# Patient Record
Sex: FEMALE | Race: BLACK OR AFRICAN AMERICAN | Employment: FULL TIME | URBAN - METROPOLITAN AREA
[De-identification: names, ages, dates, MRNs, and addresses within clinical notes are randomized per-mention and may not be internally consistent; named-entity substitution may affect disease eponyms.]

---

## 2017-01-05 ENCOUNTER — HOSPITAL ENCOUNTER (EMERGENCY)
Age: 32
Discharge: HOME | End: 2017-01-05
Attending: EMERGENCY MEDICINE
Payer: COMMERCIAL

## 2017-01-05 VITALS
HEART RATE: 81 BPM | TEMPERATURE: 98 F | RESPIRATION RATE: 18 BRPM | HEIGHT: 64 IN | SYSTOLIC BLOOD PRESSURE: 143 MMHG | BODY MASS INDEX: 48.14 KG/M2 | WEIGHT: 282 LBS | DIASTOLIC BLOOD PRESSURE: 96 MMHG | OXYGEN SATURATION: 100 %

## 2017-01-05 DIAGNOSIS — K52.9 GASTROENTERITIS: Primary | ICD-10-CM

## 2017-01-05 LAB
ALBUMIN SERPL-MCNC: 3.8 G/DL (ref 3.5–4.8)
ALP LIVER SERPL-CCNC: 51 U/L (ref 37–98)
ALT SERPL-CCNC: 21 U/L (ref 14–54)
AST SERPL-CCNC: 13 U/L (ref 15–41)
BASOPHILS # BLD AUTO: 0.03 X10(3) UL (ref 0–0.1)
BASOPHILS NFR BLD AUTO: 0.3 %
BILIRUB SERPL-MCNC: 0.3 MG/DL (ref 0.1–2)
BUN BLD-MCNC: 13 MG/DL (ref 8–20)
CALCIUM BLD-MCNC: 8.5 MG/DL (ref 8.3–10.3)
CHLORIDE: 111 MMOL/L (ref 101–111)
CO2: 25 MMOL/L (ref 22–32)
CREAT BLD-MCNC: 0.97 MG/DL (ref 0.55–1.02)
EOSINOPHIL # BLD AUTO: 0.08 X10(3) UL (ref 0–0.3)
EOSINOPHIL NFR BLD AUTO: 0.8 %
ERYTHROCYTE [DISTWIDTH] IN BLOOD BY AUTOMATED COUNT: 17.4 % (ref 11.5–16)
GLUCOSE BLD-MCNC: 100 MG/DL (ref 70–99)
HCT VFR BLD AUTO: 35.9 % (ref 34–50)
HGB BLD-MCNC: 10.9 G/DL (ref 12–16)
IMMATURE GRANULOCYTE COUNT: 0.01 X10(3) UL (ref 0–1)
IMMATURE GRANULOCYTE RATIO %: 0.1 %
LYMPHOCYTES # BLD AUTO: 2.87 X10(3) UL (ref 0.9–4)
LYMPHOCYTES NFR BLD AUTO: 27.6 %
M PROTEIN MFR SERPL ELPH: 8 G/DL (ref 6.1–8.3)
MCH RBC QN AUTO: 23.2 PG (ref 27–33.2)
MCHC RBC AUTO-ENTMCNC: 30.4 G/DL (ref 31–37)
MCV RBC AUTO: 76.4 FL (ref 81–100)
MONOCYTES # BLD AUTO: 0.63 X10(3) UL (ref 0.1–0.6)
MONOCYTES NFR BLD AUTO: 6.1 %
NEUTROPHIL ABS PRELIM: 6.76 X10 (3) UL (ref 1.3–6.7)
NEUTROPHILS # BLD AUTO: 6.76 X10(3) UL (ref 1.3–6.7)
NEUTROPHILS NFR BLD AUTO: 65.1 %
PLATELET # BLD AUTO: 317 10(3)UL (ref 150–450)
POTASSIUM SERPL-SCNC: 3.2 MMOL/L (ref 3.6–5.1)
RBC # BLD AUTO: 4.7 X10(6)UL (ref 3.8–5.1)
RED CELL DISTRIBUTION WIDTH-SD: 47.9 FL (ref 35.1–46.3)
SODIUM SERPL-SCNC: 143 MMOL/L (ref 136–144)
WBC # BLD AUTO: 10.4 X10(3) UL (ref 4–13)

## 2017-01-05 PROCEDURE — 80053 COMPREHEN METABOLIC PANEL: CPT | Performed by: EMERGENCY MEDICINE

## 2017-01-05 PROCEDURE — 85025 COMPLETE CBC W/AUTO DIFF WBC: CPT | Performed by: EMERGENCY MEDICINE

## 2017-01-05 PROCEDURE — 96361 HYDRATE IV INFUSION ADD-ON: CPT

## 2017-01-05 PROCEDURE — 99284 EMERGENCY DEPT VISIT MOD MDM: CPT

## 2017-01-05 PROCEDURE — 96374 THER/PROPH/DIAG INJ IV PUSH: CPT

## 2017-01-05 RX ORDER — ONDANSETRON 2 MG/ML
4 INJECTION INTRAMUSCULAR; INTRAVENOUS ONCE
Status: COMPLETED | OUTPATIENT
Start: 2017-01-05 | End: 2017-01-05

## 2017-01-05 RX ORDER — ONDANSETRON 2 MG/ML
4 INJECTION INTRAMUSCULAR; INTRAVENOUS ONCE
Status: DISCONTINUED | OUTPATIENT
Start: 2017-01-05 | End: 2017-01-05

## 2017-01-05 RX ORDER — ONDANSETRON 4 MG/1
4 TABLET, ORALLY DISINTEGRATING ORAL EVERY 4 HOURS PRN
Qty: 10 TABLET | Refills: 0 | Status: SHIPPED | OUTPATIENT
Start: 2017-01-05 | End: 2017-01-12

## 2017-01-05 NOTE — ED PROVIDER NOTES
Patient Seen in: THE [facility] Emergency Department In [place]    History   Patient presents with:  Nausea/Vomiting/Diarrhea (gastrointestinal)    Stated Complaint: vomiting/diarrhea    HPI    29-year-old female that comes in the hospital chief complaint of agreed except as otherwise stated in HPI.     Physical Exam       ED Triage Vitals   BP 01/05/17 1335 170/104 mmHg   Pulse 01/05/17 1335 89   Resp 01/05/17 1335 16   Temp 01/05/17 1335 98.1 °F (36.7 °C)   Temp src --    SpO2 01/05/17 1335 100 %   O2 Device REFLEX   RAINBOW DRAW LAVENDER   RAINBOW DRAW LIGHT GREEN     Medications   ondansetron HCl (ZOFRAN) injection 4 mg (not administered)   sodium chloride 0.9% IV bolus 1,000 mL (1,000 mL Intravenous New Bag 1/5/17 1340)   sodium chloride 0.9% IV bolus 1,000

## 2017-01-23 ENCOUNTER — HOSPITAL ENCOUNTER (EMERGENCY)
Facility: HOSPITAL | Age: 32
Discharge: HOME | End: 2017-01-24
Attending: EMERGENCY MEDICINE
Payer: COMMERCIAL

## 2017-01-23 DIAGNOSIS — R10.9 ABDOMINAL PAIN OF UNKNOWN ETIOLOGY: Primary | ICD-10-CM

## 2017-01-23 DIAGNOSIS — D72.829 LEUKOCYTOSIS, UNSPECIFIED TYPE: ICD-10-CM

## 2017-01-23 LAB
ALBUMIN SERPL-MCNC: 4 G/DL (ref 3.5–4.8)
ALP LIVER SERPL-CCNC: 56 U/L (ref 37–98)
ALT SERPL-CCNC: 23 U/L (ref 14–54)
AST SERPL-CCNC: 14 U/L (ref 15–41)
BASOPHILS # BLD AUTO: 0.05 X10(3) UL (ref 0–0.1)
BASOPHILS NFR BLD AUTO: 0.3 %
BILIRUB SERPL-MCNC: 0.6 MG/DL (ref 0.1–2)
BUN BLD-MCNC: 9 MG/DL (ref 8–20)
CALCIUM BLD-MCNC: 9 MG/DL (ref 8.3–10.3)
CHLORIDE: 104 MMOL/L (ref 101–111)
CO2: 26 MMOL/L (ref 22–32)
CREAT BLD-MCNC: 0.97 MG/DL (ref 0.55–1.02)
EOSINOPHIL # BLD AUTO: 0.04 X10(3) UL (ref 0–0.3)
EOSINOPHIL NFR BLD AUTO: 0.2 %
ERYTHROCYTE [DISTWIDTH] IN BLOOD BY AUTOMATED COUNT: 17 % (ref 11.5–16)
GLUCOSE BLD-MCNC: 84 MG/DL (ref 70–99)
HCT VFR BLD AUTO: 36.1 % (ref 34–50)
HGB BLD-MCNC: 11.1 G/DL (ref 12–16)
IMMATURE GRANULOCYTE COUNT: 0.06 X10(3) UL (ref 0–1)
IMMATURE GRANULOCYTE RATIO %: 0.4 %
LIPASE: 120 U/L (ref 73–393)
LYMPHOCYTES # BLD AUTO: 1.96 X10(3) UL (ref 0.9–4)
LYMPHOCYTES NFR BLD AUTO: 12.2 %
M PROTEIN MFR SERPL ELPH: 8.3 G/DL (ref 6.1–8.3)
MCH RBC QN AUTO: 23.2 PG (ref 27–33.2)
MCHC RBC AUTO-ENTMCNC: 30.7 G/DL (ref 31–37)
MCV RBC AUTO: 75.5 FL (ref 81–100)
MONOCYTES # BLD AUTO: 1.47 X10(3) UL (ref 0.1–0.6)
MONOCYTES NFR BLD AUTO: 9.1 %
NEUTROPHIL ABS PRELIM: 12.54 X10 (3) UL (ref 1.3–6.7)
NEUTROPHILS # BLD AUTO: 12.54 X10(3) UL (ref 1.3–6.7)
NEUTROPHILS NFR BLD AUTO: 77.8 %
PLATELET # BLD AUTO: 287 10(3)UL (ref 150–450)
POCT URINE PREGNANCY: NEGATIVE
POTASSIUM SERPL-SCNC: 3.2 MMOL/L (ref 3.6–5.1)
RBC # BLD AUTO: 4.78 X10(6)UL (ref 3.8–5.1)
RED CELL DISTRIBUTION WIDTH-SD: 45.2 FL (ref 35.1–46.3)
SODIUM SERPL-SCNC: 137 MMOL/L (ref 136–144)
WBC # BLD AUTO: 16.1 X10(3) UL (ref 4–13)

## 2017-01-23 PROCEDURE — 96361 HYDRATE IV INFUSION ADD-ON: CPT

## 2017-01-23 PROCEDURE — 99285 EMERGENCY DEPT VISIT HI MDM: CPT

## 2017-01-23 PROCEDURE — 96374 THER/PROPH/DIAG INJ IV PUSH: CPT

## 2017-01-23 PROCEDURE — 81001 URINALYSIS AUTO W/SCOPE: CPT | Performed by: EMERGENCY MEDICINE

## 2017-01-23 PROCEDURE — 96376 TX/PRO/DX INJ SAME DRUG ADON: CPT

## 2017-01-23 PROCEDURE — 80053 COMPREHEN METABOLIC PANEL: CPT | Performed by: EMERGENCY MEDICINE

## 2017-01-23 PROCEDURE — 85025 COMPLETE CBC W/AUTO DIFF WBC: CPT | Performed by: EMERGENCY MEDICINE

## 2017-01-23 PROCEDURE — 83690 ASSAY OF LIPASE: CPT | Performed by: EMERGENCY MEDICINE

## 2017-01-23 PROCEDURE — 81025 URINE PREGNANCY TEST: CPT

## 2017-01-23 PROCEDURE — 96375 TX/PRO/DX INJ NEW DRUG ADDON: CPT

## 2017-01-23 RX ORDER — LISINOPRIL AND HYDROCHLOROTHIAZIDE 25; 20 MG/1; MG/1
1 TABLET ORAL DAILY
COMMUNITY
End: 2017-12-12

## 2017-01-23 RX ORDER — HYDROMORPHONE HYDROCHLORIDE 1 MG/ML
1 INJECTION, SOLUTION INTRAMUSCULAR; INTRAVENOUS; SUBCUTANEOUS EVERY 30 MIN PRN
Status: DISCONTINUED | OUTPATIENT
Start: 2017-01-23 | End: 2017-01-24

## 2017-01-23 RX ORDER — ONDANSETRON 2 MG/ML
4 INJECTION INTRAMUSCULAR; INTRAVENOUS ONCE
Status: COMPLETED | OUTPATIENT
Start: 2017-01-23 | End: 2017-01-23

## 2017-01-23 RX ORDER — ONDANSETRON 2 MG/ML
4 INJECTION INTRAMUSCULAR; INTRAVENOUS ONCE
Status: COMPLETED | OUTPATIENT
Start: 2017-01-23 | End: 2017-01-24

## 2017-01-24 ENCOUNTER — APPOINTMENT (OUTPATIENT)
Dept: ULTRASOUND IMAGING | Facility: HOSPITAL | Age: 32
End: 2017-01-24
Attending: EMERGENCY MEDICINE
Payer: COMMERCIAL

## 2017-01-24 ENCOUNTER — APPOINTMENT (OUTPATIENT)
Dept: CT IMAGING | Facility: HOSPITAL | Age: 32
End: 2017-01-24
Attending: EMERGENCY MEDICINE
Payer: COMMERCIAL

## 2017-01-24 VITALS
SYSTOLIC BLOOD PRESSURE: 152 MMHG | DIASTOLIC BLOOD PRESSURE: 102 MMHG | WEIGHT: 275 LBS | TEMPERATURE: 99 F | HEIGHT: 64 IN | RESPIRATION RATE: 16 BRPM | OXYGEN SATURATION: 98 % | BODY MASS INDEX: 46.95 KG/M2 | HEART RATE: 92 BPM

## 2017-01-24 PROBLEM — R10.9 ABDOMINAL PAIN OF UNKNOWN ETIOLOGY: Status: ACTIVE | Noted: 2017-01-24

## 2017-01-24 LAB
BILIRUB UR QL STRIP.AUTO: NEGATIVE
CLARITY UR REFRACT.AUTO: CLEAR
COLOR UR AUTO: YELLOW
GLUCOSE UR STRIP.AUTO-MCNC: NEGATIVE MG/DL
KETONES UR STRIP.AUTO-MCNC: 20 MG/DL
LEUKOCYTE ESTERASE UR QL STRIP.AUTO: NEGATIVE
NITRITE UR QL STRIP.AUTO: NEGATIVE
PH UR STRIP.AUTO: 6 [PH] (ref 4.5–8)
PROT UR STRIP.AUTO-MCNC: 100 MG/DL
RBC UR QL AUTO: NEGATIVE
SP GR UR STRIP.AUTO: 1.02 (ref 1–1.03)
UROBILINOGEN UR STRIP.AUTO-MCNC: <2 MG/DL

## 2017-01-24 PROCEDURE — 76700 US EXAM ABDOM COMPLETE: CPT

## 2017-01-24 PROCEDURE — 71275 CT ANGIOGRAPHY CHEST: CPT

## 2017-01-24 PROCEDURE — 74177 CT ABD & PELVIS W/CONTRAST: CPT

## 2017-01-24 RX ORDER — ONDANSETRON 2 MG/ML
4 INJECTION INTRAMUSCULAR; INTRAVENOUS EVERY 4 HOURS PRN
Status: CANCELLED | OUTPATIENT
Start: 2017-01-24

## 2017-01-24 RX ORDER — HYDROMORPHONE HYDROCHLORIDE 1 MG/ML
0.5 INJECTION, SOLUTION INTRAMUSCULAR; INTRAVENOUS; SUBCUTANEOUS EVERY 30 MIN PRN
Status: CANCELLED | OUTPATIENT
Start: 2017-01-24 | End: 2017-01-24

## 2017-01-24 RX ORDER — POTASSIUM CHLORIDE 20 MEQ/1
40 TABLET, EXTENDED RELEASE ORAL ONCE
Status: COMPLETED | OUTPATIENT
Start: 2017-01-24 | End: 2017-01-24

## 2017-01-24 RX ORDER — SODIUM CHLORIDE 9 MG/ML
INJECTION, SOLUTION INTRAVENOUS CONTINUOUS
Status: CANCELLED | OUTPATIENT
Start: 2017-01-24 | End: 2017-01-24

## 2017-01-24 RX ORDER — ONDANSETRON 4 MG/1
4 TABLET, ORALLY DISINTEGRATING ORAL EVERY 4 HOURS PRN
Qty: 10 TABLET | Refills: 0 | Status: SHIPPED | OUTPATIENT
Start: 2017-01-24 | End: 2017-01-31

## 2017-01-24 NOTE — ED PROVIDER NOTES
Patient Seen in: [facility] Emergency Department    History   Patient presents with:  Nausea/Vomiting/Diarrhea (gastrointestinal)  Abdomen/Flank Pain (GI/)    Stated Complaint: vomiting, abd pain    HPI    The patient presents with complaints of r mmHg   Pulse 01/23/17 2301 102   Resp 01/23/17 2301 20   Temp 01/23/17 2301 99.9 °F (37.7 °C)   Temp src 01/23/17 2301 Temporal   SpO2 01/23/17 2301 98 %   O2 Device 01/23/17 2301 None (Room air)       Current:/111 mmHg  Pulse 95  Temp(Src) 99.9 °F ( 12.54 (*)     Monocyte Absolute 1.47 (*)     All other components within normal limits   LIPASE - Normal   CBC WITH DIFFERENTIAL WITH PLATELET    Narrative: The following orders were created for panel order CBC WITH DIFFERENTIAL WITH PLATELET.   Procedu Hospitalists. She may need a consult to surgery in the morning. At this present time there is no some rebound, guarding.     Disposition and Plan     Clinical Impression:  Abdominal pain of unknown etiology  (primary encounter diagnosis)  Leukocytosis, un

## 2017-02-24 ENCOUNTER — HOSPITAL ENCOUNTER (EMERGENCY)
Age: 32
Discharge: HOME | End: 2017-02-24
Attending: EMERGENCY MEDICINE
Payer: COMMERCIAL

## 2017-02-24 VITALS
RESPIRATION RATE: 16 BRPM | OXYGEN SATURATION: 98 % | TEMPERATURE: 99 F | WEIGHT: 275 LBS | DIASTOLIC BLOOD PRESSURE: 102 MMHG | HEIGHT: 64 IN | BODY MASS INDEX: 46.95 KG/M2 | HEART RATE: 86 BPM | SYSTOLIC BLOOD PRESSURE: 162 MMHG

## 2017-02-24 DIAGNOSIS — B37.3 CANDIDA VAGINITIS: Primary | ICD-10-CM

## 2017-02-24 LAB
BILIRUB UR QL STRIP.AUTO: NEGATIVE
COLOR UR AUTO: YELLOW
GLUCOSE UR STRIP.AUTO-MCNC: NEGATIVE MG/DL
KETONES UR STRIP.AUTO-MCNC: NEGATIVE MG/DL
NITRITE UR QL STRIP.AUTO: NEGATIVE
PH UR STRIP.AUTO: 7.5 [PH] (ref 4.5–8)
POCT LOT NUMBER: NORMAL
POCT URINE PREGNANCY: NEGATIVE
RBC UR QL AUTO: NEGATIVE
SP GR UR STRIP.AUTO: 1.02 (ref 1–1.03)
UROBILINOGEN UR STRIP.AUTO-MCNC: 1 MG/DL

## 2017-02-24 PROCEDURE — 87660 TRICHOMONAS VAGIN DIR PROBE: CPT | Performed by: EMERGENCY MEDICINE

## 2017-02-24 PROCEDURE — 81025 URINE PREGNANCY TEST: CPT

## 2017-02-24 PROCEDURE — 87491 CHLMYD TRACH DNA AMP PROBE: CPT | Performed by: EMERGENCY MEDICINE

## 2017-02-24 PROCEDURE — 99284 EMERGENCY DEPT VISIT MOD MDM: CPT

## 2017-02-24 PROCEDURE — 87510 GARDNER VAG DNA DIR PROBE: CPT | Performed by: EMERGENCY MEDICINE

## 2017-02-24 PROCEDURE — 87591 N.GONORRHOEAE DNA AMP PROB: CPT | Performed by: EMERGENCY MEDICINE

## 2017-02-24 PROCEDURE — 87480 CANDIDA DNA DIR PROBE: CPT | Performed by: EMERGENCY MEDICINE

## 2017-02-24 PROCEDURE — 87086 URINE CULTURE/COLONY COUNT: CPT | Performed by: EMERGENCY MEDICINE

## 2017-02-24 PROCEDURE — 81001 URINALYSIS AUTO W/SCOPE: CPT | Performed by: EMERGENCY MEDICINE

## 2017-02-24 NOTE — ED PROVIDER NOTES
Patient Seen in: THE [facility] Emergency Department In [place]    History   Patient presents with:  Eval-G (gynecologic)    Stated Complaint: vaginal itching/discharge    HPI    27-year-old female who presented to the emergency room complaining of vaginal di °F (37.1 °C)   Temp src 02/24/17 1232 Temporal   SpO2 02/24/17 1232 98 %   O2 Device 02/24/17 1232 None (Room air)       Current:/102 mmHg  Pulse 86  Temp(Src) 98.8 °F (37.1 °C) (Temporal)  Resp 16  Ht 162.6 cm (5' 4\")  Wt 124.739 kg  BMI 47.18 kg/m diagnosis)    Disposition:  Discharge    Follow-up:  Elissa Pastrana  [address] [facility]  [phone]    Schedule an appointment as soon as possible for a visit in 2 days        Medications Prescribed:  Current Discharge Medica

## 2017-02-25 NOTE — ED NOTES
PT NOTIFIED OF POSITIVE YEAST INFECTION AND TO COMPLETE COURSE OF MONISTAT AND FOLLOW UP WITH HER PRIMARY MD OR OBGYN. NO QUESTIONS ASKED.  VOICED UNDERSTANDING OF INSTRUCTIONS.   NO FURTHER TREATMENT REQUIRED

## 2017-02-27 LAB
C TRACH DNA SPEC QL NAA+PROBE: NEGATIVE
N GONORRHOEA DNA SPEC QL NAA+PROBE: NEGATIVE

## 2017-02-28 NOTE — ED NOTES
Pt called stating that she still has a yeast infection. Wanting another medication. I talked with Dr. Camilla Higginbotham and called in a prescription for Diflucan 150mg tablet for patient. Pt verbalized understanding.   Called Natalie in [place].   [phone]

## 2017-04-24 ENCOUNTER — APPOINTMENT (OUTPATIENT)
Dept: GENERAL RADIOLOGY | Age: 32
End: 2017-04-24
Payer: COMMERCIAL

## 2017-04-24 ENCOUNTER — HOSPITAL ENCOUNTER (EMERGENCY)
Age: 32
Discharge: HOME | End: 2017-04-24
Payer: COMMERCIAL

## 2017-04-24 VITALS
SYSTOLIC BLOOD PRESSURE: 165 MMHG | DIASTOLIC BLOOD PRESSURE: 102 MMHG | HEIGHT: 64 IN | OXYGEN SATURATION: 98 % | HEART RATE: 77 BPM | BODY MASS INDEX: 46.1 KG/M2 | RESPIRATION RATE: 16 BRPM | WEIGHT: 270 LBS | TEMPERATURE: 99 F

## 2017-04-24 DIAGNOSIS — S63.502A WRIST SPRAIN, LEFT, INITIAL ENCOUNTER: Primary | ICD-10-CM

## 2017-04-24 PROCEDURE — 99283 EMERGENCY DEPT VISIT LOW MDM: CPT

## 2017-04-24 PROCEDURE — 73110 X-RAY EXAM OF WRIST: CPT

## 2017-04-24 NOTE — ED PROVIDER NOTES
Patient Seen in: THE [facility] Emergency Department In [place]    History   Patient presents with:  Upper Extremity Injury (musculoskeletal)    Stated Complaint: LEFT WRIST PAIN \"POP\" AFTER PICKING UP LAUNDRY BASKET    HPI    Patient was picking up a laund Triage Vitals   BP 04/24/17 1323 139/92 mmHg   Pulse 04/24/17 1323 83   Resp 04/24/17 1323 18   Temp 04/24/17 1323 98.7 °F (37.1 °C)   Temp src 04/24/17 1323 Temporal   SpO2 04/24/17 1323 98 %   O2 Device 04/24/17 1323 None (Room air)       Current:/

## 2017-06-03 ENCOUNTER — APPOINTMENT (OUTPATIENT)
Dept: GENERAL RADIOLOGY | Age: 32
End: 2017-06-03
Attending: PHYSICIAN ASSISTANT
Payer: COMMERCIAL

## 2017-06-03 ENCOUNTER — HOSPITAL ENCOUNTER (EMERGENCY)
Age: 32
Discharge: HOME | End: 2017-06-03
Attending: EMERGENCY MEDICINE
Payer: COMMERCIAL

## 2017-06-03 VITALS
BODY MASS INDEX: 46.44 KG/M2 | HEIGHT: 64 IN | DIASTOLIC BLOOD PRESSURE: 89 MMHG | RESPIRATION RATE: 20 BRPM | HEART RATE: 71 BPM | SYSTOLIC BLOOD PRESSURE: 144 MMHG | TEMPERATURE: 99 F | WEIGHT: 272 LBS | OXYGEN SATURATION: 100 %

## 2017-06-03 DIAGNOSIS — R10.30 LOWER ABDOMINAL PAIN: Primary | ICD-10-CM

## 2017-06-03 PROCEDURE — 96361 HYDRATE IV INFUSION ADD-ON: CPT

## 2017-06-03 PROCEDURE — 74000 XR ABDOMEN (KUB) (1 AP VIEW)  (CPT=74000): CPT | Performed by: PHYSICIAN ASSISTANT

## 2017-06-03 PROCEDURE — 99284 EMERGENCY DEPT VISIT MOD MDM: CPT

## 2017-06-03 PROCEDURE — 81025 URINE PREGNANCY TEST: CPT

## 2017-06-03 PROCEDURE — 85025 COMPLETE CBC W/AUTO DIFF WBC: CPT | Performed by: PHYSICIAN ASSISTANT

## 2017-06-03 PROCEDURE — 81001 URINALYSIS AUTO W/SCOPE: CPT | Performed by: EMERGENCY MEDICINE

## 2017-06-03 PROCEDURE — 83690 ASSAY OF LIPASE: CPT | Performed by: PHYSICIAN ASSISTANT

## 2017-06-03 PROCEDURE — 96374 THER/PROPH/DIAG INJ IV PUSH: CPT

## 2017-06-03 PROCEDURE — 80053 COMPREHEN METABOLIC PANEL: CPT | Performed by: PHYSICIAN ASSISTANT

## 2017-06-03 RX ORDER — DICYCLOMINE HCL 20 MG
20 TABLET ORAL 4 TIMES DAILY PRN
Qty: 30 TABLET | Refills: 0 | Status: SHIPPED | OUTPATIENT
Start: 2017-06-03 | End: 2017-07-03

## 2017-06-03 RX ORDER — ONDANSETRON 2 MG/ML
4 INJECTION INTRAMUSCULAR; INTRAVENOUS ONCE
Status: COMPLETED | OUTPATIENT
Start: 2017-06-03 | End: 2017-06-03

## 2017-06-03 NOTE — ED PROVIDER NOTES
Patient Seen in: THE [facility] Emergency Department In [place]    History   Patient presents with:  Nausea/Vomiting/Diarrhea (gastrointestinal)    Stated Complaint:     HPI    Patient is a 65-year-old female.   For the past 3 days, patient has been experienci All other systems reviewed and negative except as noted above. PSFH elements reviewed from today and agreed except as otherwise stated in HPI.     Physical Exam       ED Triage Vitals   BP 06/03/17 1325 196/112 mmHg   Pulse 06/03/17 1324 83   Resp 06 Abnormal; Notable for the following:     RBC URINE >10 (*)     Bacteria Urine Rare (*)     Mucous Urine 1+ (*)     All other components within normal limits   COMP METABOLIC PANEL (14) - Abnormal; Notable for the following:     BUN 7 (*)     AST 10 (*) CT scans of the abdomen and pelvis. Patient's menstrual cycle started 3 days prior to arrival.  She also complains of urinary frequency, dysuria and urgency and loose stools. She is nontoxic-appearing at this time. No fever chills.   No back pain or flan

## 2017-06-03 NOTE — ED INITIAL ASSESSMENT (HPI)
Pt c/o right flank pain x \"a couple days ago, and wraps around to the front of me\". + nausea, vomiting. + diarrhea \"today\". Denies fever, dysuria, or hematuria. Motrin last at 0900.

## 2017-06-03 NOTE — ED PROVIDER NOTES
29-year-old female presents to the emergency department with complaints of intermittent lower abdominal cramping. Has been ongoing for the last few days.   Initially she was sitting was more in the right lower quadrant but on further discussion was going a

## 2017-08-04 ENCOUNTER — HOSPITAL ENCOUNTER (EMERGENCY)
Age: 32
Discharge: HOME | End: 2017-08-04
Attending: EMERGENCY MEDICINE
Payer: COMMERCIAL

## 2017-08-04 VITALS
HEART RATE: 77 BPM | DIASTOLIC BLOOD PRESSURE: 94 MMHG | BODY MASS INDEX: 46.95 KG/M2 | WEIGHT: 275 LBS | HEIGHT: 64 IN | OXYGEN SATURATION: 98 % | RESPIRATION RATE: 18 BRPM | TEMPERATURE: 99 F | SYSTOLIC BLOOD PRESSURE: 159 MMHG

## 2017-08-04 DIAGNOSIS — M54.9 BACK PAIN WITHOUT RADIATION: ICD-10-CM

## 2017-08-04 DIAGNOSIS — R11.2 NAUSEA AND VOMITING IN ADULT: Primary | ICD-10-CM

## 2017-08-04 LAB
BILIRUB UR QL STRIP.AUTO: NEGATIVE
COLOR UR AUTO: YELLOW
GLUCOSE UR STRIP.AUTO-MCNC: NEGATIVE MG/DL
KETONES UR STRIP.AUTO-MCNC: NEGATIVE MG/DL
LEUKOCYTE ESTERASE UR QL STRIP.AUTO: NEGATIVE
NITRITE UR QL STRIP.AUTO: NEGATIVE
PH UR STRIP.AUTO: 7 [PH] (ref 4.5–8)
POCT LOT NUMBER: NORMAL
POCT URINE PREGNANCY: NEGATIVE
RBC #/AREA URNS AUTO: >10 /HPF
SP GR UR STRIP.AUTO: 1.02 (ref 1–1.03)
UROBILINOGEN UR STRIP.AUTO-MCNC: 0.2 MG/DL

## 2017-08-04 PROCEDURE — 99283 EMERGENCY DEPT VISIT LOW MDM: CPT

## 2017-08-04 PROCEDURE — 81025 URINE PREGNANCY TEST: CPT

## 2017-08-04 PROCEDURE — 81001 URINALYSIS AUTO W/SCOPE: CPT | Performed by: PHYSICIAN ASSISTANT

## 2017-08-04 RX ORDER — ONDANSETRON 4 MG/1
4 TABLET, ORALLY DISINTEGRATING ORAL EVERY 4 HOURS PRN
Qty: 10 TABLET | Refills: 0 | Status: SHIPPED | OUTPATIENT
Start: 2017-08-04 | End: 2017-08-11

## 2017-08-04 RX ORDER — ONDANSETRON 4 MG/1
4 TABLET, ORALLY DISINTEGRATING ORAL ONCE
Status: COMPLETED | OUTPATIENT
Start: 2017-08-04 | End: 2017-08-04

## 2017-08-04 RX ORDER — MAGNESIUM HYDROXIDE/ALUMINUM HYDROXICE/SIMETHICONE 120; 1200; 1200 MG/30ML; MG/30ML; MG/30ML
30 SUSPENSION ORAL ONCE
Status: COMPLETED | OUTPATIENT
Start: 2017-08-04 | End: 2017-08-04

## 2017-08-04 NOTE — ED PROVIDER NOTES
Patient Seen in: THE [facility] Emergency Department In [facility] [place]    History   Patient presents with:  Back Pain (musculoskeletal)  Nausea/Vomiting/Diarrhea (gastrointestinal)    Stated Complaint: back pain/vomiting    HPI    CHIEF COMPLAINT: Back pain and vomi Take 1 tablet (4 mg total) by mouth every 4 (four) hours as needed for Nausea. Sertraline HCl (ZOLOFT OR),  Take by mouth. Lisinopril-Hydrochlorothiazide 20-25 MG Oral Tab,  Take 1 tablet by mouth daily.    Liraglutide -Weight Management (SAXENDA SC), auscultation  Cardiovascular: regular rate and rhythm  Gastrointestinal:  abdomen is soft,  and non tender, no masses, bowel sounds normal.  No rebound, guarding or rigidity noted. No abdominal distention. No pulsatile masses.   No CVA tenderness bilateral [address] [address]  [phone]    In 3 days  for a recheck    Cruz Francis MD  [address] [place] [address]  [phone]      gynecology referral    Nusrat Carias MD  #1 [id number] [facility] [address]

## 2017-09-12 ENCOUNTER — HOSPITAL ENCOUNTER (EMERGENCY)
Age: 32
Discharge: HOME | End: 2017-09-12
Attending: EMERGENCY MEDICINE
Payer: COMMERCIAL

## 2017-09-12 VITALS
WEIGHT: 275 LBS | TEMPERATURE: 100 F | BODY MASS INDEX: 47 KG/M2 | RESPIRATION RATE: 18 BRPM | HEART RATE: 91 BPM | SYSTOLIC BLOOD PRESSURE: 157 MMHG | DIASTOLIC BLOOD PRESSURE: 96 MMHG | OXYGEN SATURATION: 98 %

## 2017-09-12 DIAGNOSIS — S05.02XA ABRASION OF LEFT CORNEA, INITIAL ENCOUNTER: Primary | ICD-10-CM

## 2017-09-12 PROCEDURE — 99283 EMERGENCY DEPT VISIT LOW MDM: CPT

## 2017-09-12 RX ORDER — TOBRAMYCIN 3 MG/ML
2 SOLUTION/ DROPS OPHTHALMIC
Qty: 1 BOTTLE | Refills: 0 | Status: SHIPPED | OUTPATIENT
Start: 2017-09-12 | End: 2017-09-17

## 2017-09-12 RX ORDER — TETRACAINE HYDROCHLORIDE 5 MG/ML
1 SOLUTION OPHTHALMIC ONCE
Status: COMPLETED | OUTPATIENT
Start: 2017-09-12 | End: 2017-09-12

## 2017-09-12 RX ORDER — HYDROCODONE BITARTRATE AND ACETAMINOPHEN 5; 325 MG/1; MG/1
1-2 TABLET ORAL EVERY 4 HOURS PRN
Qty: 20 TABLET | Refills: 0 | Status: SHIPPED | OUTPATIENT
Start: 2017-09-12 | End: 2017-09-19

## 2017-09-12 NOTE — ED PROVIDER NOTES
Patient Seen in: [address] Emergency Department In [place]    History   Patient presents with: Eye Visual Problem (opthalmic)    Stated Complaint:     HPI    This is a pleasant 73-year-old female coming with complaints of eye pain.   He started noticing oriented patient appears no distress HEENT exam is normal focused eye exam shows no proptosis no periorbital erythema there is no foreign bodies underneath the lids with which were inverted for the exam.  Patient is noted to have normal funduscopic exam co

## 2017-10-07 ENCOUNTER — HOSPITAL ENCOUNTER (EMERGENCY)
Age: 32
Discharge: HOME | End: 2017-10-07
Attending: EMERGENCY MEDICINE
Payer: COMMERCIAL

## 2017-10-07 ENCOUNTER — APPOINTMENT (OUTPATIENT)
Dept: ULTRASOUND IMAGING | Age: 32
End: 2017-10-07
Attending: EMERGENCY MEDICINE
Payer: COMMERCIAL

## 2017-10-07 VITALS
WEIGHT: 280 LBS | HEART RATE: 82 BPM | SYSTOLIC BLOOD PRESSURE: 165 MMHG | TEMPERATURE: 99 F | OXYGEN SATURATION: 100 % | RESPIRATION RATE: 20 BRPM | HEIGHT: 64 IN | DIASTOLIC BLOOD PRESSURE: 109 MMHG | BODY MASS INDEX: 47.8 KG/M2

## 2017-10-07 DIAGNOSIS — R10.9 ABDOMINAL PAIN AFFECTING PREGNANCY: Primary | ICD-10-CM

## 2017-10-07 DIAGNOSIS — O26.899 ABDOMINAL PAIN AFFECTING PREGNANCY: Primary | ICD-10-CM

## 2017-10-07 PROCEDURE — 99284 EMERGENCY DEPT VISIT MOD MDM: CPT

## 2017-10-07 PROCEDURE — 86901 BLOOD TYPING SEROLOGIC RH(D): CPT | Performed by: EMERGENCY MEDICINE

## 2017-10-07 PROCEDURE — 85025 COMPLETE CBC W/AUTO DIFF WBC: CPT | Performed by: EMERGENCY MEDICINE

## 2017-10-07 PROCEDURE — 36415 COLL VENOUS BLD VENIPUNCTURE: CPT

## 2017-10-07 PROCEDURE — 76801 OB US < 14 WKS SINGLE FETUS: CPT | Performed by: EMERGENCY MEDICINE

## 2017-10-07 PROCEDURE — 84702 CHORIONIC GONADOTROPIN TEST: CPT | Performed by: EMERGENCY MEDICINE

## 2017-10-07 PROCEDURE — 76817 TRANSVAGINAL US OBSTETRIC: CPT | Performed by: EMERGENCY MEDICINE

## 2017-10-07 PROCEDURE — 81025 URINE PREGNANCY TEST: CPT

## 2017-10-07 PROCEDURE — 99285 EMERGENCY DEPT VISIT HI MDM: CPT

## 2017-10-07 PROCEDURE — 81001 URINALYSIS AUTO W/SCOPE: CPT | Performed by: EMERGENCY MEDICINE

## 2017-10-07 PROCEDURE — 86900 BLOOD TYPING SEROLOGIC ABO: CPT | Performed by: EMERGENCY MEDICINE

## 2017-10-08 NOTE — ED PROVIDER NOTES
Patient Seen in: THE [facility] Emergency Department In [place]    History   Patient presents with:  Urinary Symptoms (urologic)    Stated Complaint: urinary frequency x 3 days     HPI    57-year-old -American female who presents emergency room today f otherwise stated in HPI.     Physical Exam   ED Triage Vitals [10/07/17 1707]  BP: (!) 161/101  Pulse: 89  Resp: 20  Temp: 98.8 °F (37.1 °C)  Temp src: Oral  SpO2: 100 %  O2 Device: None (Room air)    Current:BP (!) 165/109   Pulse 82   Temp 98.8 °F (37.1 ° 49.2 (*)     Lymphocyte Absolute 4.83 (*)     Monocyte Absolute 1.01 (*)     All other components within normal limits   CBC WITH DIFFERENTIAL WITH PLATELET    Narrative:      The following orders were created for panel order CBC WITH DIFFERENTIAL WITH PLAT diagnosis)    Disposition:  Discharge    Follow-up:  Juan F Olmedo  [address] [facility] [address]  [address], [address] [facility], [address] [place] [address] [place] [address]  [phone]    In 3 days        Medication

## 2017-10-25 ENCOUNTER — HOSPITAL ENCOUNTER (EMERGENCY)
Age: 32
Discharge: HOME | End: 2017-10-25
Attending: EMERGENCY MEDICINE
Payer: COMMERCIAL

## 2017-10-25 VITALS
OXYGEN SATURATION: 100 % | RESPIRATION RATE: 20 BRPM | BODY MASS INDEX: 46.1 KG/M2 | HEIGHT: 64 IN | SYSTOLIC BLOOD PRESSURE: 161 MMHG | HEART RATE: 80 BPM | WEIGHT: 270 LBS | DIASTOLIC BLOOD PRESSURE: 101 MMHG | TEMPERATURE: 98 F

## 2017-10-25 DIAGNOSIS — I10 ELEVATED BLOOD PRESSURE READING IN OFFICE WITH DIAGNOSIS OF HYPERTENSION: ICD-10-CM

## 2017-10-25 DIAGNOSIS — H57.12 EYE DISCOMFORT, LEFT: Primary | ICD-10-CM

## 2017-10-25 PROCEDURE — 99283 EMERGENCY DEPT VISIT LOW MDM: CPT

## 2017-10-25 RX ORDER — SULFACETAMIDE SODIUM 100 MG/ML
1 SOLUTION/ DROPS OPHTHALMIC 4 TIMES DAILY
Qty: 1 BOTTLE | Refills: 0 | Status: SHIPPED | OUTPATIENT
Start: 2017-10-25 | End: 2017-11-11 | Stop reason: ALTCHOICE

## 2017-10-25 RX ORDER — TETRACAINE HYDROCHLORIDE 5 MG/ML
1 SOLUTION OPHTHALMIC ONCE
Status: COMPLETED | OUTPATIENT
Start: 2017-10-25 | End: 2017-10-25

## 2017-10-25 NOTE — ED PROVIDER NOTES
Patient Seen in: [facility] In [place]    History   Patient presents with:   Eye Visual Problem (opthalmic)    Stated Complaint: left eye pain     HPI  37-year-old female who comes in today stating that her left eye was itchy last nigh Pulse 80   Temp 98.3 °F (36.8 °C) (Temporal)   Resp 20   Ht 162.6 cm (5' 4\")   Wt 122.5 kg   LMP 08/24/2017   SpO2 100%   BMI 46.35 kg/m²     Right Eye Chart Acuity: 20/15, Corrected  Left Eye Chart Acuity: 20/15, Corrected    Physical Exam    BP (!) 161/ her visit today and remains so upon discharge.  I discuss the plan of care with the patient, who expresses understanding.  All questions and concerns are addressed to the patient's satisfaction prior to discharge today.         Disposition and Plan     Clin

## 2017-10-25 NOTE — ED INITIAL ASSESSMENT (HPI)
REPORTS WAKING UP EARLY AM WITH LEFT EYE IRRITATION.   STATES THRU THE DAY IT HAS GOTTEN MORE PAINFUL

## 2017-11-11 ENCOUNTER — HOSPITAL ENCOUNTER (EMERGENCY)
Age: 32
Discharge: HOME | End: 2017-11-11
Attending: EMERGENCY MEDICINE
Payer: COMMERCIAL

## 2017-11-11 ENCOUNTER — APPOINTMENT (OUTPATIENT)
Dept: ULTRASOUND IMAGING | Age: 32
End: 2017-11-11
Attending: EMERGENCY MEDICINE
Payer: COMMERCIAL

## 2017-11-11 VITALS
HEART RATE: 93 BPM | WEIGHT: 274 LBS | TEMPERATURE: 98 F | BODY MASS INDEX: 46.78 KG/M2 | OXYGEN SATURATION: 99 % | DIASTOLIC BLOOD PRESSURE: 77 MMHG | HEIGHT: 64 IN | RESPIRATION RATE: 16 BRPM | SYSTOLIC BLOOD PRESSURE: 150 MMHG

## 2017-11-11 DIAGNOSIS — N30.90 BLADDER INFECTION: ICD-10-CM

## 2017-11-11 DIAGNOSIS — I10 UNCONTROLLED HYPERTENSION: Primary | ICD-10-CM

## 2017-11-11 DIAGNOSIS — O20.0 THREATENED MISCARRIAGE: ICD-10-CM

## 2017-11-11 PROCEDURE — 87480 CANDIDA DNA DIR PROBE: CPT | Performed by: EMERGENCY MEDICINE

## 2017-11-11 PROCEDURE — 85025 COMPLETE CBC W/AUTO DIFF WBC: CPT | Performed by: EMERGENCY MEDICINE

## 2017-11-11 PROCEDURE — 87491 CHLMYD TRACH DNA AMP PROBE: CPT | Performed by: EMERGENCY MEDICINE

## 2017-11-11 PROCEDURE — 87077 CULTURE AEROBIC IDENTIFY: CPT | Performed by: EMERGENCY MEDICINE

## 2017-11-11 PROCEDURE — 87086 URINE CULTURE/COLONY COUNT: CPT | Performed by: EMERGENCY MEDICINE

## 2017-11-11 PROCEDURE — 87591 N.GONORRHOEAE DNA AMP PROB: CPT | Performed by: EMERGENCY MEDICINE

## 2017-11-11 PROCEDURE — 80053 COMPREHEN METABOLIC PANEL: CPT | Performed by: EMERGENCY MEDICINE

## 2017-11-11 PROCEDURE — 87510 GARDNER VAG DNA DIR PROBE: CPT | Performed by: EMERGENCY MEDICINE

## 2017-11-11 PROCEDURE — 84702 CHORIONIC GONADOTROPIN TEST: CPT | Performed by: EMERGENCY MEDICINE

## 2017-11-11 PROCEDURE — 76801 OB US < 14 WKS SINGLE FETUS: CPT | Performed by: EMERGENCY MEDICINE

## 2017-11-11 PROCEDURE — 36415 COLL VENOUS BLD VENIPUNCTURE: CPT

## 2017-11-11 PROCEDURE — 87660 TRICHOMONAS VAGIN DIR PROBE: CPT | Performed by: EMERGENCY MEDICINE

## 2017-11-11 PROCEDURE — 86901 BLOOD TYPING SEROLOGIC RH(D): CPT | Performed by: EMERGENCY MEDICINE

## 2017-11-11 PROCEDURE — 99284 EMERGENCY DEPT VISIT MOD MDM: CPT

## 2017-11-11 PROCEDURE — 86900 BLOOD TYPING SEROLOGIC ABO: CPT | Performed by: EMERGENCY MEDICINE

## 2017-11-11 PROCEDURE — 99285 EMERGENCY DEPT VISIT HI MDM: CPT

## 2017-11-11 PROCEDURE — 81001 URINALYSIS AUTO W/SCOPE: CPT | Performed by: EMERGENCY MEDICINE

## 2017-11-11 RX ORDER — CEPHALEXIN 250 MG/1
250 CAPSULE ORAL 4 TIMES DAILY
Qty: 28 CAPSULE | Refills: 0 | Status: SHIPPED | OUTPATIENT
Start: 2017-11-11 | End: 2017-11-18

## 2017-11-11 RX ORDER — POTASSIUM CHLORIDE 20 MEQ/1
40 TABLET, EXTENDED RELEASE ORAL ONCE
Status: COMPLETED | OUTPATIENT
Start: 2017-11-11 | End: 2017-11-11

## 2017-11-11 NOTE — ED PROVIDER NOTES
Patient Seen in: [address] Emergency Department In [place]    History   Patient presents with:  Eval-G (gynecologic)    Stated Complaint: eval g    HPI    Patient presents with spotting and cramping in pregnancy.   The patient is a  at 10 weeks pregn 97.6 °F (36.4 °C) (Temporal)   Resp 16   Ht 162.6 cm (5' 4\")   Wt 124.3 kg   LMP 08/24/2017   SpO2 99%   BMI 47.03 kg/m²         Physical Exam    General: Alert and oriented x3 in no acute distress.   HEENT: Normocephalic, atraumatic, pupils equal round an PLATELET    Narrative: The following orders were created for panel order CBC WITH DIFFERENTIAL WITH PLATELET.   Procedure                               Abnormality         Status                     ---------                               ----------- as of Nov 11 1911  ------------------------------------------------------------  The patient was given oral potassium supplementation for her mildly low potassium. She had no further bleeding while in the emergency department.   Her blood pressure has been

## 2017-12-12 PROBLEM — O10.019 BENIGN ESSENTIAL HYPERTENSION, ANTEPARTUM (HCC): Status: ACTIVE | Noted: 2017-12-12

## 2017-12-12 PROBLEM — Z98.891 S/P CESAREAN SECTION: Status: ACTIVE | Noted: 2017-12-12

## 2017-12-12 PROBLEM — O09.629 SUPERVISION OF HIGH-RISK PREGNANCY OF YOUNG MULTIGRAVIDA (HCC): Status: ACTIVE | Noted: 2017-12-12

## 2017-12-12 PROBLEM — O09.629 SUPERVISION OF HIGH-RISK PREGNANCY OF YOUNG MULTIGRAVIDA: Status: ACTIVE | Noted: 2017-12-12

## 2017-12-12 PROBLEM — F41.9 ANXIETY AND DEPRESSION: Status: ACTIVE | Noted: 2017-12-12

## 2017-12-12 PROBLEM — E66.01 MORBID OBESITY (HCC): Status: ACTIVE | Noted: 2017-12-12

## 2017-12-12 PROBLEM — F32.A ANXIETY AND DEPRESSION: Status: ACTIVE | Noted: 2017-12-12

## 2017-12-12 PROBLEM — O10.019 BENIGN ESSENTIAL HYPERTENSION, ANTEPARTUM: Status: ACTIVE | Noted: 2017-12-12

## 2017-12-12 PROCEDURE — 84156 ASSAY OF PROTEIN URINE: CPT | Performed by: OBSTETRICS & GYNECOLOGY

## 2017-12-12 PROCEDURE — 82570 ASSAY OF URINE CREATININE: CPT | Performed by: OBSTETRICS & GYNECOLOGY

## 2017-12-12 PROCEDURE — 36415 COLL VENOUS BLD VENIPUNCTURE: CPT | Performed by: OBSTETRICS & GYNECOLOGY

## 2018-01-16 ENCOUNTER — HOSPITAL (OUTPATIENT)
Dept: OTHER | Age: 33
End: 2018-01-16
Attending: OBSTETRICS & GYNECOLOGY

## 2018-01-16 LAB
ALT SERPL-CCNC: 12 UNIT/L
ANALYZER ANC (IANC): ABNORMAL
APPEARANCE UR: CLEAR
AST SERPL-CCNC: 8 UNIT/L
BILIRUB UR QL STRIP: NEGATIVE
BUN SERPL-MCNC: 7 MG/DL (ref 6–20)
BUN/CREAT SERPL: 10 (ref 7–25)
COLOR UR: YELLOW
CREAT SERPL-MCNC: 0.69 MG/DL (ref 0.51–0.95)
ERYTHROCYTE [DISTWIDTH] IN BLOOD: 18.5 % (ref 11–15)
FIBRINOGEN RESULT: 507 MG/DL (ref 190–425)
GLUCOSE UR STRIP-MCNC: NEGATIVE MG/DL
HEMATOCRIT: 31.6 % (ref 36–46.5)
HEMOCCULT STL QL: NEGATIVE
HGB BLD-MCNC: 9.9 GM/DL (ref 12–15.5)
KETONES UR STRIP-MCNC: ABNORMAL MG/DL
LEUKOCYTE ESTERASE UR QL STRIP: NEGATIVE
MCH RBC QN AUTO: 22.9 PG (ref 26–34)
MCHC RBC AUTO-ENTMCNC: 31.3 GM/DL (ref 32–36.5)
MCV RBC AUTO: 73 FL (ref 78–100)
NITRITE UR QL STRIP: NEGATIVE
ORGANIC ACIDS/CREAT UR-SRTO: 258.75 MG/DL
PH UR STRIP: 7 UNIT (ref 5–7)
PLATELET # BLD: 284 THOUSAND/MCL (ref 140–450)
PROT UR STRIP-MCNC: 30 MG/DL
PROT UR-MCNC: 37 MG/DL
PROT/CREAT UR: 143 MG/GM CREAT
RBC # BLD: 4.33 MILLION/MCL (ref 4–5.2)
SP GR UR STRIP: 1.02 (ref 1–1.03)
URATE SERPL-MCNC: 2.9 MG/DL (ref 2.6–5.9)
UROBILINOGEN UR STRIP-MCNC: 0.2 MG/DL (ref 0–1)
WBC # BLD: 16.7 THOUSAND/MCL (ref 4.2–11)

## 2018-01-22 ENCOUNTER — HOSPITAL (OUTPATIENT)
Dept: OTHER | Age: 33
End: 2018-01-22
Attending: OBSTETRICS & GYNECOLOGY

## 2018-01-22 LAB
ALBUMIN SERPL-MCNC: 2.8 GM/DL (ref 3.6–5.1)
ALBUMIN/GLOB SERPL: 0.6 {RATIO} (ref 1–2.4)
ALP SERPL-CCNC: 39 UNIT/L (ref 45–117)
ALT SERPL-CCNC: 14 UNIT/L
ANALYZER ANC (IANC): ABNORMAL
ANION GAP SERPL CALC-SCNC: 14 MMOL/L (ref 10–20)
AST SERPL-CCNC: 13 UNIT/L
BASOPHILS # BLD: 0 THOUSAND/MCL (ref 0–0.3)
BASOPHILS NFR BLD: 0 %
BILIRUB SERPL-MCNC: 0.2 MG/DL (ref 0.2–1)
BUN SERPL-MCNC: 5 MG/DL (ref 6–20)
BUN/CREAT SERPL: 7 (ref 7–25)
CALCIUM SERPL-MCNC: 8.7 MG/DL (ref 8.4–10.2)
CHLORIDE: 105 MMOL/L (ref 98–107)
CO2 SERPL-SCNC: 22 MMOL/L (ref 21–32)
CREAT SERPL-MCNC: 0.67 MG/DL (ref 0.51–0.95)
DIFFERENTIAL METHOD BLD: ABNORMAL
EOSINOPHIL # BLD: 0.1 THOUSAND/MCL (ref 0.1–0.5)
EOSINOPHIL NFR BLD: 1 %
ERYTHROCYTE [DISTWIDTH] IN BLOOD: 18.8 % (ref 11–15)
FIBRINOGEN RESULT: 529 MG/DL (ref 190–425)
GLOBULIN SER-MCNC: 4.4 GM/DL (ref 2–4)
GLUCOSE SERPL-MCNC: 83 MG/DL (ref 65–99)
HEMATOCRIT: 31.5 % (ref 36–46.5)
HGB BLD-MCNC: 9.7 GM/DL (ref 12–15.5)
INR PPP: 1
LYMPHOCYTES # BLD: 3.5 THOUSAND/MCL (ref 1–4.8)
LYMPHOCYTES NFR BLD: 24 %
MCH RBC QN AUTO: 22.8 PG (ref 26–34)
MCHC RBC AUTO-ENTMCNC: 30.8 GM/DL (ref 32–36.5)
MCV RBC AUTO: 73.9 FL (ref 78–100)
MONOCYTES # BLD: 1.3 THOUSAND/MCL (ref 0.3–0.9)
MONOCYTES NFR BLD: 9 %
NEUTROPHILS # BLD: 9.8 THOUSAND/MCL (ref 1.8–7.7)
NEUTROPHILS NFR BLD: 66 %
NEUTS SEG NFR BLD: ABNORMAL %
ORGANIC ACIDS/CREAT UR-SRTO: 389.98 MG/DL
PERCENT NRBC: ABNORMAL
PLATELET # BLD: 303 THOUSAND/MCL (ref 140–450)
POTASSIUM SERPL-SCNC: 3.4 MMOL/L (ref 3.4–5.1)
PROT SERPL-MCNC: 7.2 GM/DL (ref 6.4–8.2)
PROT UR-MCNC: 38 MG/DL
PROT/CREAT UR: 97 MG/GM CREAT
PROTHROMBIN TIME: 10.4 SECONDS (ref 9.7–11.8)
PROTHROMBIN TIME: NORMAL
RBC # BLD: 4.26 MILLION/MCL (ref 4–5.2)
SODIUM SERPL-SCNC: 138 MMOL/L (ref 135–145)
URATE SERPL-MCNC: 3.2 MG/DL (ref 2.6–5.9)
WBC # BLD: 14.7 THOUSAND/MCL (ref 4.2–11)

## 2018-01-22 PROCEDURE — 86780 TREPONEMA PALLIDUM: CPT | Performed by: OBSTETRICS & GYNECOLOGY

## 2018-01-22 PROCEDURE — 85025 COMPLETE CBC W/AUTO DIFF WBC: CPT | Performed by: OBSTETRICS & GYNECOLOGY

## 2018-01-22 PROCEDURE — 87591 N.GONORRHOEAE DNA AMP PROB: CPT | Performed by: OBSTETRICS & GYNECOLOGY

## 2018-01-22 PROCEDURE — 87491 CHLMYD TRACH DNA AMP PROBE: CPT | Performed by: OBSTETRICS & GYNECOLOGY

## 2018-01-22 PROCEDURE — 86900 BLOOD TYPING SEROLOGIC ABO: CPT | Performed by: OBSTETRICS & GYNECOLOGY

## 2018-01-22 PROCEDURE — 86901 BLOOD TYPING SEROLOGIC RH(D): CPT | Performed by: OBSTETRICS & GYNECOLOGY

## 2018-01-22 PROCEDURE — 86850 RBC ANTIBODY SCREEN: CPT | Performed by: OBSTETRICS & GYNECOLOGY

## 2018-01-22 PROCEDURE — 36415 COLL VENOUS BLD VENIPUNCTURE: CPT | Performed by: OBSTETRICS & GYNECOLOGY

## 2018-01-22 PROCEDURE — 87340 HEPATITIS B SURFACE AG IA: CPT | Performed by: OBSTETRICS & GYNECOLOGY

## 2018-01-22 PROCEDURE — 87389 HIV-1 AG W/HIV-1&-2 AB AG IA: CPT | Performed by: OBSTETRICS & GYNECOLOGY

## 2018-01-22 PROCEDURE — 86762 RUBELLA ANTIBODY: CPT | Performed by: OBSTETRICS & GYNECOLOGY

## 2018-01-23 ENCOUNTER — DIAGNOSTIC TRANS (OUTPATIENT)
Dept: OTHER | Age: 33
End: 2018-01-23

## 2018-01-23 PROBLEM — O99.019 ANTEPARTUM ANEMIA: Status: ACTIVE | Noted: 2018-01-23

## 2018-01-23 PROBLEM — O99.019 ANTEPARTUM ANEMIA (HCC): Status: ACTIVE | Noted: 2018-01-23

## 2018-02-13 PROBLEM — O09.629 SUPERVISION OF HIGH-RISK PREGNANCY OF YOUNG MULTIGRAVIDA (HCC): Status: RESOLVED | Noted: 2017-12-12 | Resolved: 2018-02-13

## 2018-02-13 PROBLEM — O09.629 SUPERVISION OF HIGH-RISK PREGNANCY OF YOUNG MULTIGRAVIDA: Status: RESOLVED | Noted: 2017-12-12 | Resolved: 2018-02-13

## 2018-03-13 PROCEDURE — 87389 HIV-1 AG W/HIV-1&-2 AB AG IA: CPT | Performed by: OBSTETRICS & GYNECOLOGY

## 2018-03-13 PROCEDURE — 82950 GLUCOSE TEST: CPT | Performed by: OBSTETRICS & GYNECOLOGY

## 2018-04-03 PROCEDURE — 82952 GTT-ADDED SAMPLES: CPT | Performed by: OBSTETRICS & GYNECOLOGY

## 2018-04-03 PROCEDURE — 82951 GLUCOSE TOLERANCE TEST (GTT): CPT | Performed by: OBSTETRICS & GYNECOLOGY

## 2018-04-03 PROCEDURE — 36415 COLL VENOUS BLD VENIPUNCTURE: CPT | Performed by: OBSTETRICS & GYNECOLOGY

## 2018-04-17 PROBLEM — O26.859 SPOTTING AFFECTING PREGNANCY (HCC): Status: ACTIVE | Noted: 2018-04-17

## 2018-04-17 PROBLEM — O26.859 SPOTTING AFFECTING PREGNANCY: Status: ACTIVE | Noted: 2018-04-17

## 2018-05-01 ENCOUNTER — HOSPITAL (OUTPATIENT)
Dept: OTHER | Age: 33
End: 2018-05-01
Attending: OBSTETRICS & GYNECOLOGY

## 2018-05-01 LAB
ALBUMIN SERPL-MCNC: 2.6 GM/DL (ref 3.6–5.1)
ALBUMIN/GLOB SERPL: 0.6 {RATIO} (ref 1–2.4)
ALP SERPL-CCNC: 79 UNIT/L (ref 45–117)
ALT SERPL-CCNC: 12 UNIT/L
ANALYZER ANC (IANC): ABNORMAL
ANION GAP SERPL CALC-SCNC: 15 MMOL/L (ref 10–20)
APTT PPP: 25 SECONDS (ref 22–30)
APTT PPP: NORMAL S
AST SERPL-CCNC: 9 UNIT/L
BILIRUB SERPL-MCNC: 0.3 MG/DL (ref 0.2–1)
BUN SERPL-MCNC: 8 MG/DL (ref 6–20)
BUN/CREAT SERPL: 10 (ref 7–25)
CALCIUM SERPL-MCNC: 9 MG/DL (ref 8.4–10.2)
CHLORIDE: 105 MMOL/L (ref 98–107)
CO2 SERPL-SCNC: 22 MMOL/L (ref 21–32)
CREAT SERPL-MCNC: 0.79 MG/DL (ref 0.51–0.95)
ERYTHROCYTE [DISTWIDTH] IN BLOOD: 16.9 % (ref 11–15)
FIBRINOGEN PPP-MCNC: 573 MG/DL (ref 190–425)
GLOBULIN SER-MCNC: 4.4 GM/DL (ref 2–4)
GLUCOSE SERPL-MCNC: 68 MG/DL (ref 65–99)
HEMATOCRIT: 31 % (ref 36–46.5)
HGB BLD-MCNC: 9.6 GM/DL (ref 12–15.5)
INR PPP: 1
MCH RBC QN AUTO: 22.3 PG (ref 26–34)
MCHC RBC AUTO-ENTMCNC: 31 GM/DL (ref 32–36.5)
MCV RBC AUTO: 71.9 FL (ref 78–100)
ORGANIC ACIDS/CREAT UR-SRTO: 462.64 MG/DL
PERCENT NRBC: ABNORMAL
PLATELET # BLD: 236 THOUSAND/MCL (ref 140–450)
POTASSIUM SERPL-SCNC: 3.4 MMOL/L (ref 3.4–5.1)
PROT SERPL-MCNC: 7 GM/DL (ref 6.4–8.2)
PROT UR-MCNC: 81 MG/DL
PROT/CREAT UR: 175 MG/GM CREAT
PROTHROMBIN TIME: 10 SECONDS (ref 9.7–11.8)
PROTHROMBIN TIME: NORMAL
RBC # BLD: 4.31 MILLION/MCL (ref 4–5.2)
SERVICE CMNT-IMP: ABNORMAL
SODIUM SERPL-SCNC: 139 MMOL/L (ref 135–145)
URATE SERPL-MCNC: 3.6 MG/DL (ref 2.6–5.9)
WBC # BLD: 16.3 THOUSAND/MCL (ref 4.2–11)

## 2018-05-08 PROCEDURE — 82570 ASSAY OF URINE CREATININE: CPT | Performed by: OBSTETRICS & GYNECOLOGY

## 2018-05-08 PROCEDURE — 84156 ASSAY OF PROTEIN URINE: CPT | Performed by: OBSTETRICS & GYNECOLOGY

## 2018-05-08 PROCEDURE — 87081 CULTURE SCREEN ONLY: CPT | Performed by: OBSTETRICS & GYNECOLOGY

## 2018-05-08 PROCEDURE — 87653 STREP B DNA AMP PROBE: CPT | Performed by: OBSTETRICS & GYNECOLOGY

## 2018-05-10 ENCOUNTER — HOSPITAL (OUTPATIENT)
Dept: OTHER | Age: 33
End: 2018-05-10
Attending: OBSTETRICS & GYNECOLOGY

## 2018-05-10 LAB
APPEARANCE UR: CLEAR
BILIRUB UR QL STRIP: NEGATIVE
COLOR UR: YELLOW
GLUCOSE UR STRIP-MCNC: 250 MG/DL
HEMOCCULT STL QL: ABNORMAL
KETONES UR STRIP-MCNC: ABNORMAL MG/DL
LEUKOCYTE ESTERASE UR QL STRIP: NEGATIVE
NITRITE UR QL STRIP: NEGATIVE
PH UR STRIP: 6 UNIT (ref 5–7)
PROT UR STRIP-MCNC: 30 MG/DL
SP GR UR STRIP: >1.03 (ref 1–1.03)
UROBILINOGEN UR STRIP-MCNC: 1 MG/DL (ref 0–1)

## 2018-05-11 LAB
ALBUMIN SERPL-MCNC: 2.6 GM/DL (ref 3.6–5.1)
ALBUMIN/GLOB SERPL: 0.6 {RATIO} (ref 1–2.4)
ALP SERPL-CCNC: 90 UNIT/L (ref 45–117)
ALT SERPL-CCNC: 18 UNIT/L
ANALYZER ANC (IANC): ABNORMAL
ANION GAP SERPL CALC-SCNC: 16 MMOL/L (ref 10–20)
APTT PPP: 25 SECONDS (ref 22–30)
APTT PPP: NORMAL S
AST SERPL-CCNC: 14 UNIT/L
BASE DEFICIT BLDCOA-SCNC: 11 MMOL/L
BASE DEFICIT BLDCOV-SCNC: 11 MMOL/L
BASE EXCESS BLDCOA CALC-SCNC: ABNORMAL MMOL/L
BASE EXCESS-RC: ABNORMAL
BASOPHILS # BLD: 0.2 THOUSAND/MCL (ref 0–0.3)
BASOPHILS NFR BLD: 1 %
BILIRUB SERPL-MCNC: 0.2 MG/DL (ref 0.2–1)
BUN SERPL-MCNC: 12 MG/DL (ref 6–20)
BUN/CREAT SERPL: 13 (ref 7–25)
CALCIUM SERPL-MCNC: 9.2 MG/DL (ref 8.4–10.2)
CHLORIDE: 106 MMOL/L (ref 98–107)
CO2 SERPL-SCNC: 21 MMOL/L (ref 21–32)
CREAT SERPL-MCNC: 0.9 MG/DL (ref 0.51–0.95)
DIFFERENTIAL METHOD BLD: ABNORMAL
EOSINOPHIL # BLD: 0.2 THOUSAND/MCL (ref 0.1–0.5)
EOSINOPHIL NFR BLD: 1 %
ERYTHROCYTE [DISTWIDTH] IN BLOOD: 17.5 % (ref 11–15)
FIBRINOGEN PPP-MCNC: 592 MG/DL (ref 190–425)
GLOBULIN SER-MCNC: 4.6 GM/DL (ref 2–4)
GLUCOSE SERPL-MCNC: 126 MG/DL (ref 65–99)
HCO3 BLDCOA-SCNC: 21 MMOL/L (ref 21–28)
HCO3 BLDCOV-SCNC: 20 MMOL/L (ref 22–29)
HEMATOCRIT: 29.6 % (ref 36–46.5)
HGB BLD-MCNC: 9.2 GM/DL (ref 12–15.5)
HGB BLD-MCNC: 9.3 GM/DL (ref 12–15.5)
INR PPP: 1
LYMPHOCYTES # BLD: 3.3 THOUSAND/MCL (ref 1–4.8)
LYMPHOCYTES NFR BLD: 21 %
MCH RBC QN AUTO: 22.2 PG (ref 26–34)
MCHC RBC AUTO-ENTMCNC: 31.1 GM/DL (ref 32–36.5)
MCV RBC AUTO: 71.3 FL (ref 78–100)
METAMYELOCYTES NFR BLD: 1 % (ref 0–2)
MICROCYTOSIS (MICY): ABNORMAL
MONOCYTES # BLD: 1.3 THOUSAND/MCL (ref 0.3–0.9)
MONOCYTES NFR BLD: 8 %
NEUTROPHILS # BLD: 10.7 THOUSAND/MCL (ref 1.8–7.7)
NEUTS BAND NFR BLD: 2 % (ref 0–10)
NEUTS SEG NFR BLD: 66 %
ORGANIC ACIDS/CREAT UR-SRTO: 254.41 MG/DL
PATH REV BLD -IMP: ABNORMAL
PCO2 BLDCOA: 80 MM HG (ref 31–74)
PCO2 BLDCOV: 63 MM HG (ref 23–49)
PERCENT NRBC: ABNORMAL
PH BLDCOA: 7.03 UNIT (ref 7.18–7.38)
PH BLDCOV: 7.1 UNIT (ref 7.25–7.45)
PLAT MORPH BLD: NORMAL
PLATELET # BLD: 280 THOUSAND/MCL (ref 140–450)
PO2 BLDCOV: <20 MM HG (ref 17–41)
PO2 RC ARTERIAL CORD (RACPO): <20 MM HG (ref 6–31)
POTASSIUM SERPL-SCNC: 3.4 MMOL/L (ref 3.4–5.1)
PROT SERPL-MCNC: 7.2 GM/DL (ref 6.4–8.2)
PROT UR-MCNC: 41 MG/DL
PROT/CREAT UR: 161 MG/GM CREAT
PROTHROMBIN TIME: 9.6 SECONDS (ref 9.7–11.8)
PROTHROMBIN TIME: ABNORMAL
RBC # BLD: 4.15 MILLION/MCL (ref 4–5.2)
SERVICE CMNT-IMP: ABNORMAL
SODIUM SERPL-SCNC: 140 MMOL/L (ref 135–145)
URATE SERPL-MCNC: 3.9 MG/DL (ref 2.6–5.9)
WBC # BLD: 15.7 THOUSAND/MCL (ref 4.2–11)
WBC MORPH BLD: NORMAL

## 2018-05-12 LAB
ANALYZER ANC (IANC): ABNORMAL
ANION GAP SERPL CALC-SCNC: 14 MMOL/L (ref 10–20)
BUN SERPL-MCNC: 9 MG/DL (ref 6–20)
BUN/CREAT SERPL: 12 (ref 7–25)
CALCIUM SERPL-MCNC: 7.9 MG/DL (ref 8.4–10.2)
CHLORIDE: 104 MMOL/L (ref 98–107)
CO2 SERPL-SCNC: 24 MMOL/L (ref 21–32)
CREAT SERPL-MCNC: 0.76 MG/DL (ref 0.51–0.95)
ERYTHROCYTE [DISTWIDTH] IN BLOOD: 17.8 % (ref 11–15)
GLUCOSE SERPL-MCNC: 126 MG/DL (ref 65–99)
HEMATOCRIT: 28.6 % (ref 36–46.5)
HGB BLD-MCNC: 8.7 GM/DL (ref 12–15.5)
MCH RBC QN AUTO: 21.8 PG (ref 26–34)
MCHC RBC AUTO-ENTMCNC: 30.4 GM/DL (ref 32–36.5)
MCV RBC AUTO: 71.7 FL (ref 78–100)
PERCENT NRBC: ABNORMAL
PLATELET # BLD: 267 THOUSAND/MCL (ref 140–450)
POTASSIUM SERPL-SCNC: 3.7 MMOL/L (ref 3.4–5.1)
RBC # BLD: 3.99 MILLION/MCL (ref 4–5.2)
SODIUM SERPL-SCNC: 138 MMOL/L (ref 135–145)
WBC # BLD: 23.6 THOUSAND/MCL (ref 4.2–11)

## 2018-05-21 ENCOUNTER — APPOINTMENT (OUTPATIENT)
Dept: CT IMAGING | Facility: HOSPITAL | Age: 33
DRG: 776 | End: 2018-05-21
Attending: EMERGENCY MEDICINE
Payer: MEDICAID

## 2018-05-21 ENCOUNTER — APPOINTMENT (OUTPATIENT)
Dept: GENERAL RADIOLOGY | Facility: HOSPITAL | Age: 33
DRG: 776 | End: 2018-05-21
Attending: EMERGENCY MEDICINE
Payer: MEDICAID

## 2018-05-21 ENCOUNTER — HOSPITAL ENCOUNTER (INPATIENT)
Facility: HOSPITAL | Age: 33
LOS: 3 days | Discharge: HOME | DRG: 776 | End: 2018-05-25
Attending: EMERGENCY MEDICINE | Admitting: OBSTETRICS & GYNECOLOGY
Payer: MEDICAID

## 2018-05-21 DIAGNOSIS — T81.49XA ABDOMINAL WALL ABSCESS AT SITE OF SURGICAL WOUND: Primary | ICD-10-CM

## 2018-05-21 PROBLEM — R73.9 HYPERGLYCEMIA: Status: ACTIVE | Noted: 2018-05-21

## 2018-05-21 PROBLEM — D64.9 ANEMIA: Status: ACTIVE | Noted: 2018-05-21

## 2018-05-21 PROBLEM — D72.829 LEUKOCYTOSIS: Status: ACTIVE | Noted: 2018-05-21

## 2018-05-21 PROCEDURE — 87086 URINE CULTURE/COLONY COUNT: CPT | Performed by: EMERGENCY MEDICINE

## 2018-05-21 PROCEDURE — 74177 CT ABD & PELVIS W/CONTRAST: CPT | Performed by: EMERGENCY MEDICINE

## 2018-05-21 PROCEDURE — 96366 THER/PROPH/DIAG IV INF ADDON: CPT

## 2018-05-21 PROCEDURE — 96361 HYDRATE IV INFUSION ADD-ON: CPT

## 2018-05-21 PROCEDURE — 81001 URINALYSIS AUTO W/SCOPE: CPT | Performed by: EMERGENCY MEDICINE

## 2018-05-21 PROCEDURE — 99285 EMERGENCY DEPT VISIT HI MDM: CPT

## 2018-05-21 PROCEDURE — 85025 COMPLETE CBC W/AUTO DIFF WBC: CPT | Performed by: EMERGENCY MEDICINE

## 2018-05-21 PROCEDURE — 83605 ASSAY OF LACTIC ACID: CPT | Performed by: EMERGENCY MEDICINE

## 2018-05-21 PROCEDURE — 36415 COLL VENOUS BLD VENIPUNCTURE: CPT

## 2018-05-21 PROCEDURE — 96365 THER/PROPH/DIAG IV INF INIT: CPT

## 2018-05-21 PROCEDURE — 80053 COMPREHEN METABOLIC PANEL: CPT | Performed by: EMERGENCY MEDICINE

## 2018-05-21 PROCEDURE — 71045 X-RAY EXAM CHEST 1 VIEW: CPT | Performed by: EMERGENCY MEDICINE

## 2018-05-21 PROCEDURE — 87040 BLOOD CULTURE FOR BACTERIA: CPT | Performed by: EMERGENCY MEDICINE

## 2018-05-21 RX ORDER — IBUPROFEN 600 MG/1
600 TABLET ORAL EVERY 6 HOURS PRN
COMMUNITY
End: 2018-10-15

## 2018-05-21 RX ORDER — ACETAMINOPHEN 500 MG
1000 TABLET ORAL ONCE
Status: COMPLETED | OUTPATIENT
Start: 2018-05-21 | End: 2018-05-21

## 2018-05-22 ENCOUNTER — APPOINTMENT (OUTPATIENT)
Dept: CT IMAGING | Facility: HOSPITAL | Age: 33
DRG: 776 | End: 2018-05-22
Attending: OBSTETRICS & GYNECOLOGY
Payer: MEDICAID

## 2018-05-22 PROCEDURE — 87077 CULTURE AEROBIC IDENTIFY: CPT | Performed by: OBSTETRICS & GYNECOLOGY

## 2018-05-22 PROCEDURE — 99152 MOD SED SAME PHYS/QHP 5/>YRS: CPT | Performed by: OBSTETRICS & GYNECOLOGY

## 2018-05-22 PROCEDURE — 87070 CULTURE OTHR SPECIMN AEROBIC: CPT | Performed by: OBSTETRICS & GYNECOLOGY

## 2018-05-22 PROCEDURE — 87205 SMEAR GRAM STAIN: CPT | Performed by: OBSTETRICS & GYNECOLOGY

## 2018-05-22 PROCEDURE — 49406 IMAGE CATH FLUID PERI/RETRO: CPT | Performed by: OBSTETRICS & GYNECOLOGY

## 2018-05-22 PROCEDURE — 87076 CULTURE ANAEROBE IDENT EACH: CPT | Performed by: OBSTETRICS & GYNECOLOGY

## 2018-05-22 PROCEDURE — 85025 COMPLETE CBC W/AUTO DIFF WBC: CPT | Performed by: OBSTETRICS & GYNECOLOGY

## 2018-05-22 PROCEDURE — 10030 IMG GID FLU COLL DRG SFT TIS: CPT | Performed by: OBSTETRICS & GYNECOLOGY

## 2018-05-22 PROCEDURE — 0W9F30Z DRAINAGE OF ABDOMINAL WALL WITH DRAINAGE DEVICE, PERCUTANEOUS APPROACH: ICD-10-PCS | Performed by: RADIOLOGY

## 2018-05-22 PROCEDURE — 85610 PROTHROMBIN TIME: CPT | Performed by: OBSTETRICS & GYNECOLOGY

## 2018-05-22 PROCEDURE — 87186 SC STD MICRODIL/AGAR DIL: CPT | Performed by: OBSTETRICS & GYNECOLOGY

## 2018-05-22 RX ORDER — SODIUM CHLORIDE 9 MG/ML
INJECTION, SOLUTION INTRAVENOUS CONTINUOUS
Status: DISCONTINUED | OUTPATIENT
Start: 2018-05-22 | End: 2018-05-22 | Stop reason: HOSPADM

## 2018-05-22 RX ORDER — ONDANSETRON 2 MG/ML
4 INJECTION INTRAMUSCULAR; INTRAVENOUS EVERY 4 HOURS PRN
Status: DISCONTINUED | OUTPATIENT
Start: 2018-05-22 | End: 2018-05-25

## 2018-05-22 RX ORDER — NIFEDIPINE 10 MG/1
10 CAPSULE ORAL
Status: COMPLETED | OUTPATIENT
Start: 2018-05-22 | End: 2018-05-23

## 2018-05-22 RX ORDER — ACETAMINOPHEN 325 MG/1
650 TABLET ORAL EVERY 6 HOURS PRN
Status: DISCONTINUED | OUTPATIENT
Start: 2018-05-22 | End: 2018-05-25

## 2018-05-22 RX ORDER — IBUPROFEN 600 MG/1
600 TABLET ORAL EVERY 6 HOURS PRN
Status: DISCONTINUED | OUTPATIENT
Start: 2018-05-22 | End: 2018-05-25

## 2018-05-22 RX ORDER — HYDROMORPHONE HYDROCHLORIDE 1 MG/ML
0.5 INJECTION, SOLUTION INTRAMUSCULAR; INTRAVENOUS; SUBCUTANEOUS EVERY 30 MIN PRN
Status: ACTIVE | OUTPATIENT
Start: 2018-05-22 | End: 2018-05-22

## 2018-05-22 RX ORDER — DEXTROSE, SODIUM CHLORIDE, SODIUM LACTATE, POTASSIUM CHLORIDE, AND CALCIUM CHLORIDE 5; .6; .31; .03; .02 G/100ML; G/100ML; G/100ML; G/100ML; G/100ML
INJECTION, SOLUTION INTRAVENOUS CONTINUOUS
Status: DISCONTINUED | OUTPATIENT
Start: 2018-05-22 | End: 2018-05-25

## 2018-05-22 RX ORDER — HYDROMORPHONE HYDROCHLORIDE 1 MG/ML
1 INJECTION, SOLUTION INTRAMUSCULAR; INTRAVENOUS; SUBCUTANEOUS EVERY 4 HOURS PRN
Status: DISCONTINUED | OUTPATIENT
Start: 2018-05-22 | End: 2018-05-25

## 2018-05-22 RX ORDER — MIDAZOLAM HYDROCHLORIDE 1 MG/ML
1 INJECTION INTRAMUSCULAR; INTRAVENOUS EVERY 5 MIN PRN
Status: ACTIVE | OUTPATIENT
Start: 2018-05-22 | End: 2018-05-22

## 2018-05-22 RX ORDER — MIDAZOLAM HYDROCHLORIDE 1 MG/ML
INJECTION INTRAMUSCULAR; INTRAVENOUS
Status: COMPLETED
Start: 2018-05-22 | End: 2018-05-22

## 2018-05-22 RX ORDER — FLUMAZENIL 0.1 MG/ML
0.2 INJECTION, SOLUTION INTRAVENOUS AS NEEDED
Status: DISCONTINUED | OUTPATIENT
Start: 2018-05-22 | End: 2018-05-22 | Stop reason: HOSPADM

## 2018-05-22 RX ORDER — NIFEDIPINE 30 MG/1
30 TABLET, EXTENDED RELEASE ORAL 2 TIMES DAILY
Status: DISCONTINUED | OUTPATIENT
Start: 2018-05-22 | End: 2018-05-25

## 2018-05-22 RX ORDER — SODIUM CHLORIDE 9 MG/ML
INJECTION, SOLUTION INTRAVENOUS CONTINUOUS
Status: ACTIVE | OUTPATIENT
Start: 2018-05-22 | End: 2018-05-22

## 2018-05-22 RX ORDER — NALOXONE HYDROCHLORIDE 0.4 MG/ML
80 INJECTION, SOLUTION INTRAMUSCULAR; INTRAVENOUS; SUBCUTANEOUS AS NEEDED
Status: DISCONTINUED | OUTPATIENT
Start: 2018-05-22 | End: 2018-05-22 | Stop reason: HOSPADM

## 2018-05-22 RX ORDER — HYDROCODONE BITARTRATE AND ACETAMINOPHEN 5; 325 MG/1; MG/1
1 TABLET ORAL EVERY 6 HOURS PRN
Status: DISCONTINUED | OUTPATIENT
Start: 2018-05-22 | End: 2018-05-25

## 2018-05-22 NOTE — PAYOR COMM NOTE
--------------  ADMISSION REVIEW     Payor: Yovani Sanders #:  [id number]  Authorization Number: [id number]    Admit date: 5/22/18  Admit time: 0034       Admitting Physician: Filipe Santos MD  Attending Physician reviewed and negative except as noted above.     Physical Exam   ED Triage Vitals [05/21/18 2007]  BP: (!) 149/101  Pulse: 115  Resp: 22  Temp: (!) 103.5 °F (39.7 °C)  Temp src: Oral  SpO2: 99 %  O2 Device: None (Room air)    Current:BP (!) 153/94 (BP Locat Abnormal; Notable for the following:     WBC 19.5 (*)     RBC 3.34 (*)     HGB 7.4 (*)     HCT 24.9 (*)     .0 (*)     MCV 74.6 (*)     MCH 22.2 (*)     MCHC 29.7 (*)     RDW 19.0 (*)     RDW-SD 49.8 (*)     Neutrophil Absolute Prelim 14.34 (*) radiology. Patient verbalizes understanding and is comfortable with the plan as recommended. Remainder of the patient's emergency room stay was without incident.     Disposition and Plan   Clinical Impression:  Abdominal wall abscess at site of surgical MAURA-Min-Fe Fum-FA-DHA (PRENATAL 1 OR) Take 1 tablet by mouth daily. Disp:  Rfl:  Taking   NIFEdipine ER Osmotic Release 30 MG Oral Tablet 24 Hr Take 1 tablet (30 mg total) by mouth 2 (two) times daily.  Disp: 60 tablet Rfl: 1    Labetalol HCl 200 MG Oral Tab - - 102 - - - -   05/21/18 2229 (!) 141/91 (!) 102.5 °F (39.2 °C) Oral 100 24 - - -   05/21/18 2108 (!) 147/92 (!) 103.1 °F (39.5 °C) Temporal 109 24 - - -   05/21/18 2107 (!) 147/92 - - 109 24 - - -   05/21/18 2007 (!) 149/101 (!) 103.5 °F (39.7 °C) Oral risks/benefits/alternatives discussed  3) SCDs; no anticoagulation due to procedure  4) CHTN--procardia  5) anemia--asymptomatic; recheck CBC at noon; blood transfusion if indicated    All of the findings and plan were discussed with the patient.   She note User    5/22/2018 0325 Given 1 tablet Oral Rosiland Rosina, MALACHI      ibuprofen (MOTRIN) tab 600 mg     Date Action Dose Route User    5/22/2018 0529 Given 600 mg Oral Rosiland Rosina, MALACHI      Midazolam HCl (VERSED) 2 MG/2ML injection 1 mg     Date Action

## 2018-05-22 NOTE — ED PROVIDER NOTES
Patient Seen in: [facility] Emergency Department    History   Patient presents with:  Postop/Procedure Problem    Stated Complaint: 1 wk post c section pain/fever/dysuria    HPI    Patient is a pleasant 21-year-old female, 10 days status post C-sect Location: Left arm)   Pulse 100   Temp 98.3 °F (36.8 °C) (Oral)   Resp 20   Ht 162.6 cm (5' 4\")   Wt (!) 137.6 kg   SpO2 95%   Breastfeeding? Yes   BMI 52.06 kg/m²     Physical Exam    Vital signs noted.    GENERAL: Patient is awake and alert, supine on th Neutrophil Absolute 14.34 (*)     Monocyte Absolute 1.81 (*)     All other components within normal limits   LACTIC ACID, PLASMA - Normal   CBC WITH DIFFERENTIAL WITH PLATELET    Narrative:      The following orders were created for panel order CBC WITH non-ionic intravenous contrast material. Post contrast coronal MPR imaging was performed. Dose reduction techniques were used.  Dose information is transmitted to the [facility] [employer]) Chriss. Flip Bryan [address] ([address]) which include involve the anterior abdominal wall at the level of the lower abdomen and pelvis, noted anterior to the uterus and along the surgical incisional site most concerning for a large abscess involving the region of the rectus sheath.   This measures approximatel T81. 4XXA 5/21/2018 Unknown    Anemia D64.9 5/21/2018 Yes    Hyperglycemia R73.9 5/21/2018 Yes    Leukocytosis D72.829 5/21/2018 Yes

## 2018-05-22 NOTE — ED INITIAL ASSESSMENT (HPI)
s/p c/s on 5/11. c/o chills and increased incision pain over last 2 days. Denies breast pain or warm areas. Is unable to see her incision and is unsure if there has been drainage. c/o dysuria and foul lochia odor.

## 2018-05-22 NOTE — RESPIRATORY THERAPY NOTE
When I went to patients room, the patient was sleeping on room air sating 94%. Will pass on in report about CPAP eval order. Will continue to monitor.

## 2018-05-22 NOTE — IMAGING NOTE
Notified  Dallin Glaser RN notified to get a stat PT/INR tentative time for 1130. Pt is NPO.  No blood thinner given

## 2018-05-22 NOTE — PLAN OF CARE
HEMATOLOGIC - ADULT    • Maintains hematologic stability Progressing        PAIN - ADULT    • Verbalizes/displays adequate comfort level or patient's stated pain goal Progressing        SKIN/TISSUE INTEGRITY - ADULT    • Incision(s), wounds(s) or drain sit

## 2018-05-22 NOTE — IMAGING NOTE
Pt down from nursing unit for abdominal drain placement. Tolerated procedure well and was returned to nursing unit. All drugs provided per nurse after RBLYNSEY w/Dr Denny Torres. Post procedure vitals stable.  14F all purpose drain remains sutured into RLQ and attached t

## 2018-05-22 NOTE — ED NOTES
Pt states is pumping breast milk, Brenda LY notified. Pt reminded to call for assistance when getting out of bed. Pt and belongings transported to inpatient room.

## 2018-05-22 NOTE — ED NOTES
Bedside report received from [facility]. Pt ambulatory to bathroom with steady gait. Aware of plan for admission. Awaiting MD to discuss results.

## 2018-05-22 NOTE — PROGRESS NOTES
Patient admitted via cart from the ED in stable condition. Afebrile at this time, VSS. BP slightly elevated. Admission navigator completed. Patient assessed and POC discussed. Oriented to room. Safety precautions initiated. Bed in low position.    Sreedhar

## 2018-05-22 NOTE — H&P
[facility]  OB/GYN H&P    Lauri Tay Patient Status:  Inpatient    9/10/1985 MRN [id number]   [facility] 3NW-A Attending Randee Silva MD   Hosp Day # 0 PCP Aida Magallon MD     SUBJECTIVE:  Reason for Admission: fever    Hi Births           3      # Outcome Date GA Lbr Flako/2nd Weight Sex Delivery Anes PTL Lv   3  18 36w3d   F CS-LTranv Spinal  JACKIE   2 Term 16 38w5d  8 lb 4 oz (3.742 kg) M Caesarean   JACKIE      Complications: Preeclampsia   1 Term 12 37 General---NAD  CV--RRR  Lungs--CTA B  Abdomen--+tender on pannus above incision, incision clean/dry/intact  Ext--calves NT bilaterally  Pelvic: deferred      Lab Results  Component Value Date   WBC 19.5 05/21/2018   HGB 7.4 05/21/2018   HCT 24.9 05/21/ ductal dilatation, or atrophy. SPLEEN:  No enlargement or focal lesion. KIDNEYS:  No mass, obstruction, or calcification. ADRENALS:  No mass or enlargement. AORTA/VASCULAR:  No aneurysm or dissection. RETROPERITONEUM:  No mass or adenopathy. risks/benefits/alternatives discussed  3) SCDs; no anticoagulation due to procedure  4) CHTN--procardia  5) anemia--asymptomatic; recheck CBC at noon; blood transfusion if indicated    All of the findings and plan were discussed with the patient.   She note

## 2018-05-22 NOTE — PROCEDURES
Ant abd fluid collection. 14F pigtail to gravity bag. Mal oderous thick bloody material, sample to lab. Prob infected hematoma. Comp-none.

## 2018-05-23 PROCEDURE — 05H533Z INSERTION OF INFUSION DEVICE INTO RIGHT SUBCLAVIAN VEIN, PERCUTANEOUS APPROACH: ICD-10-PCS | Performed by: OBSTETRICS & GYNECOLOGY

## 2018-05-23 PROCEDURE — 76937 US GUIDE VASCULAR ACCESS: CPT

## 2018-05-23 PROCEDURE — 36569 INSJ PICC 5 YR+ W/O IMAGING: CPT

## 2018-05-23 RX ORDER — MELATONIN
325
Status: DISCONTINUED | OUTPATIENT
Start: 2018-05-23 | End: 2018-05-25

## 2018-05-23 RX ORDER — SODIUM CHLORIDE 0.9 % (FLUSH) 0.9 %
10 SYRINGE (ML) INJECTION EVERY 12 HOURS
Status: DISCONTINUED | OUTPATIENT
Start: 2018-05-23 | End: 2018-05-25

## 2018-05-23 RX ORDER — SENNOSIDES 8.6 MG
8.6 TABLET ORAL 2 TIMES DAILY
Status: DISCONTINUED | OUTPATIENT
Start: 2018-05-23 | End: 2018-05-25

## 2018-05-23 RX ORDER — DOCUSATE SODIUM 100 MG/1
100 CAPSULE, LIQUID FILLED ORAL 2 TIMES DAILY
Status: DISCONTINUED | OUTPATIENT
Start: 2018-05-23 | End: 2018-05-25

## 2018-05-23 NOTE — PROGRESS NOTES
S  Pt feels better than yesterday.   Drain site is painful  O afebrile  -180/73-97  p 90    Abd soft nt  Draining dark red material from drain  Lochia minimal    blood cultures NG  Abscess fluid Gram + cocci  WBC 19.4      A abdominal wall abscess 12

## 2018-05-23 NOTE — RESPIRATORY THERAPY NOTE
APRIL - Equipment Use Daily Summary:                  . Set Mode:                . Usage in hours:                . 90% Pressure (EPAP) level:                . 90% Insp. Pressure (IPAP): Yuvonne Manifold AHI:                .  Supplemental Oxygen:

## 2018-05-23 NOTE — PLAN OF CARE
IR DRAIN FLUSHED WITH 10 CC OF NS AND 9 CC OF SS DRAINAGE ASPIRATED BACK. LO LEAKING NOTED, PROCEDURE WELL TOLERATED BY PATIENT.

## 2018-05-23 NOTE — CONSULTS
[facility] Hospitalist H&P       CC: Patient presents with:  Postop/Procedure Problem       PCP: Suzie Castro MD    History of Present Illness: Patient is a 29year old female with PMH sig for recent  on , gestational HTN, APRIL and obesity is admit Hypertension Mother        Review of Systems  Comprehensive ROS reviewed and negative except for what's stated above. Including negative for chest pain, shortness of breath, syncope.        OBJECTIVE:  /81 (BP Location: Left arm)   Pulse 96   Temp 98 Peritoneal (cpt=49406)    Result Date: 5/22/2018  PROCEDURE:  CT DRAIN ABSCESS PERITONEAL (CPT=49406)  COMPARISON:  [facility] , CT ABDOMEN PELVIS IV CONTRAST, NO ORAL (ER), 5/21/2018, 21:43.   INDICATIONS:  abdominal wall abscess  DESCRIPTION OF PROCEDURE:  Th registry index. CONCLUSION:  CT guided drainage of a likely infected anterior abdominal/anterior abdominal wall hematoma.     Dictated by: Stephanie Gant MD on 5/22/2018 at 14:08     Approved by: Stephanie Gant MD            Xr Chest Ap Portable  (cpt=71045 350  FINDINGS:  LIVER:  Within the medial segment of the left lobe of the liver is a small, low attenuating nonenhancing focus measuring 0.9 x 1.3 cm most consistent with a small cyst versus hemangioma.   However this is not previously seen on prior imaging the previous studies and appears to have appeared since previous imaging. Dictated by: Vu Yeboah DO on 5/21/2018 at 22:02     Approved by:  Vu Yeboah DO               ASSESSMENT / PLAN:     Gestational HTN - now persistently elevated bp's  -impro

## 2018-05-23 NOTE — PAYOR COMM NOTE
--------------  CONTINUED STAY REVIEW    Payor: Yovani Sanders #:  [id number]  Authorization Number: [id number]    Admit date: 5/22/18  Admit time: 0034    Admitting Physician: Santos Yang MD  Attending Physicia with therapeutic plan as outlined  Anu Puente DO  [facility] Hospitalist  Pager [phone]    Pt feels better than yesterday.   Drain site is painful  afebrile  -180/73-97  p 90     Abd soft nt  Draining dark red material from drain  Lochia minimal    Route User    5/23/2018 0826 Given 30 mg Oral Jeremy Moncada RN    5/22/2018 2045 Given 30 mg Oral Alfredo Culp RN      Piperacillin Sod-Tazobactam So (ZOSYN) 4.5 g in dextrose 5 % 100 mL ADD-vantage     Date Action Dose Route User

## 2018-05-23 NOTE — IMAGING NOTE
Infected ant abd wall hematoma s/p perc drain. 430 ml output last day. GPC. Cont gravity bag drainage.

## 2018-05-23 NOTE — PROGRESS NOTES
2004- Call received by prior RN Jose Alfredo Schneider of positive abdominal fluid cultures. Recheck hgb of 7.1. Dr Fernanda Dias on call informed of the above. No further orders received. Patient receiving IV zosyn.      2300- Patient with elevated BP of 185/97 after PO procardia

## 2018-05-23 NOTE — CONSULTS
INFECTIOUS DISEASE CONSULT NOTE    Joce Ibarra Patient Status:  Inpatient    9/10/1985 MRN [id number]   [facility] 3NW-A Attending Bam Alba MD   Hosp Day # 1 PCP [facility] months ago. She started smoking about 12 years ago. She has a 1.00 pack-year smoking history. She has never used smokeless tobacco. She reports that she does not drink alcohol or use drugs.     Allergies:  No Known Allergies    Medications:    Current Facil 3.34*  3.02*  3.15*   HGB  7.4*  6.8*  7.1*   HCT  24.9*  22.7*  23.4*   MCV  74.6*  75.2*  74.3*   MCH  22.2*  22.5*  22.5*   MCHC  29.7*  30.0*  30.3*   RDW  19.0*  19.0*  18.9*   NEPRELIM  14.34*  14.21*  14.62*   WBC  19.5*  18.8*  19.4*   PLT  481.0* 18 gauge Coley needle was advanced into the collection. A guidewire was coiled followed with placement of a 14 [place]  pigtail catheter. This was secured at the skin exit site and a sample drawn for laboratory testing.   Character thick dark blood tinge COMPARISON:  [facility] , CTA CHEST + CT ABD (W) + CT PEL (W) (CPT=71275/77617), 2017, 1:12. [facility] , CT ABDOMEN+PELVIS KIDNEYSTONE 2D RNDR(NO IV,NO ORAL)(CPT=74176), 2015, 15:49.   INDICATIONS:  51-year-old female who is status post recent  dimensions respectively. URINARY BLADDER:  No visible focal wall thickening, lesion, or calculus. PELVIC NODES:  No adenopathy. PELVIC ORGANS:  There is enlarged appearance to the uterus consistent with a post gravid state.  BONES:  No bony lesion or frac

## 2018-05-24 PROCEDURE — 85025 COMPLETE CBC W/AUTO DIFF WBC: CPT | Performed by: OBSTETRICS & GYNECOLOGY

## 2018-05-24 PROCEDURE — 94660 CPAP INITIATION&MGMT: CPT

## 2018-05-24 PROCEDURE — 82565 ASSAY OF CREATININE: CPT | Performed by: OBSTETRICS & GYNECOLOGY

## 2018-05-24 NOTE — CONSULTS
[facility] dosing service    Initial Pharmacokinetic Consult for Vancomycin Dosing     Makayla Alvarado is a 29year old female who is being treated for cellulitis. Pharmacy has been asked to dose Vancomycin by Dr. Tam Crowe    She has No Known Allergies. (Preliminary result)   Collection Time: 05/21/18 8:29 PM   Result Value Ref Range   Blood Culture Result No Growth 2 Days N/A       Based on the above:    1.  This patient will receive a loading dose of Vancomycin  2 gm IVPB (25mg/kg, capped at 2g) x 1 dose

## 2018-05-24 NOTE — PROGRESS NOTES
ANDREY Hospitalist Progress Note     [facility]      SUBJECTIVE:  Patient feeling well  Pain is improving  No fevers or chills  Eating well    OBJECTIVE:  Temp:  [97.9 °F (36.6 °C)-98.4 °F (36.9 °C)] 97.9 °F (36.6 °C)  Pulse:  [] 96  Resp:  [18-2 Technologist)  Pt. is post op c section 5/11. Headache and abdomen pain. FINDINGS:  Cardiac size and pulmonary vasculature are within normal limits. No pleural effusions. No pneumothorax. CONCLUSION:  No acute findings.      Dictated by: Farzana Figueroa RETROPERITONEUM:  No mass or adenopathy. BOWEL/MESENTERY:  No visible mass, obstruction, or bowel wall thickening.   ABDOMINAL WALL:  Along the inferior aspect of the rectus sheath/ anterior abdominal wall, within the midline along the surgical site is a l Discussion of risks included, but was not limited to infection, bleeding, organs/nerve injury. All questions  were answered. They voiced understanding and wished to proceed. Witnessed verbal and written informed consent were obtained.   Time-out was perf BID   ferrous sulfate EC tab 325 mg 325 mg Oral TID CC   Senna (SENOKOT) tab TABS 8.6 mg 8.6 mg Oral BID   Normal Saline Flush 0.9 % injection 10 mL 10 mL Intravenous Q12H   ondansetron HCl (ZOFRAN) injection 4 mg 4 mg Intravenous Q4H PRN   NIFEdipine ER O

## 2018-05-24 NOTE — PLAN OF CARE
Problem: Patient/Family Goals  Goal: Patient/Family Long Term Goal  Patient's Long Term Goal: go home to take care of my baby    Interventions:  - monitor ir drain and fevers  - See additional Care Plan goals for specific interventions  Outcome: Progressin

## 2018-05-24 NOTE — PLAN OF CARE
Discharge to home or other facility with appropriate resources Progressing      Maintains hematologic stability Progressing      Verbalizes/displays adequate comfort level or patient's stated pain goal Progressing      Patient/Family Long Term Goal Progres

## 2018-05-24 NOTE — PROGRESS NOTES
[address] [place] [address]  TEL: [phone]  FAX: [phone]    [address] Patient Status:  Inpatient    9/10/1985 MRN [id number]   [facility] 3NW-A Attending Juan Jose Hernandez MD   Hosp Day # 2 PCP [facility] Clifford Garcia to cover for MRSA for now in view of recent surgery, vanco added     2.  Leukocytosis- reactive to above, cont to follow     3. s/p Csection- incision is clean, does not seem infected     PLAN:     -IV vanco and unasyn for now  -await cx and adjust abx as n

## 2018-05-24 NOTE — PROGRESS NOTES
Gyn progress Note    Appreciate ID input and recommnedations  S less pain, down to 5/10  O afebrile  Abscess drainage less  Incision CDI    WBC 13 (from 19)  UC neg  Abscess culture ID still pending    A Postop CS at [facility]  Abdominal wall abscess- respo

## 2018-05-24 NOTE — PROGRESS NOTES
[facility]  Progress Note      Citlaly Love Patient Status:  Inpatient    9/10/1985 MRN [id number]   [facility] 3NW-A Attending Sheba Larson MD   Hosp Day # 2 PCP Allison Asencio MD       Subjective:   Pain at site of tube    O

## 2018-05-25 VITALS
WEIGHT: 293 LBS | SYSTOLIC BLOOD PRESSURE: 155 MMHG | HEIGHT: 64 IN | OXYGEN SATURATION: 96 % | DIASTOLIC BLOOD PRESSURE: 89 MMHG | BODY MASS INDEX: 50.02 KG/M2 | HEART RATE: 90 BPM | TEMPERATURE: 99 F | RESPIRATION RATE: 18 BRPM

## 2018-05-25 PROCEDURE — 85025 COMPLETE CBC W/AUTO DIFF WBC: CPT | Performed by: INTERNAL MEDICINE

## 2018-05-25 PROCEDURE — 80048 BASIC METABOLIC PNL TOTAL CA: CPT | Performed by: INTERNAL MEDICINE

## 2018-05-25 PROCEDURE — 82565 ASSAY OF CREATININE: CPT | Performed by: OBSTETRICS & GYNECOLOGY

## 2018-05-25 RX ORDER — SULFAMETHOXAZOLE AND TRIMETHOPRIM 800; 160 MG/1; MG/1
1 TABLET ORAL 2 TIMES DAILY
Qty: 20 TABLET | Refills: 0 | Status: SHIPPED | OUTPATIENT
Start: 2018-05-25 | End: 2018-06-04

## 2018-05-25 RX ORDER — PSEUDOEPHEDRINE HCL 30 MG
100 TABLET ORAL 2 TIMES DAILY
Qty: 60 CAPSULE | Refills: 0 | Status: SHIPPED | OUTPATIENT
Start: 2018-05-25 | End: 2018-05-25

## 2018-05-25 RX ORDER — MELATONIN
325
Qty: 90 TABLET | Refills: 0 | Status: SHIPPED | OUTPATIENT
Start: 2018-05-25 | End: 2018-05-25

## 2018-05-25 RX ORDER — AMOXICILLIN AND CLAVULANATE POTASSIUM 875; 125 MG/1; MG/1
1 TABLET, FILM COATED ORAL 2 TIMES DAILY
Qty: 20 TABLET | Refills: 0 | Status: SHIPPED | OUTPATIENT
Start: 2018-05-25 | End: 2018-06-04

## 2018-05-25 RX ORDER — HYDROCODONE BITARTRATE AND ACETAMINOPHEN 5; 325 MG/1; MG/1
1 TABLET ORAL EVERY 6 HOURS PRN
Qty: 14 TABLET | Refills: 0 | Status: SHIPPED | OUTPATIENT
Start: 2018-05-25 | End: 2018-08-21

## 2018-05-25 RX ORDER — PSEUDOEPHEDRINE HCL 30 MG
100 TABLET ORAL 2 TIMES DAILY
Qty: 60 CAPSULE | Refills: 0 | Status: SHIPPED | OUTPATIENT
Start: 2018-05-25 | End: 2018-08-21

## 2018-05-25 RX ORDER — MELATONIN
325
Qty: 90 TABLET | Refills: 0 | Status: SHIPPED | OUTPATIENT
Start: 2018-05-25 | End: 2018-08-21

## 2018-05-25 NOTE — PROGRESS NOTES
Name:Tami Serrano  MRN#:[id number]  :9/10/1985      Subjective:  Feeling better. Less pain in abdominal wall. Objective:  Right lower abdominal drain site is clean, dry and intact.     Vital Signs:  Blood pressure 142/84, pulse 86, temperature 97

## 2018-05-25 NOTE — PROGRESS NOTES
Pharmacy dosing service    Initial Pharmacokinetic Consult for Vancomycin Dosing     Sherry Smith is a 29year old female admitted on 5/21/2018 who is being treated for an infected abdominal wall hematoma.   Pharmacy has been asked to dose Vancomycin b mg/kg, capped at 2 g). This will be followed by vancomycin 1250 mg (15 mg/kg based on adjusted body weight) IVPB every 12 hours. 2.  Pharmacy ordered Vancomycin trough level(s) prior to 4th dose. Goal trough level 15-20 ug/mL.     3.  Pharmacy will ne

## 2018-05-25 NOTE — RESPIRATORY THERAPY NOTE
APRIL - Equipment Use Daily Summary:                  . Set Mode: AUTO CPAP WITH C-FLEX                . Usage in hours: 0.3                . 90% Pressure (EPAP) level: 4.0                . 90% Insp. Pressure (IPAP): Lynn Quevedo  AHI: 23.3

## 2018-05-25 NOTE — PROGRESS NOTES
CALLED THIS RN TO SAY PATIENTS CULTURES CAME BACK AND PATIENT CAN GO HOME ON ORAL ANTIBIOTICS. 1812: PAGED DR. LOERA TO GET DISCHARGE INSTRUCTIONS FOR PERC DRAIN. ORDERS RECEIVED.

## 2018-05-25 NOTE — PROGRESS NOTES
ANDREY Hospitalist Progress Note     [facility]      SUBJECTIVE:  Patient feeling well  Pain is improving  No fevers or chills  Eating well    OBJECTIVE:  Temp:  [97.8 °F (36.6 °C)-98.6 °F (37 °C)] 97.8 °F (36.6 °C)  Pulse:  [80-96] 86  Resp:  [18-20] (CPT=71010), 8/01/2013, 20:06. DIPIKA , CHEST PA   LATERAL, 1/19/2013, 15:16. INDICATIONS:  1 wk post c section pain/fever/dysuria  PATIENT STATED HISTORY: (As transcribed by Technologist)  Pt. is post op c section 5/11. Headache and abdomen pain.     FI SPLEEN:  No enlargement or focal lesion. KIDNEYS:  No mass, obstruction, or calcification. ADRENALS:  No mass or enlargement. AORTA/VASCULAR:  No aneurysm or dissection. RETROPERITONEUM:  No mass or adenopathy.   BOWEL/MESENTERY:  No visible mass, obstr abdominal wall abscess  DESCRIPTION OF PROCEDURE:  The procedure was discussed in detail with the patient and her mother. Review of benefits, alternatives, and risks.   Discussion of risks included, but was not limited to infection, bleeding, organs/nerve MD               Meds:     Prior to Admission Medications:  ferrous sulfate 325 (65 FE) MG Oral Tab EC Take 1 tablet (325 mg total) by mouth 3 (three) times daily with meals. docusate sodium 100 MG Oral Cap Take 100 mg by mouth 2 (two) times daily. infection  -started ferrous sulfate   -transfuse if < 7  -Hgb up todate    Prophy:  DVT: SCDs    Dispo: admitted with infected wall hematoma, on IV abx. Dipso pending culture results and antibiotic course.     Questions/concerns were discussed with patient

## 2018-05-25 NOTE — PROGRESS NOTES
Yovani Siu Patient Status:  Inpatient    9/10/1985 MRN [id number]   [facility] 3NW-A Attending Rose Mary Silva MD   Hosp Day # 3 PCP Joie Sheriff MD       SUBJECTIVE:  Makayla Alvarado is a 29year old female.

## 2018-05-25 NOTE — PROGRESS NOTES
PAGED  TO INQUIRE ABOUT POSSIBLE DISCHARGE TODAY AND WHETHER IT WOULD BE WITH IV OR ORAL ANTIBIOTICS.  WILL SEE PATIENT LATER BUT STATED THAT CULTURES ARE NOT BACK YET SO PATIENT WILL LIKELY NEED TO BE HERE ANOTHER NIGHT.

## 2018-05-25 NOTE — PLAN OF CARE
Pt a&o, VSS. On RA while awake, refused CPAP overnight. Tele NSR 80's. Hypoactive bowel sounds, denies flatus. Incision intact, dressing c/d/I. norco and motrin administered for pain. Drain noted to right side, bloody output noted, flushed per order.  Pt re

## 2018-05-25 NOTE — PAYOR COMM NOTE
--------------  CONTINUED STAY REVIEW    Payor: Yovani Sanders #:  [id number]  Authorization Number: [id number]    Admit date: 5/22/18  Admit time: 0034 5/24:    BLAINE:     Appreciate ID input and recommnedations  S Vanc per ID  BP controlled with Procardia  Pain controlled with oral analgesia  Home today if cleared by ID with oral regimen, may f/u with her OB    Lab  05/21/18 2029 05/22/18   1004  05/22/18   1152  05/22/18   1934  05/24/18   0500  05/25/18   6473 (MOTRIN) tab 600 mg     Date Action Dose Route User    5/25/2018 1400 Given 600 mg Oral Newton Montanez RN    5/24/2018 2154 Given 600 mg Oral Yahaira Shepherd RN      NIFEdipine ER Osmotic Release (PROCARDIA XL) 24 hr tab 30 mg     Date Action Dose Rout

## 2018-05-26 NOTE — PROGRESS NOTES
DISCHARGE INSTRUCTIONS GIVEN TO PATIENT AND WERE EXPLAINED IN DETAIL.  PRESCRIPTIONS FOR COLACE, NORCO, AUGMENTIN, BACTRIM, AND FERROUS SULFATE WERE FILLED AND DELIVERED TO ROOM BY [facility]-EDUCATED PATIENT IN DETAIL ON INDIVIDUAL USES AND WHEN TO TA

## 2018-05-30 NOTE — DISCHARGE SUMMARY
General Medicine Discharge Summary     Patient ID:  aLuri Tay  29year old  9/10/1985    Admit date: 5/21/2018    Discharge date and time: 5/25/2018  8:00 PM     Attending Physician: Yara Tracey MD    Primary Care Physician: Aida Magallon MD times daily. , Print Script, Disp-20 tablet, R-0      CONTINUE these medications which have CHANGED    HYDROcodone-acetaminophen 5-325 MG Oral Tab  Take 1 tablet by mouth every 6 (six) hours as needed. , Print, Disp-14 tablet, R-0    ferrous sulfate 325 (65

## 2018-06-06 ENCOUNTER — HOSPITAL ENCOUNTER (OUTPATIENT)
Dept: CT IMAGING | Facility: HOSPITAL | Age: 33
Discharge: HOME | End: 2018-06-06
Attending: RADIOLOGY
Payer: MEDICAID

## 2018-06-06 DIAGNOSIS — K61.0 PERIANAL ABSCESS: ICD-10-CM

## 2018-06-06 PROCEDURE — 76080 X-RAY EXAM OF FISTULA: CPT | Performed by: RADIOLOGY

## 2018-06-06 PROCEDURE — 49424 ASSESS CYST CONTRAST INJECT: CPT | Performed by: RADIOLOGY

## 2018-08-21 PROCEDURE — 88175 CYTOPATH C/V AUTO FLUID REDO: CPT | Performed by: OBSTETRICS & GYNECOLOGY

## 2018-08-21 PROCEDURE — 87624 HPV HI-RISK TYP POOLED RSLT: CPT | Performed by: OBSTETRICS & GYNECOLOGY

## 2018-10-15 ENCOUNTER — APPOINTMENT (OUTPATIENT)
Dept: CT IMAGING | Age: 33
End: 2018-10-15
Attending: EMERGENCY MEDICINE
Payer: MEDICAID

## 2018-10-15 ENCOUNTER — CHARTING TRANS (OUTPATIENT)
Dept: OTHER | Age: 33
End: 2018-10-15

## 2018-10-15 ENCOUNTER — HOSPITAL ENCOUNTER (EMERGENCY)
Age: 33
Discharge: HOME | End: 2018-10-15
Attending: EMERGENCY MEDICINE
Payer: MEDICAID

## 2018-10-15 VITALS
BODY MASS INDEX: 47.8 KG/M2 | WEIGHT: 280 LBS | TEMPERATURE: 99 F | DIASTOLIC BLOOD PRESSURE: 109 MMHG | HEART RATE: 73 BPM | RESPIRATION RATE: 15 BRPM | HEIGHT: 64 IN | OXYGEN SATURATION: 96 % | SYSTOLIC BLOOD PRESSURE: 163 MMHG

## 2018-10-15 DIAGNOSIS — R51.9 ACUTE NONINTRACTABLE HEADACHE, UNSPECIFIED HEADACHE TYPE: Primary | ICD-10-CM

## 2018-10-15 PROCEDURE — 99284 EMERGENCY DEPT VISIT MOD MDM: CPT

## 2018-10-15 PROCEDURE — 96375 TX/PRO/DX INJ NEW DRUG ADDON: CPT

## 2018-10-15 PROCEDURE — 99285 EMERGENCY DEPT VISIT HI MDM: CPT

## 2018-10-15 PROCEDURE — 96361 HYDRATE IV INFUSION ADD-ON: CPT

## 2018-10-15 PROCEDURE — 70450 CT HEAD/BRAIN W/O DYE: CPT | Performed by: EMERGENCY MEDICINE

## 2018-10-15 PROCEDURE — 96365 THER/PROPH/DIAG IV INF INIT: CPT

## 2018-10-15 RX ORDER — DIPHENHYDRAMINE HYDROCHLORIDE 50 MG/ML
25 INJECTION INTRAMUSCULAR; INTRAVENOUS ONCE
Status: COMPLETED | OUTPATIENT
Start: 2018-10-15 | End: 2018-10-15

## 2018-10-15 RX ORDER — KETOROLAC TROMETHAMINE 30 MG/ML
15 INJECTION, SOLUTION INTRAMUSCULAR; INTRAVENOUS ONCE
Status: COMPLETED | OUTPATIENT
Start: 2018-10-15 | End: 2018-10-15

## 2018-10-15 RX ORDER — DEXAMETHASONE SODIUM PHOSPHATE 4 MG/ML
10 VIAL (ML) INJECTION ONCE
Status: COMPLETED | OUTPATIENT
Start: 2018-10-15 | End: 2018-10-15

## 2018-10-15 RX ORDER — METOCLOPRAMIDE HYDROCHLORIDE 5 MG/ML
10 INJECTION INTRAMUSCULAR; INTRAVENOUS ONCE
Status: COMPLETED | OUTPATIENT
Start: 2018-10-15 | End: 2018-10-15

## 2018-10-15 NOTE — ED PROVIDER NOTES
Patient Seen in: [facility] Emergency Department In [place]    History   Patient presents with:  Headache (neurologic)    Stated Complaint: HEADACHE FOR PAST 8 DAYS    HPI    74-year-old with a history of obesity, hypertension and sleep apnea presents for HPI.  Constitutional and vital signs reviewed. All other systems reviewed and negative except as noted above.     Physical Exam     ED Triage Vitals [10/15/18 1117]   BP (!) 182/112   Pulse 74   Resp 18   Temp 98.5 °F (36.9 °C)   Temp src Temporal   Sp ordered. MDM   On re-Evaluation patient states her pain is gone  CT brain is unremarkable.   I did consider more serious conditions such as subarachnoid hemorrhage, intracranial mass, meningitis, venous sinus thrombosis and I do not believe any of thes

## 2018-12-15 ENCOUNTER — APPOINTMENT (OUTPATIENT)
Dept: GENERAL RADIOLOGY | Age: 33
End: 2018-12-15
Attending: EMERGENCY MEDICINE
Payer: MEDICAID

## 2018-12-15 ENCOUNTER — APPOINTMENT (OUTPATIENT)
Dept: ULTRASOUND IMAGING | Age: 33
End: 2018-12-15
Attending: EMERGENCY MEDICINE
Payer: MEDICAID

## 2018-12-15 ENCOUNTER — HOSPITAL ENCOUNTER (EMERGENCY)
Age: 33
Discharge: HOME | End: 2018-12-15
Attending: EMERGENCY MEDICINE
Payer: MEDICAID

## 2018-12-15 VITALS
DIASTOLIC BLOOD PRESSURE: 78 MMHG | HEIGHT: 64 IN | WEIGHT: 280 LBS | SYSTOLIC BLOOD PRESSURE: 145 MMHG | RESPIRATION RATE: 18 BRPM | HEART RATE: 70 BPM | TEMPERATURE: 99 F | OXYGEN SATURATION: 99 % | BODY MASS INDEX: 47.8 KG/M2

## 2018-12-15 DIAGNOSIS — R60.0 PEDAL EDEMA: ICD-10-CM

## 2018-12-15 DIAGNOSIS — I10 HYPERTENSION, UNSPECIFIED TYPE: Primary | ICD-10-CM

## 2018-12-15 DIAGNOSIS — E87.6 HYPOKALEMIA: ICD-10-CM

## 2018-12-15 PROCEDURE — 99285 EMERGENCY DEPT VISIT HI MDM: CPT

## 2018-12-15 PROCEDURE — 84484 ASSAY OF TROPONIN QUANT: CPT | Performed by: EMERGENCY MEDICINE

## 2018-12-15 PROCEDURE — 71046 X-RAY EXAM CHEST 2 VIEWS: CPT | Performed by: EMERGENCY MEDICINE

## 2018-12-15 PROCEDURE — 85025 COMPLETE CBC W/AUTO DIFF WBC: CPT | Performed by: EMERGENCY MEDICINE

## 2018-12-15 PROCEDURE — 85730 THROMBOPLASTIN TIME PARTIAL: CPT | Performed by: EMERGENCY MEDICINE

## 2018-12-15 PROCEDURE — 81025 URINE PREGNANCY TEST: CPT

## 2018-12-15 PROCEDURE — 93010 ELECTROCARDIOGRAM REPORT: CPT

## 2018-12-15 PROCEDURE — 93005 ELECTROCARDIOGRAM TRACING: CPT

## 2018-12-15 PROCEDURE — 85610 PROTHROMBIN TIME: CPT | Performed by: EMERGENCY MEDICINE

## 2018-12-15 PROCEDURE — 80053 COMPREHEN METABOLIC PANEL: CPT | Performed by: EMERGENCY MEDICINE

## 2018-12-15 PROCEDURE — 36415 COLL VENOUS BLD VENIPUNCTURE: CPT

## 2018-12-15 PROCEDURE — 93970 EXTREMITY STUDY: CPT | Performed by: EMERGENCY MEDICINE

## 2018-12-15 RX ORDER — HYDROCHLOROTHIAZIDE 25 MG/1
25 TABLET ORAL DAILY
Qty: 21 TABLET | Refills: 3 | Status: SHIPPED | OUTPATIENT
Start: 2018-12-15 | End: 2019-11-21

## 2018-12-15 RX ORDER — METHYLDOPA 250 MG/1
250 TABLET, FILM COATED ORAL 2 TIMES DAILY
Qty: 21 TABLET | Refills: 0 | Status: SHIPPED | OUTPATIENT
Start: 2018-12-15 | End: 2019-06-19 | Stop reason: ALTCHOICE

## 2018-12-15 RX ORDER — POTASSIUM CHLORIDE 1.5 G/1.77G
20 POWDER, FOR SOLUTION ORAL DAILY
Qty: 2 PACKET | Refills: 0 | Status: SHIPPED | OUTPATIENT
Start: 2018-12-15 | End: 2018-12-17

## 2018-12-15 RX ORDER — ARIPIPRAZOLE 15 MG/1
20 TABLET ORAL ONCE
Status: COMPLETED | OUTPATIENT
Start: 2018-12-15 | End: 2018-12-15

## 2018-12-15 NOTE — ED INITIAL ASSESSMENT (HPI)
Pt c/o swelling to bilateral legs and feet x 3 days. Denies recent long travel. Denies chest pain, SOB.

## 2018-12-15 NOTE — ED PROVIDER NOTES
Patient Seen in: [facility] In [place]    History   Patient presents with:  Swelling Edema (cardiovascular, metabolic): FEET AND ANKLE SWOLLEN FOR THREE DAYS. Stated Complaint: FEET AND ANKLE SWELLING FOR 3 DAYS.     HPI    The corona complaint: FEET AND ANKLE SWELLING FOR 3 DAYS. Other systems are as noted in HPI. Constitutional and vital signs reviewed. All other systems reviewed and negative except as noted above.     Physical Exam     ED Triage Vitals [12/15/18 0991]   BP (!) Result Value    Potassium 3.4 (*)     AST 13 (*)     A/G Ratio 0.9 (*)     All other components within normal limits   CBC W/ DIFFERENTIAL - Abnormal; Notable for the following components:    HGB 11.1 (*)     .0 (*)     MCV 76.7 (*)     MCH 22 creatinine, and blood sugar which shows evidence of some mild hypokalemia with a potassium of 3.4 for which the patient was given oral potassium in the emergency room. Patient liver function tests and troponin are negative.   Patient's coags are [facility] days.  Carol Fitzgerald: 2 packet Refills: 0

## 2019-01-07 NOTE — ED NOTES
Detail Level: Zone Pt counseled by nurse and physician regarding unmanaged hypertension. Physician informed PT it is imperative to follow-up with OBGYN Tuesday to determine status of pregnancy and to get back on an appropriate HTN medication when pregnancy status determined. Detail Level: Detailed Benzoyl Peroxide Pregnancy And Lactation Text: This medication is Pregnancy Category C. It is unknown if benzoyl peroxide is excreted in breast milk. High Dose Vitamin A Pregnancy And Lactation Text: High dose vitamin A therapy is contraindicated during pregnancy and breast feeding. Erythromycin Counseling:  I discussed with the patient the risks of erythromycin including but not limited to GI upset, allergic reaction, drug rash, diarrhea, increase in liver enzymes, and yeast infections. Tazorac Pregnancy And Lactation Text: This medication is not safe during pregnancy. It is unknown if this medication is excreted in breast milk. Dapsone Pregnancy And Lactation Text: This medication is Pregnancy Category C and is not considered safe during pregnancy or breast feeding. Birth Control Pills Pregnancy And Lactation Text: This medication should be avoided if pregnant and for the first 30 days post-partum. Include Pregnancy/Lactation Warning?: No Topical Clindamycin Pregnancy And Lactation Text: This medication is Pregnancy Category B and is considered safe during pregnancy. It is unknown if it is excreted in breast milk. Tazorac Counseling:  Patient advised that medication is irritating and drying.  Patient may need to apply sparingly and wash off after an hour before eventually leaving it on overnight.  The patient verbalized understanding of the proper use and possible adverse effects of tazorac.  All of the patient's questions and concerns were addressed. High Dose Vitamin A Counseling: Side effects reviewed, pt to contact office should one occur. Doxycycline Counseling:  Patient counseled regarding possible photosensitivity and increased risk for sunburn.  Patient instructed to avoid sunlight, if possible.  When exposed to sunlight, patients should wear protective clothing, sunglasses, and sunscreen.  The patient was instructed to call the office immediately if the following severe adverse effects occur:  hearing changes, easy bruising/bleeding, severe headache, or vision changes.  The patient verbalized understanding of the proper use and possible adverse effects of doxycycline.  All of the patient's questions and concerns were addressed. Topical Sulfur Applications Counseling: Topical Sulfur Counseling: Patient counseled that this medication may cause skin irritation or allergic reactions.  In the event of skin irritation, the patient was advised to reduce the amount of the drug applied or use it less frequently.   The patient verbalized understanding of the proper use and possible adverse effects of topical sulfur application.  All of the patient's questions and concerns were addressed. Minocycline Counseling: Patient advised regarding possible photosensitivity and discoloration of the teeth, skin, lips, tongue and gums.  Patient instructed to avoid sunlight, if possible.  When exposed to sunlight, patients should wear protective clothing, sunglasses, and sunscreen.  The patient was instructed to call the office immediately if the following severe adverse effects occur:  hearing changes, easy bruising/bleeding, severe headache, or vision changes.  The patient verbalized understanding of the proper use and possible adverse effects of minocycline.  All of the patient's questions and concerns were addressed. Bactrim Counseling:  I discussed with the patient the risks of sulfa antibiotics including but not limited to GI upset, allergic reaction, drug rash, diarrhea, dizziness, photosensitivity, and yeast infections.  Rarely, more serious reactions can occur including but not limited to aplastic anemia, agranulocytosis, methemoglobinemia, blood dyscrasias, liver or kidney failure, lung infiltrates or desquamative/blistering drug rashes. Topical Retinoid counseling:  Patient advised to apply a pea-sized amount only at bedtime and wait 30 minutes after washing their face before applying.  If too drying, patient may add a non-comedogenic moisturizer. The patient verbalized understanding of the proper use and possible adverse effects of retinoids.  All of the patient's questions and concerns were addressed. Topical Sulfur Applications Pregnancy And Lactation Text: This medication is Pregnancy Category C and has an unknown safety profile during pregnancy. It is unknown if this topical medication is excreted in breast milk. Minocycline Pregnancy And Lactation Text: This medication is Pregnancy Category D and not consider safe during pregnancy. It is also excreted in breast milk. Azithromycin Counseling:  I discussed with the patient the risks of azithromycin including but not limited to GI upset, allergic reaction, drug rash, diarrhea, and yeast infections. Birth Control Pills Counseling: Birth Control Pill Counseling: I discussed with the patient the potential side effects of OCPs including but not limited to increased risk of stroke, heart attack, thrombophlebitis, deep venous thrombosis, hepatic adenomas, breast changes, GI upset, headaches, and depression.  The patient verbalized understanding of the proper use and possible adverse effects of OCPs. All of the patient's questions and concerns were addressed. Azithromycin Pregnancy And Lactation Text: This medication is considered safe during pregnancy and is also secreted in breast milk. Spironolactone Pregnancy And Lactation Text: This medication can cause feminization of the male fetus and should be avoided during pregnancy. The active metabolite is also found in breast milk. Isotretinoin Pregnancy And Lactation Text: This medication is Pregnancy Category X and is considered extremely dangerous during pregnancy. It is unknown if it is excreted in breast milk. Dapsone Counseling: I discussed with the patient the risks of dapsone including but not limited to hemolytic anemia, agranulocytosis, rashes, methemoglobinemia, kidney failure, peripheral neuropathy, headaches, GI upset, and liver toxicity.  Patients who start dapsone require monitoring including baseline LFTs and weekly CBCs for the first month, then every month thereafter.  The patient verbalized understanding of the proper use and possible adverse effects of dapsone.  All of the patient's questions and concerns were addressed. Benzoyl Peroxide Counseling: Patient counseled that medicine may cause skin irritation and bleach clothing.  In the event of skin irritation, the patient was advised to reduce the amount of the drug applied or use it less frequently.   The patient verbalized understanding of the proper use and possible adverse effects of benzoyl peroxide.  All of the patient's questions and concerns were addressed. Topical Retinoid Pregnancy And Lactation Text: This medication is Pregnancy Category C. It is unknown if this medication is excreted in breast milk. Isotretinoin Counseling: Patient should get monthly blood tests, not donate blood, not drive at night if vision affected, not share medication, and not undergo elective surgery for 6 months after tx completed. Side effects reviewed, pt to contact office should one occur. Tetracycline Counseling: Patient counseled regarding possible photosensitivity and increased risk for sunburn.  Patient instructed to avoid sunlight, if possible.  When exposed to sunlight, patients should wear protective clothing, sunglasses, and sunscreen.  The patient was instructed to call the office immediately if the following severe adverse effects occur:  hearing changes, easy bruising/bleeding, severe headache, or vision changes.  The patient verbalized understanding of the proper use and possible adverse effects of tetracycline.  All of the patient's questions and concerns were addressed. Patient understands to avoid pregnancy while on therapy due to potential birth defects. Erythromycin Pregnancy And Lactation Text: This medication is Pregnancy Category B and is considered safe during pregnancy. It is also excreted in breast milk. Spironolactone Counseling: Patient advised regarding risks of diarrhea, abdominal pain, hyperkalemia, birth defects (for female patients), liver toxicity and renal toxicity. The patient may need blood work to monitor liver and kidney function and potassium levels while on therapy. The patient verbalized understanding of the proper use and possible adverse effects of spironolactone.  All of the patient's questions and concerns were addressed. Bactrim Pregnancy And Lactation Text: This medication is Pregnancy Category D and is known to cause fetal risk.  It is also excreted in breast milk. Doxycycline Pregnancy And Lactation Text: This medication is Pregnancy Category D and not consider safe during pregnancy. It is also excreted in breast milk but is considered safe for shorter treatment courses. Topical Clindamycin Counseling: Patient counseled that this medication may cause skin irritation or allergic reactions.  In the event of skin irritation, the patient was advised to reduce the amount of the drug applied or use it less frequently.   The patient verbalized understanding of the proper use and possible adverse effects of clindamycin.  All of the patient's questions and concerns were addressed.

## 2019-01-18 PROBLEM — N92.1 METRORRHAGIA: Status: ACTIVE | Noted: 2019-01-18

## 2019-01-18 PROBLEM — O14.90 PRE-ECLAMPSIA: Status: ACTIVE | Noted: 2019-01-18

## 2019-01-18 PROBLEM — G47.00 INSOMNIA: Status: ACTIVE | Noted: 2019-01-18

## 2019-01-18 PROBLEM — IMO0002 WOUND HEMATOMA: Status: ACTIVE | Noted: 2019-01-18

## 2019-01-18 PROBLEM — N94.9 FEMALE GENITAL SYMPTOMS: Status: ACTIVE | Noted: 2019-01-18

## 2019-01-18 PROBLEM — R35.0 INCREASED FREQUENCY OF URINATION: Status: ACTIVE | Noted: 2019-01-18

## 2019-01-18 PROBLEM — N83.209 CYST OF OVARY: Status: ACTIVE | Noted: 2019-01-18

## 2019-01-18 PROBLEM — O14.90 PRE-ECLAMPSIA (HCC): Status: ACTIVE | Noted: 2019-01-18

## 2019-01-18 PROBLEM — F43.20 ANACLITIC DEPRESSION: Status: ACTIVE | Noted: 2019-01-18

## 2019-01-18 PROBLEM — M54.9 BACKACHE: Status: ACTIVE | Noted: 2019-01-18

## 2019-01-18 PROBLEM — R10.2 PAIN IN PELVIS: Status: ACTIVE | Noted: 2019-01-18

## 2019-01-18 PROBLEM — R06.83 SNORING: Status: ACTIVE | Noted: 2019-01-18

## 2019-01-21 ENCOUNTER — TELEPHONE (OUTPATIENT)
Dept: FAMILY MEDICINE CLINIC | Facility: CLINIC | Age: 34
End: 2019-01-21

## 2019-06-19 ENCOUNTER — OFFICE VISIT (OUTPATIENT)
Dept: FAMILY MEDICINE CLINIC | Facility: CLINIC | Age: 34
End: 2019-06-19
Payer: COMMERCIAL

## 2019-06-19 VITALS
BODY MASS INDEX: 50.02 KG/M2 | WEIGHT: 293 LBS | RESPIRATION RATE: 16 BRPM | SYSTOLIC BLOOD PRESSURE: 142 MMHG | DIASTOLIC BLOOD PRESSURE: 90 MMHG | TEMPERATURE: 97 F | HEART RATE: 85 BPM | HEIGHT: 64.25 IN | OXYGEN SATURATION: 97 %

## 2019-06-19 DIAGNOSIS — E66.01 CLASS 3 SEVERE OBESITY DUE TO EXCESS CALORIES WITHOUT SERIOUS COMORBIDITY WITH BODY MASS INDEX (BMI) OF 50.0 TO 59.9 IN ADULT (HCC): ICD-10-CM

## 2019-06-19 DIAGNOSIS — F41.9 ANXIETY AND DEPRESSION: ICD-10-CM

## 2019-06-19 DIAGNOSIS — F32.A ANXIETY AND DEPRESSION: ICD-10-CM

## 2019-06-19 DIAGNOSIS — I10 ESSENTIAL HYPERTENSION, BENIGN: Primary | ICD-10-CM

## 2019-06-19 DIAGNOSIS — F17.200 TOBACCO DEPENDENCE: ICD-10-CM

## 2019-06-19 PROCEDURE — 99203 OFFICE O/P NEW LOW 30 MIN: CPT | Performed by: FAMILY MEDICINE

## 2019-06-19 RX ORDER — HYDROCHLOROTHIAZIDE 25 MG/1
25 TABLET ORAL DAILY
Qty: 30 TABLET | Refills: 0 | Status: SHIPPED | OUTPATIENT
Start: 2019-06-19 | End: 2019-06-26

## 2019-06-19 RX ORDER — SERTRALINE HYDROCHLORIDE 25 MG/1
TABLET, FILM COATED ORAL
Qty: 90 TABLET | Refills: 1 | Status: SHIPPED | OUTPATIENT
Start: 2019-06-19 | End: 2020-01-10

## 2019-06-19 NOTE — PATIENT INSTRUCTIONS
Understanding E-Cigarettes  E-cigarettes have become a popular form of smoking. People may use these devices in place of regular cigarettes. Or they may use them to try to quit smoking altogether. What are e-cigarettes?   E-cigarettes are devices that al Experts worry that a smoker who uses e-cigarettes may not try other, proven ways to quit. A number of quit-smoking tools are available that have been approved by the FDA. If you are trying to quit smoking, see your healthcare provider for help.   Are they s © 7363-0821 [employer] [address]. [address] [facility] [place] [address] [place]. All rights reserved. This information is not intended as a substitute for professional medical care. Always follow your healthcare professional's instructions.         Eating · Managing calories. A calorie is a unit of energy. Your body burns calories for fuel, but if you eat more calories than your body burns, the extras are stored as fat. Your healthcare provider can help you create a diet plan to manage your calories.  This w A wren part of healthy cooking is cutting down on added fat and salt. Look on the internet for lower-fat, lower-sodium recipes. Also, try these tips:  · Remove fat from meat and skin from poultry before cooking.   · Skim fat from the surface of soups and bandar

## 2019-06-19 NOTE — PROGRESS NOTES
CHIEF COMPLAINT: Patient presents with:  Weight Problem: patient is here for weight management        HPI:     Warden Granados is a 34year old female presents for weight mgmt, HTN, and anxiety/depression.     Weight mgmt: Pt is here because she would apnea    • Unspecified essential hypertension    • Visual impairment       Past Surgical History:   Procedure Laterality Date   • ANESTH, SECTION     •      •  DELIVERY ONLY     • TUBAL LIGATION        Family History   Problem Rela Negative for chest pain, palpitations and leg swelling. Gastrointestinal: Negative for nausea, vomiting, abdominal pain and diarrhea. Neurological: Negative for dizziness, weakness, numbness and headaches.    Psychiatric/Behavioral: Positive for depress Protein 12/15/2018 7.6    • Albumin 12/15/2018 3.5    • Globulin  12/15/2018 4.1    • A/G Ratio 12/15/2018 0.9*   • Troponin 12/15/2018 <0.046    • PT 12/15/2018 13.5    • INR 12/15/2018 1.02    • PTT 12/15/2018 33.4    • POCT urine pregnancy 12/15/2018 Ne mouth daily. Dispense: 30 tablet; Refill: 0    2.  Class 3 severe obesity due to excess calories without serious comorbidity with body mass index (BMI) of 50.0 to 59.9 in adult [facility])   Counseled pt on appropriate healthy diet and regular exercise regimen t S/P  section     Anxiety and depression     Antepartum anemia     Spotting affecting pregnancy     Anemia     Leukocytosis     Hyperglycemia     Abdominal wall abscess at site of surgical wound     Anaclitic depression     Backache     Cyst of ovar

## 2019-06-21 ENCOUNTER — TELEPHONE (OUTPATIENT)
Dept: FAMILY MEDICINE CLINIC | Facility: CLINIC | Age: 34
End: 2019-06-21

## 2019-06-21 NOTE — TELEPHONE ENCOUNTER
LOV-06/19/2019  Patient asking for phentermine refill, unable to get into weight loss clinic until end of July. Please advise.

## 2019-06-21 NOTE — TELEPHONE ENCOUNTER
Spoke with patient stating unable to schedule office visit with WT loss clinic due to her job she only knows her schedule 1 week in advance. I offered patient office visit with Dr. Jeremie Gruber, patient declined.  I explained to patient Dr. Jeremie Gruber

## 2019-06-21 NOTE — TELEPHONE ENCOUNTER
Patient called and said she can not get into the weight loss clinic until the end of July and was wondering if Dr. Everton Jackson would prescribe the medication until she can get in. Patient said you can leave a message on her voice mail.

## 2019-06-21 NOTE — TELEPHONE ENCOUNTER
I replied to pt's MyChart message. I explained to her that Referral to the [facility] loss clinic was not only for weight loss, but a supervised wt loss program in case she decided she wanted bariatric surgery in the future.  Offered for her to schedule appt with o

## 2019-06-21 NOTE — TELEPHONE ENCOUNTER
If patient has her appt at the [facility] clinic in July and ultimately receive care from them, I prefer she be evaluated by them to discuss an appropriate wt loss medication. If not, then she needs to make an appt with to get started on medication.   I discu

## 2019-06-26 ENCOUNTER — OFFICE VISIT (OUTPATIENT)
Dept: FAMILY MEDICINE CLINIC | Facility: CLINIC | Age: 34
End: 2019-06-26
Payer: COMMERCIAL

## 2019-06-26 VITALS
RESPIRATION RATE: 17 BRPM | HEART RATE: 78 BPM | SYSTOLIC BLOOD PRESSURE: 118 MMHG | DIASTOLIC BLOOD PRESSURE: 76 MMHG | OXYGEN SATURATION: 98 % | BODY MASS INDEX: 50.02 KG/M2 | WEIGHT: 293 LBS | HEIGHT: 64.25 IN | TEMPERATURE: 98 F

## 2019-06-26 DIAGNOSIS — I10 ESSENTIAL HYPERTENSION, BENIGN: Primary | ICD-10-CM

## 2019-06-26 DIAGNOSIS — E66.01 CLASS 3 SEVERE OBESITY DUE TO EXCESS CALORIES WITHOUT SERIOUS COMORBIDITY WITH BODY MASS INDEX (BMI) OF 50.0 TO 59.9 IN ADULT (HCC): ICD-10-CM

## 2019-06-26 PROCEDURE — 99214 OFFICE O/P EST MOD 30 MIN: CPT | Performed by: FAMILY MEDICINE

## 2019-06-26 RX ORDER — PEN NEEDLE, DIABETIC 30 GX3/16"
1 NEEDLE, DISPOSABLE MISCELLANEOUS DAILY
Qty: 90 EACH | Refills: 2 | Status: SHIPPED | OUTPATIENT
Start: 2019-06-26 | End: 2020-01-03

## 2019-06-26 RX ORDER — PEN NEEDLE, DIABETIC 31 GX5/16"
1 NEEDLE, DISPOSABLE MISCELLANEOUS DAILY
Qty: 90 EACH | Refills: 1 | Status: SHIPPED | OUTPATIENT
Start: 2019-06-26 | End: 2021-02-19 | Stop reason: ALTCHOICE

## 2019-06-26 NOTE — PATIENT INSTRUCTIONS
Weight Management: Getting Started  Healthy bodies come in all shapes and sizes. Not all bodies are made to be thin. For some people, a healthy weight is higher than the average weight listed on weight charts.  Your healthcare provider can help you decide Encouragement from others can help make losing weight easier. Ask your family members and friends for support. They may even want to join you. Also look to your healthcare provider, registered dietitian, and  for help.  [facility] · Don’t be hard on yourself or give up if you slip. Be patient. Learn from your mistakes and adjust your plan if you need to. Then get right back to it. · Be realistic about your goals.  Talk with a dietitian or your healthcare provider about what goals ar

## 2019-06-26 NOTE — PROGRESS NOTES
CHIEF COMPLAINT: Patient presents with:  Weight Loss: discuss weight loss medications. HPI:     Stef Gilbert is a 34year old female presents for discuss wt loss medication and HTN f-u.     Obesity/Wt Loss Medication: Pt was referred to the Used    Alcohol use: No      Comment: not since pregnant    Drug use: No      Frequency: 1.0 times per week      Comment: not since pregnant, was using weed       Medications (Active prior to today's visit):    Current Outpatient Medications:  Liraglutide appears well-nourished. HENT:   Head: Normocephalic. Eyes: Conjunctivae are normal.   Cardiovascular: Normal rate, regular rhythm and normal heart sounds. No murmur heard. Edema not present.   Pulmonary/Chest: Effort normal and breath sounds normal 12/15/2018 11.1*   • HCT 12/15/2018 37.1    • PLT 12/15/2018 128.0*   • MCV 12/15/2018 76.7*   • MCH 12/15/2018 22.9*   • MCHC 12/15/2018 29.9*   • RDW 12/15/2018 17.3*   • RDW-SD 12/15/2018 48.6*   • Neutrophil Absolute Prel* 12/15/2018 5.63    • Neutroph (ALCOHOL PREP) Does not apply Pads; 1 Units by Does not apply route daily. Dispense: 90 each;  Refill: 1      Meds This Visit:  Requested Prescriptions     Signed Prescriptions Disp Refills   • Liraglutide -Weight Management (SAXENDA) 18 MG/3ML Subcutaneou

## 2019-07-01 ENCOUNTER — HOSPITAL ENCOUNTER (EMERGENCY)
Age: 34
Discharge: HOME | End: 2019-07-01
Payer: COMMERCIAL

## 2019-07-01 VITALS
RESPIRATION RATE: 16 BRPM | TEMPERATURE: 99 F | BODY MASS INDEX: 51 KG/M2 | OXYGEN SATURATION: 99 % | HEART RATE: 86 BPM | WEIGHT: 293 LBS | SYSTOLIC BLOOD PRESSURE: 175 MMHG | DIASTOLIC BLOOD PRESSURE: 90 MMHG

## 2019-07-01 DIAGNOSIS — S39.012A LUMBAR STRAIN, INITIAL ENCOUNTER: Primary | ICD-10-CM

## 2019-07-01 PROCEDURE — 96372 THER/PROPH/DIAG INJ SC/IM: CPT

## 2019-07-01 PROCEDURE — 99283 EMERGENCY DEPT VISIT LOW MDM: CPT

## 2019-07-01 RX ORDER — CYCLOBENZAPRINE HCL 10 MG
10 TABLET ORAL 3 TIMES DAILY PRN
Qty: 15 TABLET | Refills: 0 | Status: SHIPPED | OUTPATIENT
Start: 2019-07-01 | End: 2019-07-08

## 2019-07-01 RX ORDER — KETOROLAC TROMETHAMINE 30 MG/ML
60 INJECTION, SOLUTION INTRAMUSCULAR; INTRAVENOUS ONCE
Status: COMPLETED | OUTPATIENT
Start: 2019-07-01 | End: 2019-07-01

## 2019-07-01 RX ORDER — IBUPROFEN 600 MG/1
600 TABLET ORAL EVERY 8 HOURS PRN
Qty: 30 TABLET | Refills: 0 | Status: SHIPPED | OUTPATIENT
Start: 2019-07-01 | End: 2019-07-08

## 2019-07-01 RX ORDER — CYCLOBENZAPRINE HCL 10 MG
10 TABLET ORAL ONCE
Status: COMPLETED | OUTPATIENT
Start: 2019-07-01 | End: 2019-07-01

## 2019-07-02 NOTE — ED PROVIDER NOTES
Patient Seen in: [facility] In [place]    History   Patient presents with:  Back Pain (musculoskeletal)    Stated Complaint: low back pain since waking up from a nap    HPI    21-year-old female who comes in today complaining of axial Years since quittin.6      Smokeless tobacco: Never Used    Alcohol use: No      Comment: not since pregnant    Drug use: No      Frequency: 1.0 times per week      Comment: not since pregnant, was using weed      Review of Systems    Positive for area take anti-inflammatory medication starting tomorrow, use muscle relaxants as needed if she begins to have any numbness tingling bowel or bladder incontinence she should be reevaluated otherwise follow-up with PCP.       The patient is in good condition

## 2019-07-17 ENCOUNTER — TELEPHONE (OUTPATIENT)
Dept: FAMILY MEDICINE CLINIC | Facility: CLINIC | Age: 34
End: 2019-07-17

## 2019-07-17 NOTE — TELEPHONE ENCOUNTER
I spoke with patient regarding NO SHOW status for office visit on7/17/19 with Crystal Lr. I informed patient our office has a $79.90 NO SHOW FEE POLICY. Patient will be charged $40.00 for any future NO SHOW appointments.  Patient verbalized understanding a

## 2019-07-24 DIAGNOSIS — I10 ESSENTIAL HYPERTENSION, BENIGN: ICD-10-CM

## 2019-07-24 RX ORDER — HYDROCHLOROTHIAZIDE 25 MG/1
TABLET ORAL
Qty: 30 TABLET | Refills: 0 | Status: SHIPPED | OUTPATIENT
Start: 2019-07-24 | End: 2019-11-20

## 2019-07-24 NOTE — TELEPHONE ENCOUNTER
Pt requesting refill of Hydrochlorothiazide , passed protocol , refill approved, sent to pharmacy:     Last Time Medication was Filled:  12/2018 by prior pcp    Last Office Visit with PCP: 7/17/2019      No future appointments.

## 2019-11-20 DIAGNOSIS — I10 ESSENTIAL HYPERTENSION, BENIGN: ICD-10-CM

## 2019-11-21 RX ORDER — HYDROCHLOROTHIAZIDE 25 MG/1
TABLET ORAL
Qty: 30 TABLET | Refills: 0 | Status: SHIPPED | OUTPATIENT
Start: 2019-11-21 | End: 2020-01-03 | Stop reason: ALTCHOICE

## 2019-11-21 NOTE — TELEPHONE ENCOUNTER
Requested Prescriptions     Pending Prescriptions Disp Refills   • HYDROCHLOROTHIAZIDE 25 MG Oral Tab [Pharmacy Med Name: HYDROCHLOROTHIAZIDE 25MG TABLETS] 30 tablet 0     Sig: TAKE 1 TABLET(25 MG) BY MOUTH DAILY       Pt requesting refill of HCTZ, passed

## 2019-11-29 ENCOUNTER — HOSPITAL ENCOUNTER (EMERGENCY)
Age: 34
Discharge: HOME | End: 2019-11-29
Attending: EMERGENCY MEDICINE
Payer: COMMERCIAL

## 2019-11-29 VITALS
TEMPERATURE: 98 F | OXYGEN SATURATION: 99 % | HEART RATE: 75 BPM | HEIGHT: 64 IN | DIASTOLIC BLOOD PRESSURE: 64 MMHG | BODY MASS INDEX: 47.8 KG/M2 | SYSTOLIC BLOOD PRESSURE: 145 MMHG | WEIGHT: 280 LBS | RESPIRATION RATE: 16 BRPM

## 2019-11-29 DIAGNOSIS — N76.0 ACUTE VAGINITIS: Primary | ICD-10-CM

## 2019-11-29 DIAGNOSIS — B37.3 YEAST VAGINITIS: ICD-10-CM

## 2019-11-29 PROCEDURE — 87660 TRICHOMONAS VAGIN DIR PROBE: CPT | Performed by: PHYSICIAN ASSISTANT

## 2019-11-29 PROCEDURE — 81025 URINE PREGNANCY TEST: CPT

## 2019-11-29 PROCEDURE — 87480 CANDIDA DNA DIR PROBE: CPT | Performed by: PHYSICIAN ASSISTANT

## 2019-11-29 PROCEDURE — 87510 GARDNER VAG DNA DIR PROBE: CPT | Performed by: PHYSICIAN ASSISTANT

## 2019-11-29 PROCEDURE — 99284 EMERGENCY DEPT VISIT MOD MDM: CPT

## 2019-11-29 PROCEDURE — 87491 CHLMYD TRACH DNA AMP PROBE: CPT | Performed by: PHYSICIAN ASSISTANT

## 2019-11-29 PROCEDURE — 87591 N.GONORRHOEAE DNA AMP PROB: CPT | Performed by: PHYSICIAN ASSISTANT

## 2019-11-29 RX ORDER — FLUCONAZOLE 150 MG/1
150 TABLET ORAL ONCE
Qty: 2 TABLET | Refills: 0 | Status: SHIPPED | OUTPATIENT
Start: 2019-11-29 | End: 2019-11-29

## 2019-11-30 NOTE — ED PROVIDER NOTES
Patient Seen in: [facility] In [place]      History   Patient presents with:  Clifton-KASSIE (genital)    Stated Complaint: clifton wiggins    28-year-old obese -American female with a history of hypertension presents to the ER today with compl [11/29/19 1856]   /64   Pulse 75   Resp 16   Temp 98.4 °F (36.9 °C)   Temp src    SpO2 99 %   O2 Device None (Room air)       Current:/64   Pulse 75   Temp 98.4 °F (36.9 °C)   Resp 16   Ht 162.6 cm (5' 4\")   Wt 127 kg   LMP 11/02/2019   SpO2 9 97171  [phone]      Follow up on Monday. Call for appointment.         Medications Prescribed:  Current Discharge Medication List    START taking these medications    fluconazole (DIFLUCAN) 150 MG Oral Tab  Take 1 tablet (150 mg total) by mouth once fo

## 2019-11-30 NOTE — ED NOTES
Vaginitis swab is negative for trichomonas however is positive for Candida the patient was given prescription for Diflucan in the emergency room. Patient discharged home at this time.

## 2019-12-04 RX ORDER — LIRAGLUTIDE 6 MG/ML
INJECTION, SOLUTION SUBCUTANEOUS
Refills: 2 | OUTPATIENT
Start: 2019-12-04

## 2019-12-04 NOTE — TELEPHONE ENCOUNTER
Requested Prescriptions     Pending Prescriptions Disp Refills   • SAXENDA 18 MG/3ML Subcutaneous Solution Pen-injector [Pharmacy Med Name: Brisa Choi 6MG/ML PEN INJ 3ML]  2     Sig: INJECT 0.6MG SUBCUTANEOUSLY DAILY FOR 1 WEEK, THEN INCREASE DOSE BY 0.6MG PE

## 2019-12-04 NOTE — TELEPHONE ENCOUNTER
Pt not seen since 6/26 and was told to f-u in 4-6 wks after starting this medication at that visit. Please have her schedule a f-u appt. Thanks.

## 2019-12-05 NOTE — TELEPHONE ENCOUNTER
LMOM to return call to the office. Provided pt office phone [phone] along with office hours given.    detailed message left to make an apt

## 2019-12-09 RX ORDER — LIRAGLUTIDE 6 MG/ML
INJECTION, SOLUTION SUBCUTANEOUS
Refills: 2 | OUTPATIENT
Start: 2019-12-09

## 2019-12-09 RX ORDER — LIRAGLUTIDE 6 MG/ML
INJECTION, SOLUTION SUBCUTANEOUS
Qty: 27 ML | Refills: 0 | OUTPATIENT
Start: 2019-12-09

## 2019-12-09 NOTE — TELEPHONE ENCOUNTER
Requested Prescriptions     Refused Prescriptions Disp Refills   • SAXENDA 18 MG/3ML Subcutaneous Solution Pen-injector [Pharmacy Med Name: SAXENDA 6MG/ML PEN INJ 3ML] 27 mL 0     Sig: INJECT 0.6MG SUBCUTANEOUSLY DAILY FOR 1 WEEK, THEN INCREASE DOSE BY 0.6

## 2020-01-03 ENCOUNTER — OFFICE VISIT (OUTPATIENT)
Dept: FAMILY MEDICINE CLINIC | Facility: CLINIC | Age: 35
End: 2020-01-03
Payer: COMMERCIAL

## 2020-01-03 ENCOUNTER — TELEPHONE (OUTPATIENT)
Dept: FAMILY MEDICINE CLINIC | Facility: CLINIC | Age: 35
End: 2020-01-03

## 2020-01-03 VITALS
HEART RATE: 83 BPM | BODY MASS INDEX: 48.88 KG/M2 | RESPIRATION RATE: 20 BRPM | TEMPERATURE: 98 F | WEIGHT: 286.31 LBS | SYSTOLIC BLOOD PRESSURE: 148 MMHG | OXYGEN SATURATION: 99 % | HEIGHT: 64 IN | DIASTOLIC BLOOD PRESSURE: 88 MMHG

## 2020-01-03 DIAGNOSIS — Z13.29 SCREENING FOR ENDOCRINE, NUTRITIONAL, METABOLIC AND IMMUNITY DISORDER: ICD-10-CM

## 2020-01-03 DIAGNOSIS — Z13.228 SCREENING FOR ENDOCRINE, NUTRITIONAL, METABOLIC AND IMMUNITY DISORDER: ICD-10-CM

## 2020-01-03 DIAGNOSIS — E66.01 CLASS 3 SEVERE OBESITY DUE TO EXCESS CALORIES WITHOUT SERIOUS COMORBIDITY WITH BODY MASS INDEX (BMI) OF 50.0 TO 59.9 IN ADULT (HCC): ICD-10-CM

## 2020-01-03 DIAGNOSIS — I10 ESSENTIAL HYPERTENSION, BENIGN: ICD-10-CM

## 2020-01-03 DIAGNOSIS — Z13.6 SCREENING FOR ISCHEMIC HEART DISEASE: ICD-10-CM

## 2020-01-03 DIAGNOSIS — Z28.21 INFLUENZA VACCINE REFUSED: ICD-10-CM

## 2020-01-03 DIAGNOSIS — Z00.00 ANNUAL PHYSICAL EXAM: Primary | ICD-10-CM

## 2020-01-03 DIAGNOSIS — Z13.21 SCREENING FOR ENDOCRINE, NUTRITIONAL, METABOLIC AND IMMUNITY DISORDER: ICD-10-CM

## 2020-01-03 DIAGNOSIS — Z13.0 SCREENING FOR ENDOCRINE, NUTRITIONAL, METABOLIC AND IMMUNITY DISORDER: ICD-10-CM

## 2020-01-03 DIAGNOSIS — Z13.0 SCREENING FOR IRON DEFICIENCY ANEMIA: ICD-10-CM

## 2020-01-03 PROCEDURE — 99214 OFFICE O/P EST MOD 30 MIN: CPT | Performed by: FAMILY MEDICINE

## 2020-01-03 RX ORDER — LOSARTAN POTASSIUM 50 MG/1
50 TABLET ORAL DAILY
Qty: 30 TABLET | Refills: 1 | Status: SHIPPED | OUTPATIENT
Start: 2020-01-03 | End: 2020-09-02

## 2020-01-03 RX ORDER — PEN NEEDLE, DIABETIC 30 GX3/16"
1 NEEDLE, DISPOSABLE MISCELLANEOUS DAILY
Qty: 90 EACH | Refills: 0 | Status: SHIPPED | OUTPATIENT
Start: 2020-01-03 | End: 2020-02-02

## 2020-01-03 RX ORDER — LIRAGLUTIDE 6 MG/ML
INJECTION, SOLUTION SUBCUTANEOUS
Refills: 1 | COMMUNITY
Start: 2019-12-03 | End: 2020-01-03

## 2020-01-03 RX ORDER — LIRAGLUTIDE 6 MG/ML
INJECTION, SOLUTION SUBCUTANEOUS
Refills: 1 | Status: CANCELLED | OUTPATIENT
Start: 2020-01-03

## 2020-01-03 NOTE — TELEPHONE ENCOUNTER
Pt called stating she is having issues with the weight loss medication prescribed, pt stated that the prescription was written for 34 days and it cannot be covered that way it needs to be for 30 days.   Pt is at the pharmacy waiting right now

## 2020-01-03 NOTE — PATIENT INSTRUCTIONS
As of October 6th 2014, the Drug Enforcement Agency [facility]) is reclassifying all hydrocodone combination medications from Schedule III to Schedule II. This includes medications such as Norco, Vicodin, Lortab, Zohydro, and Vicoprofen.      What this means for managing your health. A screening test is done to find possible disorders or diseases in people who don't have any symptoms.  The goal is to find a disease early so lifestyle changes can be made and you can be watched more closely to reduce the risk of dise Gonorrhea Sexually active women at increased risk for infection At routine exams   Hepatitis C Anyone at increased risk At routine exams   HIV All women should be tested at least once for HIV between the ages of 15 and 59 At routine exams.  Those with ris doses   Pneumococcal conjugate vaccine (PCV13) and pneumococcal polysaccharide vaccine (PPSV23) Women at increased risk for infection should talk with their healthcare provider PCV13: 1 dose ages 23 to 72 (protects against 13 types of pneumococcal bacteria

## 2020-01-03 NOTE — TELEPHONE ENCOUNTER
Faxed  RX prescription coupon to pharmacy   Per the pharmacy the coupon requires 15ml which is a full fill requirement for insurance coverage.  At the current dosing the medication cannot be covered

## 2020-01-03 NOTE — PROGRESS NOTES
Patient presents with:  Physical: not fasting      HPI:   Yolanda Smart is a 30year old female who presents for a complete physical without gyne exam.   Patient feels well, dental visit up to date, no hearing problem. Vaccinations up to date.   LMP and is requesting referral again. HTN: Pt has a h/o pre-eclampsia with her last 2 pregnancies. She had been on Methyldopa in the past. Post-partum she was tried on Lisinopril, but pt cannot recall whe that was stopped.   Most recently she was on HCTZ 25 10.00        Pack years: 1        Start date: 9/10/2005        Quit date: 10/25/2017        Years since quittin.1      Smokeless tobacco: Never Used    Alcohol use: No      Comment: not since pregnant    Drug use: Yes      Frequency: 1.0 times per week lesions, rashes or wounds. PSYCH: normal affect and mood. ASSESSMENT AND PLAN:     30year old female with     1. Annual physical exam  Routine labs ordered today, await results. Counseled pt on healthy lifestyle changes. Vaccines today: declined flu . was recommended low fat diet and aerobic exercise 30 minutes three times weekly. Health maintenance.    Osteoporosis prevention addressed  Recommended whole food type diet, eliminate processed food/low sugar and low sat fat diet      The patient indicates

## 2020-01-03 NOTE — TELEPHONE ENCOUNTER
Call placed to pharmacy for clarification. Insurance will not pay for less than 15 mL per 30 days. However, given the taper, patient is not at this quantity. Patient was attempting to use coupon, but it is not valid unless used with insurance.     Pharmacy

## 2020-01-03 NOTE — TELEPHONE ENCOUNTER
LMOM for patient to return call to the office. Provided pt office phone [phone] along with office hours given.     Routed to MD for review

## 2020-01-06 NOTE — TELEPHONE ENCOUNTER
New Rx sent in for Saxenda 3mL x 5 pens. Placed note in Rx stating that pt has voucher that requires 15 mL fill.

## 2020-01-06 NOTE — TELEPHONE ENCOUNTER
Spoke with pharmacy and coupon processed for new prescriptions    Patient notified  Patient verbalizes understanding.

## 2020-01-06 NOTE — TELEPHONE ENCOUNTER
Sent in Rx for 15 mL (3 mL pens x 5 with 2 refills). Unclear what pharmacy is requesting. Will discuss with Tayla Stiles to clarify.

## 2020-01-09 DIAGNOSIS — F32.A ANXIETY AND DEPRESSION: ICD-10-CM

## 2020-01-09 DIAGNOSIS — F41.9 ANXIETY AND DEPRESSION: ICD-10-CM

## 2020-01-10 RX ORDER — SERTRALINE HYDROCHLORIDE 25 MG/1
TABLET, FILM COATED ORAL
Qty: 90 TABLET | Refills: 0 | Status: SHIPPED | OUTPATIENT
Start: 2020-01-10 | End: 2020-04-28

## 2020-01-10 NOTE — TELEPHONE ENCOUNTER
Because we are restarting her Sertraline, she needs to f-u with me in 1 month after restarting. Please let pt know, thanks!

## 2020-01-10 NOTE — TELEPHONE ENCOUNTER
Pt requesting refill of   Requested Prescriptions     Pending Prescriptions Disp Refills   • Sertraline HCl 25 MG Oral Tab [Pharmacy Med Name: SERTRALINE 25MG TABLETS] 90 tablet 1     Sig: TAKE 1 TABLET BY MOUTH EVERY DAY     No protocol available, routed

## 2020-02-12 ENCOUNTER — TELEPHONE (OUTPATIENT)
Dept: FAMILY MEDICINE CLINIC | Facility: CLINIC | Age: 35
End: 2020-02-12

## 2020-02-12 NOTE — TELEPHONE ENCOUNTER
LMOM for patient regarding NO SHOW status for office visit on 02/12/2020 with Dr. Risa Deluca. I informed patient our office has a $87.10 NO SHOW FEE POLICY. Patient will be charged $40.00 for any future NO SHOW appointments.

## 2020-04-28 ENCOUNTER — APPOINTMENT (OUTPATIENT)
Dept: CT IMAGING | Age: 35
End: 2020-04-28
Attending: EMERGENCY MEDICINE
Payer: COMMERCIAL

## 2020-04-28 ENCOUNTER — HOSPITAL ENCOUNTER (EMERGENCY)
Age: 35
Discharge: HOME | End: 2020-04-28
Attending: EMERGENCY MEDICINE
Payer: COMMERCIAL

## 2020-04-28 VITALS
TEMPERATURE: 98 F | DIASTOLIC BLOOD PRESSURE: 73 MMHG | WEIGHT: 280 LBS | RESPIRATION RATE: 18 BRPM | HEIGHT: 64 IN | HEART RATE: 76 BPM | OXYGEN SATURATION: 100 % | SYSTOLIC BLOOD PRESSURE: 146 MMHG | BODY MASS INDEX: 47.8 KG/M2

## 2020-04-28 DIAGNOSIS — R10.9 ABDOMINAL PAIN, ACUTE: Primary | ICD-10-CM

## 2020-04-28 DIAGNOSIS — D64.9 ANEMIA, UNSPECIFIED TYPE: ICD-10-CM

## 2020-04-28 DIAGNOSIS — K62.5 RECTAL BLEEDING: ICD-10-CM

## 2020-04-28 PROCEDURE — 80053 COMPREHEN METABOLIC PANEL: CPT | Performed by: EMERGENCY MEDICINE

## 2020-04-28 PROCEDURE — 96360 HYDRATION IV INFUSION INIT: CPT

## 2020-04-28 PROCEDURE — 74177 CT ABD & PELVIS W/CONTRAST: CPT | Performed by: EMERGENCY MEDICINE

## 2020-04-28 PROCEDURE — 85025 COMPLETE CBC W/AUTO DIFF WBC: CPT | Performed by: EMERGENCY MEDICINE

## 2020-04-28 PROCEDURE — 99284 EMERGENCY DEPT VISIT MOD MDM: CPT

## 2020-04-28 PROCEDURE — 83690 ASSAY OF LIPASE: CPT | Performed by: EMERGENCY MEDICINE

## 2020-04-28 PROCEDURE — 81025 URINE PREGNANCY TEST: CPT

## 2020-04-28 PROCEDURE — 96361 HYDRATE IV INFUSION ADD-ON: CPT

## 2020-04-28 PROCEDURE — 81003 URINALYSIS AUTO W/O SCOPE: CPT | Performed by: EMERGENCY MEDICINE

## 2020-04-28 PROCEDURE — 82272 OCCULT BLD FECES 1-3 TESTS: CPT

## 2020-04-28 RX ORDER — SODIUM CHLORIDE 9 MG/ML
INJECTION, SOLUTION INTRAVENOUS CONTINUOUS
Status: DISCONTINUED | OUTPATIENT
Start: 2020-04-28 | End: 2020-04-28

## 2020-04-28 RX ORDER — PNV NO.95/FERROUS FUM/FOLIC AC 28MG-0.8MG
1 TABLET ORAL DAILY
Qty: 30 TABLET | Refills: 0 | Status: SHIPPED | OUTPATIENT
Start: 2020-04-28 | End: 2020-05-28

## 2020-04-28 RX ORDER — NICOTINE POLACRILEX 4 MG/1
20 GUM, CHEWING ORAL DAILY
Qty: 30 TABLET | Refills: 0 | Status: SHIPPED | OUTPATIENT
Start: 2020-04-28 | End: 2020-05-28

## 2020-04-28 NOTE — ED PROVIDER NOTES
Patient Seen in: [facility] In [place]      History   Patient presents with:  GI Bleeding  Abdomen/Flank Pain    Stated Complaint: BLOOD IN STOOL, ABD PAIN    HPI    30-year-old -American female complaining of rectal bleeding. HPI.  Constitutional and vital signs reviewed. All other systems reviewed and negative except as noted above.     Physical Exam     ED Triage Vitals   BP 04/28/20 0803 (!) 176/105   Pulse 04/28/20 0803 86   Resp 04/28/20 0803 18   Temp 04/28/20 0803 97 ---------                               -----------         ------                     CBC W/ DIFFERENTIAL[[id number]]          Abnormal            Final result                 Please view results for these tests on the individual orders.    URINALYSIS WIT

## 2020-04-29 ENCOUNTER — PATIENT OUTREACH (OUTPATIENT)
Dept: FAMILY MEDICINE CLINIC | Facility: CLINIC | Age: 35
End: 2020-04-29

## 2020-04-29 NOTE — PROGRESS NOTES
Called patient to make Telephone Visit F/U in regards to her ED visit on 04/28/2020, pt picked up and hung up.  Tried calling her 2 times

## 2020-06-02 ENCOUNTER — VIRTUAL PHONE E/M (OUTPATIENT)
Dept: FAMILY MEDICINE CLINIC | Facility: CLINIC | Age: 35
End: 2020-06-02
Payer: COMMERCIAL

## 2020-06-02 ENCOUNTER — LAB ENCOUNTER (OUTPATIENT)
Dept: LAB | Facility: HOSPITAL | Age: 35
End: 2020-06-02
Attending: FAMILY MEDICINE
Payer: COMMERCIAL

## 2020-06-02 DIAGNOSIS — B34.9 ACUTE VIRAL SYNDROME: ICD-10-CM

## 2020-06-02 DIAGNOSIS — B34.9 ACUTE VIRAL SYNDROME: Primary | ICD-10-CM

## 2020-06-02 PROBLEM — IMO0002 WOUND HEMATOMA: Status: RESOLVED | Noted: 2019-01-18 | Resolved: 2020-06-02

## 2020-06-02 PROBLEM — M54.9 BACKACHE: Status: RESOLVED | Noted: 2019-01-18 | Resolved: 2020-06-02

## 2020-06-02 PROBLEM — O26.859 SPOTTING AFFECTING PREGNANCY (HCC): Status: RESOLVED | Noted: 2018-04-17 | Resolved: 2020-06-02

## 2020-06-02 PROBLEM — R06.83 SNORING: Status: RESOLVED | Noted: 2019-01-18 | Resolved: 2020-06-02

## 2020-06-02 PROBLEM — N94.9 FEMALE GENITAL SYMPTOMS: Status: RESOLVED | Noted: 2019-01-18 | Resolved: 2020-06-02

## 2020-06-02 PROBLEM — R35.0 INCREASED FREQUENCY OF URINATION: Status: RESOLVED | Noted: 2019-01-18 | Resolved: 2020-06-02

## 2020-06-02 PROBLEM — R10.2 PAIN IN PELVIS: Status: RESOLVED | Noted: 2019-01-18 | Resolved: 2020-06-02

## 2020-06-02 PROBLEM — R10.9 ABDOMINAL PAIN OF UNKNOWN ETIOLOGY: Status: RESOLVED | Noted: 2017-01-24 | Resolved: 2020-06-02

## 2020-06-02 PROBLEM — D72.829 LEUKOCYTOSIS: Status: RESOLVED | Noted: 2018-05-21 | Resolved: 2020-06-02

## 2020-06-02 PROBLEM — O99.019 ANTEPARTUM ANEMIA (HCC): Status: RESOLVED | Noted: 2018-01-23 | Resolved: 2020-06-02

## 2020-06-02 PROBLEM — O99.019 ANTEPARTUM ANEMIA: Status: RESOLVED | Noted: 2018-01-23 | Resolved: 2020-06-02

## 2020-06-02 PROBLEM — N92.1 METRORRHAGIA: Status: RESOLVED | Noted: 2019-01-18 | Resolved: 2020-06-02

## 2020-06-02 PROBLEM — O26.859 SPOTTING AFFECTING PREGNANCY: Status: RESOLVED | Noted: 2018-04-17 | Resolved: 2020-06-02

## 2020-06-02 PROCEDURE — 99213 OFFICE O/P EST LOW 20 MIN: CPT | Performed by: FAMILY MEDICINE

## 2020-06-02 NOTE — PROGRESS NOTES
Virtual Telephone Check-In    Anna Unique verbally consents to a Virtual/Telephone Check-In visit on 06/02/20. Patient has been referred to the [facility] website at [email or web address] to review the yearly Consent to Treat document.     Patient un will call you for appt at drive-up facility.       Selena Palm MD

## 2020-06-02 NOTE — PATIENT INSTRUCTIONS
Coronavirus Disease 2019 (COVID-19)     [facility] [address] is committed to the safety and well-being of our patients, members, employees, and communities.  As concerns arise about the new strain of coronavirus that causes COVID-19, [place] [address] your household, like dishes, towels, and bedding   10. Clean all surfaces that are touched often, like counters, tabletops, and doorknobs. Use household cleaning sprays or wipes according to the label instructions.       Patients with confirmed COVID-19 brittni symptoms started, OR until 72 hours after your fever is gone and symptoms are getting better, whichever is longer.     Additional Information      You can also get more information at the following websites:   Centers for Disease Control & Prevention (CDC)

## 2020-07-15 ENCOUNTER — OFFICE VISIT (OUTPATIENT)
Dept: FAMILY MEDICINE CLINIC | Facility: CLINIC | Age: 35
End: 2020-07-15
Payer: COMMERCIAL

## 2020-07-15 ENCOUNTER — TELEPHONE (OUTPATIENT)
Dept: FAMILY MEDICINE CLINIC | Facility: CLINIC | Age: 35
End: 2020-07-15

## 2020-07-15 VITALS
DIASTOLIC BLOOD PRESSURE: 76 MMHG | HEIGHT: 64 IN | BODY MASS INDEX: 49.44 KG/M2 | OXYGEN SATURATION: 99 % | HEART RATE: 78 BPM | WEIGHT: 289.63 LBS | SYSTOLIC BLOOD PRESSURE: 128 MMHG | RESPIRATION RATE: 18 BRPM | TEMPERATURE: 97 F

## 2020-07-15 DIAGNOSIS — D17.23 LIPOMA OF BOTH LOWER EXTREMITIES: ICD-10-CM

## 2020-07-15 DIAGNOSIS — I10 ESSENTIAL HYPERTENSION, BENIGN: Primary | ICD-10-CM

## 2020-07-15 DIAGNOSIS — L30.9 ECZEMA, UNSPECIFIED TYPE: ICD-10-CM

## 2020-07-15 DIAGNOSIS — E66.01 CLASS 3 SEVERE OBESITY DUE TO EXCESS CALORIES WITHOUT SERIOUS COMORBIDITY WITH BODY MASS INDEX (BMI) OF 50.0 TO 59.9 IN ADULT (HCC): ICD-10-CM

## 2020-07-15 DIAGNOSIS — D17.24 LIPOMA OF BOTH LOWER EXTREMITIES: ICD-10-CM

## 2020-07-15 DIAGNOSIS — D50.0 IRON DEFICIENCY ANEMIA DUE TO CHRONIC BLOOD LOSS: ICD-10-CM

## 2020-07-15 PROBLEM — R73.9 HYPERGLYCEMIA: Status: RESOLVED | Noted: 2018-05-21 | Resolved: 2020-07-15

## 2020-07-15 PROBLEM — G47.00 INSOMNIA: Status: RESOLVED | Noted: 2019-01-18 | Resolved: 2020-07-15

## 2020-07-15 PROBLEM — T81.49XA ABDOMINAL WALL ABSCESS AT SITE OF SURGICAL WOUND: Status: RESOLVED | Noted: 2018-05-21 | Resolved: 2020-07-15

## 2020-07-15 PROCEDURE — 3008F BODY MASS INDEX DOCD: CPT | Performed by: FAMILY MEDICINE

## 2020-07-15 PROCEDURE — 3078F DIAST BP <80 MM HG: CPT | Performed by: FAMILY MEDICINE

## 2020-07-15 PROCEDURE — 99214 OFFICE O/P EST MOD 30 MIN: CPT | Performed by: FAMILY MEDICINE

## 2020-07-15 PROCEDURE — 3074F SYST BP LT 130 MM HG: CPT | Performed by: FAMILY MEDICINE

## 2020-07-15 RX ORDER — FERROUS SULFATE 325(65) MG
325 TABLET ORAL
Qty: 90 TABLET | Refills: 0 | Status: SHIPPED | OUTPATIENT
Start: 2020-07-15 | End: 2021-02-19 | Stop reason: ALTCHOICE

## 2020-07-15 RX ORDER — CLOTRIMAZOLE 1 %
1 CREAM (GRAM) TOPICAL 2 TIMES DAILY
COMMUNITY
Start: 2020-06-22 | End: 2021-10-11 | Stop reason: ALTCHOICE

## 2020-07-15 RX ORDER — TRIAMCINOLONE ACETONIDE 0.25 MG/G
1 OINTMENT TOPICAL 2 TIMES DAILY
Qty: 1 TUBE | Refills: 1 | Status: SHIPPED | OUTPATIENT
Start: 2020-07-15 | End: 2020-08-05 | Stop reason: ALTCHOICE

## 2020-07-15 NOTE — PATIENT INSTRUCTIONS
Iron-Deficiency Anemia (Adult)  Red blood cells carry oxygen to the tissues of your body. Anemia is a condition in which you have too few red blood cells. You need iron to make red cells. Anemia makes you feel tired and run down.  When anemia becomes oral Memo last reviewed this educational content on 3/1/2018  © 5617-8659 [employer]. [address] [facility], [facility] [place]. All rights reserved. This information is not intended as a substitute for professional medical care.  Always follo

## 2020-07-15 NOTE — TELEPHONE ENCOUNTER
Pt requesting refill of Saxenda     No protocol available, routed to provider to review medication request:     Last Time Medication was Filled:  1/6/2020    Last Office Visit with Provider: 6/2/2020- virtual- covid  1/3/2020- annual physical   Due 1/24/20

## 2020-07-15 NOTE — PROGRESS NOTES
CHIEF COMPLAINT: Patient presents with: Follow - Up: hypertension, and weight follow up         HPI:     Ravindra Feliciano is a 30year old female presents for medication f-u.     Obesity f-u: Pt ran out of Saxenda early on during the pandemic and gaine diuretic and has to urinate frequently. She has no HA, CP, palpitations, dizziness, or leg swelling. Skin problem: She has noticed some dry skin patches all over her body- on her chest, legs, back, and buttocks.  She has been using Clotrimazole cream wi Types: Cannabis      Comment: not since pregnant, was using weed       Medications (Active prior to today's visit):  Current Outpatient Medications   Medication Sig Dispense Refill   • clotrimazole 1 % External Cream 1 Application 2 (two) times daily.  appl Constitutional: She is oriented to person, place, and time and obese. She appears well-developed and well-nourished. HENT:   Head: Normocephalic. Eyes: Conjunctivae are normal.   Neck: Normal range of motion. Neck supple.    Cardiovascular: Normal rat amenable to referral to the Weight Loss Clinic.     - Liraglutide -Weight Management (SAXENDA) 18 MG/3ML Subcutaneous Solution Pen-injector; Inject 3 mg into the skin daily. Dispense: 5 pen; Refill: 1  - OP REFERRAL TO WEIGHT LOSS CLINIC    4.  Lipoma of b Chalino Garcia MD      Patient understands plan and follow-up. Return in about 3 months (around 10/15/2020) for anemia f-u, weight loss f-u.

## 2020-08-01 ENCOUNTER — APPOINTMENT (OUTPATIENT)
Dept: ULTRASOUND IMAGING | Age: 35
End: 2020-08-01
Attending: EMERGENCY MEDICINE
Payer: COMMERCIAL

## 2020-08-01 ENCOUNTER — HOSPITAL ENCOUNTER (EMERGENCY)
Age: 35
Discharge: HOME | End: 2020-08-01
Attending: EMERGENCY MEDICINE
Payer: COMMERCIAL

## 2020-08-01 VITALS
BODY MASS INDEX: 50 KG/M2 | TEMPERATURE: 98 F | RESPIRATION RATE: 16 BRPM | DIASTOLIC BLOOD PRESSURE: 102 MMHG | OXYGEN SATURATION: 100 % | WEIGHT: 288.81 LBS | SYSTOLIC BLOOD PRESSURE: 180 MMHG | HEART RATE: 81 BPM

## 2020-08-01 DIAGNOSIS — N93.8 DYSFUNCTIONAL UTERINE BLEEDING: Primary | ICD-10-CM

## 2020-08-01 LAB
ANION GAP SERPL CALC-SCNC: 5 MMOL/L (ref 0–18)
APTT PPP: 32.3 SECONDS (ref 25.4–36.1)
BASOPHILS # BLD AUTO: 0.05 X10(3) UL (ref 0–0.2)
BASOPHILS NFR BLD AUTO: 0.4 %
BUN BLD-MCNC: 15 MG/DL (ref 7–18)
BUN/CREAT SERPL: 13 (ref 10–20)
CALCIUM BLD-MCNC: 8.8 MG/DL (ref 8.5–10.1)
CHLORIDE SERPL-SCNC: 109 MMOL/L (ref 98–112)
CO2 SERPL-SCNC: 26 MMOL/L (ref 21–32)
CREAT BLD-MCNC: 1.15 MG/DL (ref 0.55–1.02)
DEPRECATED RDW RBC AUTO: 46.8 FL (ref 35.1–46.3)
EOSINOPHIL # BLD AUTO: 0.2 X10(3) UL (ref 0–0.7)
EOSINOPHIL NFR BLD AUTO: 1.6 %
ERYTHROCYTE [DISTWIDTH] IN BLOOD BY AUTOMATED COUNT: 19.9 % (ref 11–15)
GLUCOSE BLD-MCNC: 113 MG/DL (ref 70–99)
HCT VFR BLD AUTO: 33.5 % (ref 35–48)
HGB BLD-MCNC: 9.5 G/DL (ref 12–16)
IMM GRANULOCYTES # BLD AUTO: 0.03 X10(3) UL (ref 0–1)
IMM GRANULOCYTES NFR BLD: 0.2 %
INR BLD: 1 (ref 0.88–1.11)
LYMPHOCYTES # BLD AUTO: 3.58 X10(3) UL (ref 1–4)
LYMPHOCYTES NFR BLD AUTO: 28 %
MCH RBC QN AUTO: 19.2 PG (ref 26–34)
MCHC RBC AUTO-ENTMCNC: 28.4 G/DL (ref 31–37)
MCV RBC AUTO: 67.8 FL (ref 80–100)
MONOCYTES # BLD AUTO: 1.01 X10(3) UL (ref 0.1–1)
MONOCYTES NFR BLD AUTO: 7.9 %
NEUTROPHILS # BLD AUTO: 7.9 X10 (3) UL (ref 1.5–7.7)
NEUTROPHILS # BLD AUTO: 7.9 X10(3) UL (ref 1.5–7.7)
NEUTROPHILS NFR BLD AUTO: 61.9 %
OSMOLALITY SERPL CALC.SUM OF ELEC: 292 MOSM/KG (ref 275–295)
PLATELET # BLD AUTO: 362 10(3)UL (ref 150–450)
POCT LOT NUMBER: NORMAL
POCT URINE PREGNANCY: NEGATIVE
POTASSIUM SERPL-SCNC: 3.3 MMOL/L (ref 3.5–5.1)
PSA SERPL DL<=0.01 NG/ML-MCNC: 13.1 SECONDS (ref 12–14.3)
RBC # BLD AUTO: 4.94 X10(6)UL (ref 3.8–5.3)
SODIUM SERPL-SCNC: 140 MMOL/L (ref 136–145)
WBC # BLD AUTO: 12.8 X10(3) UL (ref 4–11)

## 2020-08-01 PROCEDURE — 96361 HYDRATE IV INFUSION ADD-ON: CPT

## 2020-08-01 PROCEDURE — 87591 N.GONORRHOEAE DNA AMP PROB: CPT | Performed by: EMERGENCY MEDICINE

## 2020-08-01 PROCEDURE — 80048 BASIC METABOLIC PNL TOTAL CA: CPT | Performed by: EMERGENCY MEDICINE

## 2020-08-01 PROCEDURE — 96360 HYDRATION IV INFUSION INIT: CPT

## 2020-08-01 PROCEDURE — 87510 GARDNER VAG DNA DIR PROBE: CPT | Performed by: EMERGENCY MEDICINE

## 2020-08-01 PROCEDURE — 87480 CANDIDA DNA DIR PROBE: CPT | Performed by: EMERGENCY MEDICINE

## 2020-08-01 PROCEDURE — 76830 TRANSVAGINAL US NON-OB: CPT | Performed by: EMERGENCY MEDICINE

## 2020-08-01 PROCEDURE — 81025 URINE PREGNANCY TEST: CPT

## 2020-08-01 PROCEDURE — 93975 VASCULAR STUDY: CPT | Performed by: EMERGENCY MEDICINE

## 2020-08-01 PROCEDURE — 87491 CHLMYD TRACH DNA AMP PROBE: CPT | Performed by: EMERGENCY MEDICINE

## 2020-08-01 PROCEDURE — 85730 THROMBOPLASTIN TIME PARTIAL: CPT | Performed by: EMERGENCY MEDICINE

## 2020-08-01 PROCEDURE — 99285 EMERGENCY DEPT VISIT HI MDM: CPT

## 2020-08-01 PROCEDURE — 76856 US EXAM PELVIC COMPLETE: CPT | Performed by: EMERGENCY MEDICINE

## 2020-08-01 PROCEDURE — 99284 EMERGENCY DEPT VISIT MOD MDM: CPT

## 2020-08-01 PROCEDURE — 85610 PROTHROMBIN TIME: CPT | Performed by: EMERGENCY MEDICINE

## 2020-08-01 PROCEDURE — 85025 COMPLETE CBC W/AUTO DIFF WBC: CPT | Performed by: EMERGENCY MEDICINE

## 2020-08-01 PROCEDURE — 87660 TRICHOMONAS VAGIN DIR PROBE: CPT | Performed by: EMERGENCY MEDICINE

## 2020-08-01 NOTE — ED INITIAL ASSESSMENT (HPI)
Pt here for abnormal vagina bleeding since yesterday. Pt states she had her last normal period 7/11-18 then began bleeding yesterday with spotting and occasional quarter sized blood clots with cramping.

## 2020-08-01 NOTE — ED PROVIDER NOTES
Patient Seen in: [facility] In [place]      History   Patient presents with:  Eval-G    Stated Complaint: menstrual cycle 11-18, patient started to bleed again yesterday, large clots     HPI    28-year-old presents today with vaginal [08/01/20 1617]   BP (!) 185/98   Pulse 91   Resp 20   Temp 98.2 °F (36.8 °C)   Temp src Temporal   SpO2 100 %   O2 Device None (Room air)       Current:BP (!) 185/98   Pulse 91   Temp 98.2 °F (36.8 °C) (Temporal)   Resp 20   Wt 131 kg   LMP 07/31/2020   S result Positive for Candida species (*)     All other components within normal limits   CBC W/ DIFFERENTIAL - Abnormal; Notable for the following components:    WBC 12.8 (*)     HGB 9.5 (*)     HCT 33.5 (*)     MCV 67.8 (*)     MCH 19.2 (*)     MCHC 28.4 ( Right Ovary:                         4.26 cm x 2.52 cm x 2.40 cm Left Ovary:                           2.65 cm x 3.30 cm x 1.86 cm  FINDINGS:  PELVIS  UTERUS:  Unremarkable appearance.  RIGHT OVARY:  Simple cyst versus follicle of the right ovary measuring in 2 days            Medications Prescribed:  Current Discharge Medication List

## 2020-08-02 LAB
C TRACH DNA SPEC QL NAA+PROBE: NEGATIVE
N GONORRHOEA DNA SPEC QL NAA+PROBE: NEGATIVE

## 2020-08-04 ENCOUNTER — TELEPHONE (OUTPATIENT)
Dept: OBGYN CLINIC | Facility: CLINIC | Age: 35
End: 2020-08-04

## 2020-08-05 ENCOUNTER — OFFICE VISIT (OUTPATIENT)
Dept: FAMILY MEDICINE CLINIC | Facility: CLINIC | Age: 35
End: 2020-08-05
Payer: COMMERCIAL

## 2020-08-05 VITALS
HEIGHT: 64 IN | RESPIRATION RATE: 18 BRPM | OXYGEN SATURATION: 100 % | TEMPERATURE: 97 F | HEART RATE: 77 BPM | BODY MASS INDEX: 48.42 KG/M2 | DIASTOLIC BLOOD PRESSURE: 80 MMHG | SYSTOLIC BLOOD PRESSURE: 128 MMHG | WEIGHT: 283.63 LBS

## 2020-08-05 DIAGNOSIS — D50.0 IRON DEFICIENCY ANEMIA DUE TO CHRONIC BLOOD LOSS: ICD-10-CM

## 2020-08-05 DIAGNOSIS — B96.89 BACTERIAL VAGINOSIS: ICD-10-CM

## 2020-08-05 DIAGNOSIS — E87.6 HYPOKALEMIA: ICD-10-CM

## 2020-08-05 DIAGNOSIS — N92.0 MENORRHAGIA WITH REGULAR CYCLE: ICD-10-CM

## 2020-08-05 DIAGNOSIS — R79.89 ELEVATED SERUM CREATININE: ICD-10-CM

## 2020-08-05 DIAGNOSIS — B37.3 CANDIDAL VAGINITIS: Primary | ICD-10-CM

## 2020-08-05 DIAGNOSIS — N76.0 BACTERIAL VAGINOSIS: ICD-10-CM

## 2020-08-05 PROCEDURE — 3074F SYST BP LT 130 MM HG: CPT | Performed by: FAMILY MEDICINE

## 2020-08-05 PROCEDURE — 3079F DIAST BP 80-89 MM HG: CPT | Performed by: FAMILY MEDICINE

## 2020-08-05 PROCEDURE — 99214 OFFICE O/P EST MOD 30 MIN: CPT | Performed by: FAMILY MEDICINE

## 2020-08-05 PROCEDURE — 3008F BODY MASS INDEX DOCD: CPT | Performed by: FAMILY MEDICINE

## 2020-08-05 RX ORDER — FLUCONAZOLE 150 MG/1
150 TABLET ORAL ONCE
Qty: 1 TABLET | Refills: 0 | Status: SHIPPED | OUTPATIENT
Start: 2020-08-05 | End: 2020-08-05

## 2020-08-05 RX ORDER — METRONIDAZOLE 7.5 MG/G
1 GEL VAGINAL NIGHTLY
Qty: 1 TUBE | Refills: 0 | Status: SHIPPED | OUTPATIENT
Start: 2020-08-05 | End: 2020-08-10

## 2020-08-05 RX ORDER — ACETAMINOPHEN AND CODEINE PHOSPHATE 120; 12 MG/5ML; MG/5ML
0.35 SOLUTION ORAL DAILY
Qty: 3 PACKAGE | Refills: 3 | Status: SHIPPED | OUTPATIENT
Start: 2020-08-05 | End: 2021-02-19

## 2020-08-05 NOTE — PROGRESS NOTES
CHIEF COMPLAINT: Patient presents with:  Er F/u: THE [facility] ER f/u for Uterine Bleeding         HPI:     Fernando Ye is a 30year old female presents for ED f-u.    ED f-u: Pt went to the ED on 8/1/20 for heavy menstrual bleeding.  LMP was 7/11/20, but Smokeless tobacco: Never Used    Alcohol use: No      Comment: not since pregnant    Drug use: Yes      Frequency: 1.0 times per week      Types: Cannabis      Comment: not since pregnant, was using weed       Medications (Active prior to today's visit): Ht 64\"   Wt 283 lb 9.6 oz (128.6 kg)   LMP 07/31/2020 (Exact Date)   SpO2 100%   BMI 48.68 kg/m²   Vital signs reviewed. Appears stated age, well groomed. Physical Exam   Vitals reviewed. Constitutional: She is oriented to person, place, and time.  She a • PLT 08/01/2020 362.0    • MCV 08/01/2020 67.8*   • MCH 08/01/2020 19.2*   • MCHC 08/01/2020 28.4*   • RDW 08/01/2020 19.9*   • RDW-SD 08/01/2020 46.8*   • Neutrophil Absolute Prel* 08/01/2020 7.90*   • Neutrophil Absolute 08/01/2020 7.90*   • Lymphocyt for menorrhagia with pt  - she is amenable to start Norethindrone OCPs, R/B/SEs of new med d/w pt  - pt had NEGATIVE pregnancy test in ED on 8/1/20  - f-u in 3 months, or sooner prn    - Norethindrone 0.35 MG Oral Tab;  Take 1 tablet (0.35 mg total) by mout

## 2020-08-05 NOTE — PATIENT INSTRUCTIONS
Iron-Deficiency Anemia (Adult)  Red blood cells carry oxygen to the tissues of your body. Anemia is a condition in which you have too few red blood cells. You need iron to make red cells. Anemia makes you feel tired and run down.  When anemia becomes oral Memo last reviewed this educational content on 3/1/2018  © 0688-0283 [employer] [address]. [address] [facility], [facility] [place]. All rights reserved. This information is not intended as a substitute for professional medical care.  Always follo

## 2020-08-21 ENCOUNTER — TELEPHONE (OUTPATIENT)
Dept: FAMILY MEDICINE CLINIC | Facility: CLINIC | Age: 35
End: 2020-08-21

## 2020-08-21 NOTE — TELEPHONE ENCOUNTER
Patient started Norethindrone 0.35 MG Oral Tab on 8/5/2020 for heavy menstrual bleeding. By end of first week on pills, bleeding had ceased. Bleeding resumed 8/19/2020.  Patient complains of low back and uterine cramping, and very heavy flow, noticing cl

## 2020-08-21 NOTE — TELEPHONE ENCOUNTER
She can continue the pills, if she is willing to tolerate it for now. I reviewed side effects with her at time of the visit- she may have irregular bleeding pattern for the first 3-6 months that she is on this medication.  After that, then the heavy bleedin

## 2020-09-02 DIAGNOSIS — I10 ESSENTIAL HYPERTENSION, BENIGN: ICD-10-CM

## 2020-09-02 RX ORDER — FLUCONAZOLE 150 MG/1
TABLET ORAL
COMMUNITY
Start: 2020-08-05 | End: 2021-02-19 | Stop reason: ALTCHOICE

## 2020-09-02 RX ORDER — LOSARTAN POTASSIUM 50 MG/1
TABLET ORAL
Qty: 90 TABLET | Refills: 0 | Status: SHIPPED | OUTPATIENT
Start: 2020-09-02 | End: 2021-02-19

## 2020-09-02 NOTE — TELEPHONE ENCOUNTER
Pt requesting refill of   Requested Prescriptions     Pending Prescriptions Disp Refills   • LOSARTAN POTASSIUM 50 MG Oral Tab [Pharmacy Med Name: LOSARTAN 50MG TABLETS] 30 tablet 1     Sig: TAKE 1 TABLET(50 MG) BY MOUTH DAILY     Passed protocol, refill

## 2021-02-04 DIAGNOSIS — E66.01 CLASS 3 SEVERE OBESITY DUE TO EXCESS CALORIES WITHOUT SERIOUS COMORBIDITY WITH BODY MASS INDEX (BMI) OF 50.0 TO 59.9 IN ADULT (HCC): ICD-10-CM

## 2021-02-05 NOTE — TELEPHONE ENCOUNTER
Pt requesting refill of   Requested Prescriptions     Pending Prescriptions Disp Refills   • Liraglutide -Weight Management (SAXENDA) 18 MG/3ML Subcutaneous Solution Pen-injector 5 pen 1     Sig: Inject 3 mg into the skin daily.         No protocol availabl

## 2021-02-05 NOTE — TELEPHONE ENCOUNTER
Called pt to schedule appt. She was about to set up an appt for next week but call dropped. Several attempts to call her right back unsuccessful.

## 2021-02-08 RX ORDER — LIRAGLUTIDE 6 MG/ML
3 INJECTION, SOLUTION SUBCUTANEOUS DAILY
Qty: 5 PEN | Refills: 1 | OUTPATIENT
Start: 2021-02-08

## 2021-02-08 NOTE — TELEPHONE ENCOUNTER
Apt schedule 2/19/21 with labs on 02/10/ 21. Please advise on refill. Pended. Unlisted procedure code-Howard-10/11/2017   Received: Today   Message Contents   Linsey PURCELL [facility] Msg Pool             Unlisted procedure code

## 2021-02-08 NOTE — TELEPHONE ENCOUNTER
[facility], will fill medication at her next appt, since I haven't seen her for this in >6 months. Thanks.

## 2021-02-08 NOTE — TELEPHONE ENCOUNTER
Not seen for obesity/wt loss mgmt since July 2020, needs appt before I can refill. Please try to jeffrey her again to schedule a f-u appt. Thanks!

## 2021-02-09 RX ORDER — LIRAGLUTIDE 6 MG/ML
3 INJECTION, SOLUTION SUBCUTANEOUS DAILY
Qty: 5 PEN | Refills: 1 | OUTPATIENT
Start: 2021-02-09

## 2021-02-10 ENCOUNTER — NURSE ONLY (OUTPATIENT)
Dept: FAMILY MEDICINE CLINIC | Facility: CLINIC | Age: 36
End: 2021-02-10
Payer: COMMERCIAL

## 2021-02-10 DIAGNOSIS — D50.0 IRON DEFICIENCY ANEMIA DUE TO CHRONIC BLOOD LOSS: ICD-10-CM

## 2021-02-10 DIAGNOSIS — E87.6 HYPOKALEMIA: ICD-10-CM

## 2021-02-10 DIAGNOSIS — R79.89 ELEVATED SERUM CREATININE: ICD-10-CM

## 2021-02-10 LAB
ANION GAP SERPL CALC-SCNC: 6 MMOL/L (ref 0–18)
BASOPHILS # BLD AUTO: 0.07 X10(3) UL (ref 0–0.2)
BASOPHILS NFR BLD AUTO: 0.6 %
BUN BLD-MCNC: 10 MG/DL (ref 7–18)
BUN/CREAT SERPL: 9.3 (ref 10–20)
CALCIUM BLD-MCNC: 9.1 MG/DL (ref 8.5–10.1)
CHLORIDE SERPL-SCNC: 108 MMOL/L (ref 98–112)
CO2 SERPL-SCNC: 25 MMOL/L (ref 21–32)
CREAT BLD-MCNC: 1.07 MG/DL
DEPRECATED HBV CORE AB SER IA-ACNC: 3.8 NG/ML
DEPRECATED RDW RBC AUTO: 41.9 FL (ref 35.1–46.3)
EOSINOPHIL # BLD AUTO: 0.2 X10(3) UL (ref 0–0.7)
EOSINOPHIL NFR BLD AUTO: 1.6 %
ERYTHROCYTE [DISTWIDTH] IN BLOOD BY AUTOMATED COUNT: 18.6 % (ref 11–15)
GLUCOSE BLD-MCNC: 89 MG/DL (ref 70–99)
HCT VFR BLD AUTO: 31.4 %
HGB BLD-MCNC: 9 G/DL
IMM GRANULOCYTES # BLD AUTO: 0.05 X10(3) UL (ref 0–1)
IMM GRANULOCYTES NFR BLD: 0.4 %
IRON SATURATION: 3 %
IRON SERPL-MCNC: 16 UG/DL
LYMPHOCYTES # BLD AUTO: 3.68 X10(3) UL (ref 1–4)
LYMPHOCYTES NFR BLD AUTO: 29.6 %
MCH RBC QN AUTO: 18.7 PG (ref 26–34)
MCHC RBC AUTO-ENTMCNC: 28.7 G/DL (ref 31–37)
MCV RBC AUTO: 65.1 FL
MONOCYTES # BLD AUTO: 1.02 X10(3) UL (ref 0.1–1)
MONOCYTES NFR BLD AUTO: 8.2 %
NEUTROPHILS # BLD AUTO: 7.43 X10 (3) UL (ref 1.5–7.7)
NEUTROPHILS # BLD AUTO: 7.43 X10(3) UL (ref 1.5–7.7)
NEUTROPHILS NFR BLD AUTO: 59.6 %
OSMOLALITY SERPL CALC.SUM OF ELEC: 287 MOSM/KG (ref 275–295)
PATIENT FASTING Y/N/NP: YES
PLATELET # BLD AUTO: 355 10(3)UL (ref 150–450)
POTASSIUM SERPL-SCNC: 3.7 MMOL/L (ref 3.5–5.1)
RBC # BLD AUTO: 4.82 X10(6)UL
SODIUM SERPL-SCNC: 139 MMOL/L (ref 136–145)
TOTAL IRON BINDING CAPACITY: 486 UG/DL (ref 240–450)
TRANSFERRIN SERPL-MCNC: 326 MG/DL (ref 200–360)
WBC # BLD AUTO: 12.5 X10(3) UL (ref 4–11)

## 2021-02-10 PROCEDURE — 85025 COMPLETE CBC W/AUTO DIFF WBC: CPT | Performed by: FAMILY MEDICINE

## 2021-02-10 PROCEDURE — 82728 ASSAY OF FERRITIN: CPT | Performed by: FAMILY MEDICINE

## 2021-02-10 PROCEDURE — 83540 ASSAY OF IRON: CPT | Performed by: FAMILY MEDICINE

## 2021-02-10 PROCEDURE — 83550 IRON BINDING TEST: CPT | Performed by: FAMILY MEDICINE

## 2021-02-10 PROCEDURE — 80048 BASIC METABOLIC PNL TOTAL CA: CPT | Performed by: FAMILY MEDICINE

## 2021-02-10 NOTE — PROGRESS NOTES
Patient came in for draw of ordered fasting labs. Patient drawn out of right AC AC, x 1 attempt and tolerated well. 1 lt grn and 1 lav tube drawn.

## 2021-02-11 NOTE — PROGRESS NOTES
Pt has upcoming appt on 2/19:  - Hgb still low at 9.0 with low MCV, Iron, high TIBC, and low Ferritin--> is she still taking ferrous sulfate and minipill to control menorrhagia?   If not, needs to see Hematology for IV iron  - Cr is improved from previous

## 2021-02-19 ENCOUNTER — OFFICE VISIT (OUTPATIENT)
Dept: FAMILY MEDICINE CLINIC | Facility: CLINIC | Age: 36
End: 2021-02-19
Payer: COMMERCIAL

## 2021-02-19 VITALS
OXYGEN SATURATION: 100 % | WEIGHT: 271 LBS | HEIGHT: 64 IN | TEMPERATURE: 97 F | SYSTOLIC BLOOD PRESSURE: 168 MMHG | RESPIRATION RATE: 18 BRPM | DIASTOLIC BLOOD PRESSURE: 110 MMHG | HEART RATE: 68 BPM | BODY MASS INDEX: 46.26 KG/M2

## 2021-02-19 DIAGNOSIS — D50.0 IRON DEFICIENCY ANEMIA DUE TO CHRONIC BLOOD LOSS: Primary | ICD-10-CM

## 2021-02-19 DIAGNOSIS — E66.01 CLASS 3 SEVERE OBESITY DUE TO EXCESS CALORIES WITHOUT SERIOUS COMORBIDITY WITH BODY MASS INDEX (BMI) OF 50.0 TO 59.9 IN ADULT (HCC): ICD-10-CM

## 2021-02-19 DIAGNOSIS — I10 ESSENTIAL HYPERTENSION, BENIGN: ICD-10-CM

## 2021-02-19 PROBLEM — D50.9 IRON DEFICIENCY ANEMIA: Status: ACTIVE | Noted: 2021-02-19

## 2021-02-19 PROCEDURE — 3077F SYST BP >= 140 MM HG: CPT | Performed by: FAMILY MEDICINE

## 2021-02-19 PROCEDURE — 3008F BODY MASS INDEX DOCD: CPT | Performed by: FAMILY MEDICINE

## 2021-02-19 PROCEDURE — 3080F DIAST BP >= 90 MM HG: CPT | Performed by: FAMILY MEDICINE

## 2021-02-19 PROCEDURE — 99214 OFFICE O/P EST MOD 30 MIN: CPT | Performed by: FAMILY MEDICINE

## 2021-02-19 RX ORDER — LOSARTAN POTASSIUM 50 MG/1
50 TABLET ORAL DAILY
Qty: 90 TABLET | Refills: 1 | Status: SHIPPED | OUTPATIENT
Start: 2021-02-19

## 2021-02-19 RX ORDER — LIRAGLUTIDE 6 MG/ML
3 INJECTION, SOLUTION SUBCUTANEOUS DAILY
Qty: 5 PEN | Refills: 1 | Status: SHIPPED | OUTPATIENT
Start: 2021-02-19

## 2021-02-19 NOTE — PROGRESS NOTES
CHIEF COMPLAINT: Patient presents with:  Lab Results  Medication Request: saxenda        HPI:     Selena Palm is a 37year old female presents for HTN, anemia, and obesity f-u.     Anemia f-u: Pt was at the ED in 8/2020 for heavy menstrual bleeding and eat healthier together, so she has increased her fresh fruit and vegetable intake and cut out junk foods. Previous HPI from 7/2020: Pt ran out of  early on during the pandemic and gained weight.  She was busy with working from home and homesch ONLY     • TUBAL LIGATION        Family History   Problem Relation Age of Onset   • Cancer Father    • Diabetes Father    • Hypertension Mother       Social History: Social History    Tobacco Use      Smoking status: Former Smoker        Packs/day: 0.10 kg/m²   Vital signs reviewed. Appears stated age, well groomed. Physical Exam   Vitals reviewed. Constitutional: She is oriented to person, place, and time and obese. She appears well-developed and well-nourished. No distress.    HENT:   Head: Normocephal Eosinophil Absolute 02/10/2021 0.20    • Basophil Absolute 02/10/2021 0.07    • Immature Granulocyte Abs* 02/10/2021 0.05    • Neutrophil % 02/10/2021 59.6    • Lymphocyte % 02/10/2021 29.6    • Monocyte % 02/10/2021 8.2    • Eosinophil % 02/10/2021 1.6 Prescriptions Disp Refills   • Liraglutide -Weight Management (SAXENDA) 18 MG/3ML Subcutaneous Solution Pen-injector 5 pen 1     Sig: Inject 3 mg into the skin daily.    • losartan Potassium 50 MG Oral Tab 90 tablet 1     Sig: Take 1 tablet (50 mg total) by

## 2021-02-19 NOTE — PATIENT INSTRUCTIONS
Iron-Deficiency Anemia (Adult)  Red blood cells carry oxygen to the tissues of your body. Anemia is a condition in which you have too few red blood cells. You need iron to make red cells. Anemia makes you feel tired and run down.  When anemia becomes oral educational content on 3/1/2018  © 2068-5826 [employer] [address]. All rights reserved. This information is not intended as a substitute for professional medical care. Always follow your healthcare professional's instructions.

## 2021-03-02 ENCOUNTER — TELEPHONE (OUTPATIENT)
Dept: HEMATOLOGY/ONCOLOGY | Facility: HOSPITAL | Age: 36
End: 2021-03-02

## 2021-03-02 NOTE — TELEPHONE ENCOUNTER
Pedro Atkinson was referred to [facility] by Dr Kelley Hewitt for Iron deficiency anemia due to chronic blood loss. She is scheduled for the next available in [place] for 3/19. Is there a sooner availability you would like to see this patient?

## 2021-03-06 PROBLEM — D50.8 IRON DEFICIENCY ANEMIA REFRACTORY TO IRON THERAPY: Status: ACTIVE | Noted: 2021-03-06

## 2021-03-06 PROBLEM — D50.0 IRON DEFICIENCY ANEMIA SECONDARY TO BLOOD LOSS (CHRONIC): Status: ACTIVE | Noted: 2021-03-06

## 2021-03-12 ENCOUNTER — OFFICE VISIT (OUTPATIENT)
Dept: HEMATOLOGY/ONCOLOGY | Facility: HOSPITAL | Age: 36
End: 2021-03-12
Attending: INTERNAL MEDICINE
Payer: COMMERCIAL

## 2021-03-19 ENCOUNTER — APPOINTMENT (OUTPATIENT)
Dept: HEMATOLOGY/ONCOLOGY | Facility: HOSPITAL | Age: 36
End: 2021-03-19
Attending: SPECIALIST
Payer: COMMERCIAL

## 2021-06-22 ENCOUNTER — APPOINTMENT (OUTPATIENT)
Dept: GENERAL RADIOLOGY | Facility: HOSPITAL | Age: 36
End: 2021-06-22
Attending: EMERGENCY MEDICINE
Payer: COMMERCIAL

## 2021-06-22 ENCOUNTER — HOSPITAL ENCOUNTER (EMERGENCY)
Facility: HOSPITAL | Age: 36
Discharge: HOME | End: 2021-06-22
Attending: EMERGENCY MEDICINE
Payer: COMMERCIAL

## 2021-06-22 ENCOUNTER — APPOINTMENT (OUTPATIENT)
Dept: CT IMAGING | Facility: HOSPITAL | Age: 36
End: 2021-06-22
Attending: EMERGENCY MEDICINE
Payer: COMMERCIAL

## 2021-06-22 ENCOUNTER — TELEPHONE (OUTPATIENT)
Dept: FAMILY MEDICINE CLINIC | Facility: CLINIC | Age: 36
End: 2021-06-22

## 2021-06-22 VITALS
HEIGHT: 64 IN | BODY MASS INDEX: 46.44 KG/M2 | OXYGEN SATURATION: 98 % | HEART RATE: 90 BPM | RESPIRATION RATE: 16 BRPM | DIASTOLIC BLOOD PRESSURE: 97 MMHG | WEIGHT: 272 LBS | TEMPERATURE: 98 F | SYSTOLIC BLOOD PRESSURE: 176 MMHG

## 2021-06-22 DIAGNOSIS — R20.2 PARESTHESIA: Primary | ICD-10-CM

## 2021-06-22 DIAGNOSIS — I10 HYPERTENSION, UNSPECIFIED TYPE: ICD-10-CM

## 2021-06-22 PROCEDURE — 93010 ELECTROCARDIOGRAM REPORT: CPT

## 2021-06-22 PROCEDURE — 84484 ASSAY OF TROPONIN QUANT: CPT | Performed by: EMERGENCY MEDICINE

## 2021-06-22 PROCEDURE — 96375 TX/PRO/DX INJ NEW DRUG ADDON: CPT

## 2021-06-22 PROCEDURE — 96374 THER/PROPH/DIAG INJ IV PUSH: CPT

## 2021-06-22 PROCEDURE — 70450 CT HEAD/BRAIN W/O DYE: CPT | Performed by: EMERGENCY MEDICINE

## 2021-06-22 PROCEDURE — 99284 EMERGENCY DEPT VISIT MOD MDM: CPT

## 2021-06-22 PROCEDURE — 81003 URINALYSIS AUTO W/O SCOPE: CPT | Performed by: EMERGENCY MEDICINE

## 2021-06-22 PROCEDURE — 71045 X-RAY EXAM CHEST 1 VIEW: CPT | Performed by: EMERGENCY MEDICINE

## 2021-06-22 PROCEDURE — 80053 COMPREHEN METABOLIC PANEL: CPT | Performed by: EMERGENCY MEDICINE

## 2021-06-22 PROCEDURE — 99285 EMERGENCY DEPT VISIT HI MDM: CPT

## 2021-06-22 PROCEDURE — 85025 COMPLETE CBC W/AUTO DIFF WBC: CPT | Performed by: EMERGENCY MEDICINE

## 2021-06-22 PROCEDURE — 93005 ELECTROCARDIOGRAM TRACING: CPT

## 2021-06-22 PROCEDURE — 81025 URINE PREGNANCY TEST: CPT

## 2021-06-22 RX ORDER — HYDRALAZINE HYDROCHLORIDE 20 MG/ML
10 INJECTION INTRAMUSCULAR; INTRAVENOUS ONCE
Status: COMPLETED | OUTPATIENT
Start: 2021-06-22 | End: 2021-06-22

## 2021-06-22 RX ORDER — KETOROLAC TROMETHAMINE 30 MG/ML
30 INJECTION, SOLUTION INTRAMUSCULAR; INTRAVENOUS ONCE
Status: COMPLETED | OUTPATIENT
Start: 2021-06-22 | End: 2021-06-22

## 2021-06-22 NOTE — ED PROVIDER NOTES
Patient Seen in: [facility] Emergency Department      History   Patient presents with:  Hypertension  Headache    Stated Complaint: htn with headaches and bilateral hand tingling.      HPI/Subjective:   HPI    51-year-old female comes to the hospital 9/10/2005        Quit date: 10/25/2017        Years since quitting: 3.6      Smokeless tobacco: Never Used    Vaping Use      Vaping Use: Former    Alcohol use: No      Comment: not since pregnant    Drug use: Yes      Frequency: 1.0 times per week      Ty RDW-SD 49.1 (*)     Lymphocyte Absolute 4.26 (*)     All other components within normal limits   TROPONIN I - Normal   POCT PREGNANCY URINE - Normal   CBC WITH DIFFERENTIAL WITH PLATELET    Narrative:      The following orders were created for panel order C Priscilla Centeno MD on 6/22/2021 at 7:40 PM     Finalized by (CST): Michael Alvarez MD on 6/22/2021 at 7:42 PM       XR CHEST AP PORTABLE  (CPT=71045)    Result Date: 6/22/2021  PROCEDURE:  XR CHEST AP PORTABLE  (CPT=71045)  TECHNIQUE:  AP chest radiograph was obtained.   C discussed signs and symptoms that should prompt the patient's immediate return to the emergency department. Reasonable over the counter and prescription treatment options and Physician follow up plan was discussed.        The patient is discharged in good

## 2021-06-22 NOTE — ED INITIAL ASSESSMENT (HPI)
Pt presents to the ED with complaints of waking this morning around 6 am with numbness to left hand, headache, lightheadedness and elevated blood pressure. Pt called her physician who told her to come to the ED.  Pt awake and alert, skin w/d,resps reg/unlab

## 2021-06-22 NOTE — TELEPHONE ENCOUNTER
Spoke to pt   She has had L hand tingling since this morning   Intermittent headache last few days rated 5/10   Intensity changes but staying there   Took some Aleve for headache with no relief.    She denies any neck pain - denies any SOB or CP, no vision

## 2021-07-03 ENCOUNTER — HOSPITAL ENCOUNTER (EMERGENCY)
Facility: HOSPITAL | Age: 36
Discharge: HOME | End: 2021-07-03
Attending: EMERGENCY MEDICINE
Payer: COMMERCIAL

## 2021-07-03 VITALS
HEIGHT: 64 IN | HEART RATE: 76 BPM | BODY MASS INDEX: 4.61 KG/M2 | DIASTOLIC BLOOD PRESSURE: 112 MMHG | WEIGHT: 27 LBS | RESPIRATION RATE: 20 BRPM | SYSTOLIC BLOOD PRESSURE: 189 MMHG | TEMPERATURE: 98 F | OXYGEN SATURATION: 99 %

## 2021-07-03 DIAGNOSIS — S05.02XA ABRASION OF LEFT CORNEA, INITIAL ENCOUNTER: Primary | ICD-10-CM

## 2021-07-03 PROCEDURE — 99283 EMERGENCY DEPT VISIT LOW MDM: CPT

## 2021-07-03 RX ORDER — KETOROLAC TROMETHAMINE 5 MG/ML
1 SOLUTION OPHTHALMIC 4 TIMES DAILY
Qty: 1 EACH | Refills: 0 | Status: SHIPPED | OUTPATIENT
Start: 2021-07-03 | End: 2021-10-11 | Stop reason: ALTCHOICE

## 2021-07-03 RX ORDER — KETOROLAC TROMETHAMINE 5 MG/ML
1 SOLUTION OPHTHALMIC 4 TIMES DAILY
Qty: 1 EACH | Refills: 0 | Status: SHIPPED | OUTPATIENT
Start: 2021-07-03 | End: 2021-07-03

## 2021-07-03 RX ORDER — TETRACAINE HYDROCHLORIDE 5 MG/ML
2 SOLUTION OPHTHALMIC ONCE
Status: COMPLETED | OUTPATIENT
Start: 2021-07-03 | End: 2021-07-03

## 2021-07-03 RX ORDER — HYDROCODONE BITARTRATE AND ACETAMINOPHEN 5; 325 MG/1; MG/1
1-2 TABLET ORAL EVERY 6 HOURS PRN
Qty: 10 TABLET | Refills: 0 | Status: SHIPPED | OUTPATIENT
Start: 2021-07-03 | End: 2021-07-10

## 2021-07-03 RX ORDER — TOBRAMYCIN 3 MG/ML
2 SOLUTION/ DROPS OPHTHALMIC
Qty: 1 EACH | Refills: 0 | Status: SHIPPED | OUTPATIENT
Start: 2021-07-03 | End: 2021-07-08

## 2021-07-03 RX ORDER — IBUPROFEN 600 MG/1
600 TABLET ORAL ONCE
Status: COMPLETED | OUTPATIENT
Start: 2021-07-03 | End: 2021-07-03

## 2021-07-03 RX ORDER — TOBRAMYCIN 3 MG/ML
2 SOLUTION/ DROPS OPHTHALMIC
Qty: 1 EACH | Refills: 0 | Status: SHIPPED | OUTPATIENT
Start: 2021-07-03 | End: 2021-07-03

## 2021-07-03 NOTE — ED PROVIDER NOTES
Patient Seen in: [facility] Emergency Department      History   Patient presents with:   Eye Visual Problem    Stated Complaint: pt states she scratched her eye a hour ago and her vision is blurry    HPI/Subjective:   HPI    31-year-old female stated 20   Ht 162.6 cm (5' 4\")   Wt 12.2 kg   LMP 05/29/2021   SpO2 99%   BMI 4.63 kg/m²     Right Eye Chart Acuity: 20/20, Corrected  Left Eye Chart Acuity: 20/400, Corrected    Physical Exam    General appearance:  This is a young adult female sitting on a gur tromethamine (ACULAR) 0.5 % Ophthalmic Solution  Place 1 drop into the left eye 4 (four) times daily.   Qty: 1 each Refills: 0

## 2021-07-03 NOTE — ED INITIAL ASSESSMENT (HPI)
Woke up about 1 hour ago and scratched L eye, unable to open it since per pt.  Denies discharge, splashed water in the eye for relief

## 2021-10-11 ENCOUNTER — HOSPITAL ENCOUNTER (EMERGENCY)
Facility: HOSPITAL | Age: 36
Discharge: LEFT BEFORE DISCHARGE | End: 2021-10-11
Payer: COMMERCIAL

## 2021-10-11 ENCOUNTER — TELEPHONE (OUTPATIENT)
Dept: FAMILY MEDICINE CLINIC | Facility: CLINIC | Age: 36
End: 2021-10-11

## 2021-10-11 VITALS
OXYGEN SATURATION: 100 % | DIASTOLIC BLOOD PRESSURE: 130 MMHG | BODY MASS INDEX: 44.39 KG/M2 | HEIGHT: 64 IN | RESPIRATION RATE: 14 BRPM | TEMPERATURE: 98 F | SYSTOLIC BLOOD PRESSURE: 198 MMHG | WEIGHT: 260 LBS | HEART RATE: 75 BPM

## 2021-10-11 NOTE — TELEPHONE ENCOUNTER
Pt called office with c/o of stomach pains x 2 weeks now. States anything she eats causes her to have stomach pain. Feels burning upper part of her stomach and nausea. Pt noticed black solid stool this morning.   She tried taking pepto last night but has n

## 2021-10-11 NOTE — TELEPHONE ENCOUNTER
Spoke to pt informed of recommendations per Dr. Andrae Durham    Pt wishes to have evaluation at ER as pain is significant at this point. Will follow-up post ER visit if needed.

## 2021-10-30 ENCOUNTER — HOSPITAL ENCOUNTER (EMERGENCY)
Age: 36
Discharge: HOME | End: 2021-10-30
Attending: EMERGENCY MEDICINE
Payer: COMMERCIAL

## 2021-10-30 VITALS
OXYGEN SATURATION: 100 % | DIASTOLIC BLOOD PRESSURE: 92 MMHG | RESPIRATION RATE: 20 BRPM | HEART RATE: 100 BPM | BODY MASS INDEX: 44.39 KG/M2 | TEMPERATURE: 99 F | SYSTOLIC BLOOD PRESSURE: 168 MMHG | HEIGHT: 64 IN | WEIGHT: 260 LBS

## 2021-10-30 DIAGNOSIS — J02.0 STREPTOCOCCAL SORE THROAT: Primary | ICD-10-CM

## 2021-10-30 PROCEDURE — 99283 EMERGENCY DEPT VISIT LOW MDM: CPT

## 2021-10-30 PROCEDURE — 87430 STREP A AG IA: CPT | Performed by: EMERGENCY MEDICINE

## 2021-10-30 RX ORDER — AMOXICILLIN 500 MG/1
500 CAPSULE ORAL ONCE
Status: COMPLETED | OUTPATIENT
Start: 2021-10-30 | End: 2021-10-30

## 2021-10-30 RX ORDER — AMOXICILLIN 500 MG/1
500 TABLET, FILM COATED ORAL 3 TIMES DAILY
Qty: 30 TABLET | Refills: 0 | Status: SHIPPED | OUTPATIENT
Start: 2021-10-30 | End: 2021-11-06

## 2021-10-30 NOTE — ED PROVIDER NOTES
Patient Seen in: [facility] Emergency Department In [place]      History   Patient presents with:  Sore Throat  Cough/URI    Stated Complaint: sore throat, cough, & chills x3 days    Subjective:   HPI    72-year-old female presents to the emergency depart 100   Resp 20   Temp 99.4 °F (37.4 °C)   Temp src Oral   SpO2 100 %   O2 Device None (Room air)       Current:BP (!) 168/92   Pulse 100   Temp 99.4 °F (37.4 °C) (Oral)   Resp 20   Ht 162.6 cm (5' 4\")   Wt 117.9 kg   LMP 10/01/2021   SpO2 100%   BMI 44.63 home.                             Disposition and Plan     Clinical Impression:  Streptococcal sore throat  (primary encounter diagnosis)     Disposition:  Discharge  10/30/2021  3:02 am    Follow-up:  Isidra Long MD  [address] [place]

## 2021-12-10 DIAGNOSIS — E66.01 CLASS 3 SEVERE OBESITY DUE TO EXCESS CALORIES WITHOUT SERIOUS COMORBIDITY WITH BODY MASS INDEX (BMI) OF 50.0 TO 59.9 IN ADULT (HCC): ICD-10-CM

## 2021-12-13 RX ORDER — PEN NEEDLE, DIABETIC 32GX 5/32"
NEEDLE, DISPOSABLE MISCELLANEOUS
Qty: 100 EACH | Refills: 0 | Status: SHIPPED | OUTPATIENT
Start: 2021-12-13

## 2021-12-13 NOTE — TELEPHONE ENCOUNTER
Refill Passed Protocol:     Pt requesting refill of   Requested Prescriptions     Pending Prescriptions Disp Refills   • BD PEN NEEDLE TREVIN 2ND GEN 32G X 4 MM Does not apply Misc [Pharmacy Med Name: B-D TREVIN 2ND GEN PEN NDL [id number]] 100 each 0     Sig:

## 2022-02-17 RX ORDER — LIRAGLUTIDE 6 MG/ML
3 INJECTION, SOLUTION SUBCUTANEOUS DAILY
Refills: 0 | OUTPATIENT
Start: 2022-02-17

## 2022-02-18 ENCOUNTER — OFFICE VISIT (OUTPATIENT)
Dept: FAMILY MEDICINE CLINIC | Facility: CLINIC | Age: 37
End: 2022-02-18
Payer: COMMERCIAL

## 2022-02-18 VITALS
RESPIRATION RATE: 18 BRPM | TEMPERATURE: 98 F | HEIGHT: 64 IN | DIASTOLIC BLOOD PRESSURE: 100 MMHG | OXYGEN SATURATION: 100 % | BODY MASS INDEX: 44.79 KG/M2 | HEART RATE: 88 BPM | WEIGHT: 262.38 LBS | SYSTOLIC BLOOD PRESSURE: 158 MMHG

## 2022-02-18 DIAGNOSIS — D50.0 IRON DEFICIENCY ANEMIA DUE TO CHRONIC BLOOD LOSS: ICD-10-CM

## 2022-02-18 DIAGNOSIS — F41.9 ANXIETY AND DEPRESSION: ICD-10-CM

## 2022-02-18 DIAGNOSIS — Z13.29 SCREENING FOR ENDOCRINE, METABOLIC, AND IMMUNITY DISORDER: ICD-10-CM

## 2022-02-18 DIAGNOSIS — F32.A ANXIETY AND DEPRESSION: ICD-10-CM

## 2022-02-18 DIAGNOSIS — Z13.0 SCREENING FOR ENDOCRINE, METABOLIC, AND IMMUNITY DISORDER: ICD-10-CM

## 2022-02-18 DIAGNOSIS — I10 BENIGN ESSENTIAL HYPERTENSION: ICD-10-CM

## 2022-02-18 DIAGNOSIS — Z00.00 ANNUAL PHYSICAL EXAM: Primary | ICD-10-CM

## 2022-02-18 DIAGNOSIS — E66.01 MORBID OBESITY WITH BMI OF 45.0-49.9, ADULT (HCC): ICD-10-CM

## 2022-02-18 DIAGNOSIS — Z13.228 SCREENING FOR ENDOCRINE, METABOLIC, AND IMMUNITY DISORDER: ICD-10-CM

## 2022-02-18 LAB
ALBUMIN SERPL-MCNC: 3.8 G/DL (ref 3.4–5)
ALBUMIN/GLOB SERPL: 1.1 {RATIO} (ref 1–2)
ALT SERPL-CCNC: 13 U/L
ANION GAP SERPL CALC-SCNC: 4 MMOL/L (ref 0–18)
AST SERPL-CCNC: 7 U/L (ref 15–37)
BASOPHILS # BLD AUTO: 0.06 X10(3) UL (ref 0–0.2)
BASOPHILS NFR BLD AUTO: 0.6 %
BILIRUB SERPL-MCNC: 0.3 MG/DL (ref 0.1–2)
BUN BLD-MCNC: 11 MG/DL (ref 7–18)
CALCIUM BLD-MCNC: 8.8 MG/DL (ref 8.5–10.1)
CHLORIDE SERPL-SCNC: 113 MMOL/L (ref 98–112)
CHOLEST SERPL-MCNC: 180 MG/DL (ref ?–200)
CO2 SERPL-SCNC: 23 MMOL/L (ref 21–32)
CREAT BLD-MCNC: 0.99 MG/DL
DEPRECATED HBV CORE AB SER IA-ACNC: 7.9 NG/ML
EOSINOPHIL # BLD AUTO: 0.34 X10(3) UL (ref 0–0.7)
EOSINOPHIL NFR BLD AUTO: 3.6 %
ERYTHROCYTE [DISTWIDTH] IN BLOOD BY AUTOMATED COUNT: 20 %
FASTING PATIENT LIPID ANSWER: YES
FASTING STATUS PATIENT QL REPORTED: YES
GLOBULIN PLAS-MCNC: 3.6 G/DL (ref 2.8–4.4)
GLUCOSE BLD-MCNC: 75 MG/DL (ref 70–99)
HCT VFR BLD AUTO: 34.4 %
HDLC SERPL-MCNC: 55 MG/DL (ref 40–59)
IMM GRANULOCYTES # BLD AUTO: 0.02 X10(3) UL (ref 0–1)
IMM GRANULOCYTES NFR BLD: 0.2 %
IRON SATN MFR SERPL: 4 %
IRON SERPL-MCNC: 18 UG/DL
LDLC SERPL CALC-MCNC: 111 MG/DL (ref ?–100)
LYMPHOCYTES # BLD AUTO: 3.33 X10(3) UL (ref 1–4)
LYMPHOCYTES NFR BLD AUTO: 35.5 %
MCHC RBC AUTO-ENTMCNC: 27.6 G/DL (ref 31–37)
MCV RBC AUTO: 69.2 FL
MONOCYTES # BLD AUTO: 0.57 X10(3) UL (ref 0.1–1)
MONOCYTES NFR BLD AUTO: 6.1 %
NEUTROPHILS # BLD AUTO: 5.07 X10 (3) UL (ref 1.5–7.7)
NEUTROPHILS # BLD AUTO: 5.07 X10(3) UL (ref 1.5–7.7)
NEUTROPHILS NFR BLD AUTO: 54 %
NONHDLC SERPL-MCNC: 125 MG/DL (ref ?–130)
OSMOLALITY SERPL CALC.SUM OF ELEC: 288 MOSM/KG (ref 275–295)
PLATELET # BLD AUTO: 311 10(3)UL (ref 150–450)
POTASSIUM SERPL-SCNC: 3.8 MMOL/L (ref 3.5–5.1)
PROT SERPL-MCNC: 7.4 G/DL (ref 6.4–8.2)
RBC # BLD AUTO: 4.97 X10(6)UL
SODIUM SERPL-SCNC: 140 MMOL/L (ref 136–145)
TIBC SERPL-MCNC: 501 UG/DL (ref 240–450)
TRANSFERRIN SERPL-MCNC: 336 MG/DL (ref 200–360)
TRIGL SERPL-MCNC: 75 MG/DL (ref 30–149)
TSI SER-ACNC: 2.08 MIU/ML (ref 0.36–3.74)
VLDLC SERPL CALC-MCNC: 13 MG/DL (ref 0–30)
WBC # BLD AUTO: 9.4 X10(3) UL (ref 4–11)

## 2022-02-18 PROCEDURE — 3008F BODY MASS INDEX DOCD: CPT | Performed by: FAMILY MEDICINE

## 2022-02-18 PROCEDURE — 83550 IRON BINDING TEST: CPT | Performed by: FAMILY MEDICINE

## 2022-02-18 PROCEDURE — 3080F DIAST BP >= 90 MM HG: CPT | Performed by: FAMILY MEDICINE

## 2022-02-18 PROCEDURE — 36415 COLL VENOUS BLD VENIPUNCTURE: CPT | Performed by: FAMILY MEDICINE

## 2022-02-18 PROCEDURE — 82728 ASSAY OF FERRITIN: CPT | Performed by: FAMILY MEDICINE

## 2022-02-18 PROCEDURE — 99395 PREV VISIT EST AGE 18-39: CPT | Performed by: FAMILY MEDICINE

## 2022-02-18 PROCEDURE — 80061 LIPID PANEL: CPT | Performed by: FAMILY MEDICINE

## 2022-02-18 PROCEDURE — 80050 GENERAL HEALTH PANEL: CPT | Performed by: FAMILY MEDICINE

## 2022-02-18 PROCEDURE — 3077F SYST BP >= 140 MM HG: CPT | Performed by: FAMILY MEDICINE

## 2022-02-18 PROCEDURE — 83540 ASSAY OF IRON: CPT | Performed by: FAMILY MEDICINE

## 2022-02-18 RX ORDER — FLUCONAZOLE 150 MG/1
TABLET ORAL
COMMUNITY
Start: 2021-09-21 | End: 2022-02-18 | Stop reason: ALTCHOICE

## 2022-02-18 RX ORDER — HYDROCHLOROTHIAZIDE 12.5 MG/1
12.5 TABLET ORAL DAILY
Qty: 90 TABLET | Refills: 0 | Status: SHIPPED | OUTPATIENT
Start: 2022-02-18

## 2022-02-18 RX ORDER — AMOXICILLIN 875 MG/1
875 TABLET, COATED ORAL EVERY 12 HOURS
COMMUNITY
Start: 2021-11-09 | End: 2022-02-18 | Stop reason: ALTCHOICE

## 2022-02-18 NOTE — PROGRESS NOTES
Patient came in for draw of ordered fasting labs. Patient drawn out of r AC, x 1 attempt and tolerated well.  1 gold and 1 lav tube drawn.

## 2022-03-14 ENCOUNTER — TELEMEDICINE (OUTPATIENT)
Dept: FAMILY MEDICINE CLINIC | Facility: CLINIC | Age: 37
End: 2022-03-14

## 2022-03-14 DIAGNOSIS — R10.13 EPIGASTRIC PAIN: Primary | ICD-10-CM

## 2022-03-14 DIAGNOSIS — K29.00 ACUTE GASTRITIS WITHOUT HEMORRHAGE, UNSPECIFIED GASTRITIS TYPE: ICD-10-CM

## 2022-03-14 PROCEDURE — 99213 OFFICE O/P EST LOW 20 MIN: CPT | Performed by: FAMILY MEDICINE

## 2022-03-14 RX ORDER — ONDANSETRON 4 MG/1
TABLET, ORALLY DISINTEGRATING ORAL
COMMUNITY
Start: 2022-03-13

## 2022-03-14 RX ORDER — DICYCLOMINE HCL 20 MG
TABLET ORAL
COMMUNITY
Start: 2022-03-13

## 2022-03-14 RX ORDER — OMEPRAZOLE 40 MG/1
40 CAPSULE, DELAYED RELEASE ORAL DAILY
Qty: 30 CAPSULE | Refills: 1 | Status: SHIPPED | OUTPATIENT
Start: 2022-03-14

## 2022-03-14 NOTE — PROGRESS NOTES
Video Visit    This visit is conducted using Telemedicine with live, interactive video and audio. Meghan Burleson  verbally consents to a Video visit. Patient understands and accepts financial responsibility for any deductible, co-insurance and/or co-pays associated with this service. Duration of the service: 10:28 minutes      Summary of topics discussed:     ED f-u: Pt was having 1 week of diarrhea, then had severe epigastric abdominal pain. She also had some black stool, which is now resolved. She states she took [employer] prior to this. She never had any fever, but had chills. No cough or cold symptoms. She went to the [facility] ED yesterday 3/13- UA was normal (since she was worried about a kidney stone), blood work was normal, US of gallbladder and CT Abdomen was normal. She states a rectal exam was not performed. She was sent home on Zofran, which she has not picked up yet. Today she is having abdominal pain with nausea again, she is afraid to eat. She reports she has had dental pain and has been taking Ibuprofen 600 mg BID for the last several days. She has not been eating spicy foods or acidic fruits or juices. She mentions she had EtOH yesterday- a shot of rum mixed with Coke. ROS: as states per HPI  Physical Exam: Exam is limited due to no face to face visit today due to the Covid-19 pandemic. Pt is awake and alert, does not appear to be in acute distress. Unable to perform abdominal exam given limitations of visit type. Assessment/Plan:  1) Epigastric pain  - went to [facility] ED on 3/13, workup was negative for upper GI bleed  - cont Zofran prn, and follow anti-reflux diet  - recommend to start daily Omeprazole 40 mg x 1-2 months  - f-u in 4-6 wks    2) Gastritis  See above. Return in about 4 weeks (around 4/11/2022) for Gastritis/abdominal pain f-u in 4-6 wks.       Cedric Parrish MD

## 2022-03-21 ENCOUNTER — OFFICE VISIT (OUTPATIENT)
Dept: HEMATOLOGY/ONCOLOGY | Facility: HOSPITAL | Age: 37
End: 2022-03-21
Attending: SPECIALIST
Payer: COMMERCIAL

## 2022-03-21 DIAGNOSIS — D50.0 IRON DEFICIENCY ANEMIA SECONDARY TO BLOOD LOSS (CHRONIC): Primary | ICD-10-CM

## 2022-03-21 DIAGNOSIS — D50.8 IRON DEFICIENCY ANEMIA REFRACTORY TO IRON THERAPY: ICD-10-CM

## 2022-03-21 DIAGNOSIS — R10.13 EPIGASTRIC PAIN: ICD-10-CM

## 2022-03-21 DIAGNOSIS — N92.0 MENORRHAGIA WITH REGULAR CYCLE: ICD-10-CM

## 2022-03-21 PROCEDURE — 99245 OFF/OP CONSLTJ NEW/EST HI 55: CPT | Performed by: SPECIALIST

## 2022-03-21 NOTE — PROGRESS NOTES
THE [facility] [place] [facility] Consultation Note      Patient Name: Aleksandar Zhao   YOB: 1985  Medical Record Number: [id number]  Consulting Physician: Orly Sandoval M.D. Referring Physician: Jose Ordoñez MD    Date of Consultation: 3/21/2022      Reason for Consultation (Chief Complaint)  Iron deficiency anemia. History of Present Illness  Delaney Deutsch is a 31year old female who on 2022 had a complete blood count which showed a hemoglobin of 9.5 g/dl with low MCV, MCH, and MCHC; low iron, iron saturation, and ferritin; and elevated TIBC. Of note, laboratory studies 1 year prior in 2021 also were consistent with iron deficiency anemia. A review of available laboratory studies in Epic shows a persistent microcytic anemia dating back to at least . Patient was scheduled to see me on 2021 but failed to present for her appointment. Patient states that she was seen earlier this month at [facility] for abdominal pain. She reports have a CT abdomen/pelvis which was unremarkable. She states that her primary feels that her abdominal pain was related to NSAID use for dental pain. She is now on omeprazole and states that her pain is better but still persists. She reports having black colored stools but only when she was taking Pepto Bismol. She did not have an EGD at [facility]. She denies bright red blood per rectum. Patient reports heavy menstrual periods that last 8 days per month and are heavy for 6-7 of those days. She states she has never specifically addressed this issue. She complains of fatigue, dyspnea with exertion, hair thinning, and pica for ice chips. Past Medical History   Pre-eclampsia; obstructive sleep apnea; essential hypertension. Past Surgical History   ; tubal ligation. Family History   Paternal grandfather, paternal uncle, paternal aunts x 2 with cancer but she does not know the types.  While in the room, she called her father who said he also does not know the types of cancer in the family. Social History   Previous tobacco use but quit in 2017. Current Medications  ondansetron 4 MG Oral Tablet Dispersible, , Disp: , Rfl:   dicyclomine 20 MG Oral Tab, , Disp: , Rfl:   Omeprazole 40 MG Oral Capsule Delayed Release, Take 1 capsule (40 mg total) by mouth daily. , Disp: 30 capsule, Rfl: 1  hydrochlorothiazide 12.5 MG Oral Tab, Take 1 tablet (12.5 mg total) by mouth daily. , Disp: 90 tablet, Rfl: 0  losartan Potassium 50 MG Oral Tab, Take 1 tablet (50 mg total) by mouth daily. , Disp: 90 tablet, Rfl: 1    Allergies   Ms. oBo Saleh is allergic to seasonal.       Review of Systems   Constitutional      Fatigue. Allergic/Immunologic No reactions. Eyes                   No significant visual changes. ENMT                  No oral pain, sore throat, epistaxis, hearing changes. Hematologic/Lymphatic No tender or enlarged lymph nodes; no easy bruising or bleeding. Breasts No self palpated masses; no nipple discharge. Respiratory          Dyspnea with exertion. Cardiovascular     No anginal chest pain, palpitations, edema, orthopnea. Gastrointestinal   No melena, hematochezia. Genitourinary      Menorrhagia. Musculoskeletal   No edema; no joint pain, swelling, redness, change in range of motion. Integumentary      No acute or chronic rashes. Neurologic           No headache, blurred vision, areas of focal weakness or numbness, peripheral neuropathy, changes in gait. Psychiatric          No new or worsening depression, pauyl, mood swings, insomnia. Vital Signs   Reviewed in electronic medical record. Physical Examination   Constitutional      Well developed, well nourished. Appears close to chronological age. No apparent distress. Head Normocephalic and atraumatic. Eyes Conjunctiva clear; sclera anicteric.   ENMT                 External nose normal; external ears normal.  Neck Supple, without masses. Hematologic/Lymphatic No cervical, supraclavicular, axillary lymphadenopathy. Respiratory          Normal effort; no respiratory distress; lungs clear to auscultation bilaterally. Cardiovascular     Regular rate and rhythm. Abdomen            There is epigastric tenderness to deep palpation; non-distended; no masses; no fluid wave; no hepatosplenomegaly. Musculoskeletal   No joint swelling or erythema. Extremities          No lower extremity edema. Integumentary      No obvious rashes. Neurologic           Motor and sensory grossly intact. Psychiatric          Mood and affect appropriate. Laboratory   Recent Results (from the past 1344 hour(s))   COMP METABOLIC PANEL (14)    Collection Time: 02/18/22 11:12 AM   Result Value Ref Range    Glucose 75 70 - 99 mg/dL    Sodium 140 136 - 145 mmol/L    Potassium 3.8 3.5 - 5.1 mmol/L    Chloride 113 (H) 98 - 112 mmol/L    CO2 23.0 21.0 - 32.0 mmol/L    Anion Gap 4 0 - 18 mmol/L    BUN 11 7 - 18 mg/dL    Creatinine 0.99 0.55 - 1.02 mg/dL    Calcium, Total 8.8 8.5 - 10.1 mg/dL    Calculated Osmolality 288 275 - 295 mOsm/kg    GFR, Non- 75 >=60    GFR, -American 86 >=60    AST 7 (L) 15 - 37 U/L    ALT 13 13 - 56 U/L    Alkaline Phosphatase 43 37 - 98 U/L    Bilirubin, Total 0.3 0.1 - 2.0 mg/dL    Total Protein 7.4 6.4 - 8.2 g/dL    Albumin 3.8 3.4 - 5.0 g/dL    Globulin  3.6 2.8 - 4.4 g/dL    A/G Ratio 1.1 1.0 - 2.0    Patient Fasting for CMP? Yes    LIPID PANEL    Collection Time: 02/18/22 11:12 AM   Result Value Ref Range    Cholesterol, Total 180 <200 mg/dL    HDL Cholesterol 55 40 - 59 mg/dL    Triglycerides 75 30 - 149 mg/dL    LDL Cholesterol 111 (H) <100 mg/dL    VLDL 13 0 - 30 mg/dL    Non HDL Chol 125 <130 mg/dL    Patient Fasting for Lipid?  Yes    TSH W REFLEX TO FREE T4    Collection Time: 02/18/22 11:12 AM   Result Value Ref Range    TSH 2.080 0.358 - 3.740 mIU/mL   FERRITIN    Collection Time: 02/18/22 11:12 AM   Result Value Ref Range    Ferritin 7.9 (L) 12.0 - 160.0 ng/mL   IRON AND TIBC    Collection Time: 02/18/22 11:12 AM   Result Value Ref Range    Iron 18 (L) 50 - 170 ug/dL    Transferrin 336 200 - 360 mg/dL    Total Iron Binding Capacity 501 (H) 240 - 450 ug/dL    % Saturation 4 (L) 15 - 50 %   CBC W/ DIFFERENTIAL    Collection Time: 02/18/22 11:12 AM   Result Value Ref Range    WBC 9.4 4.0 - 11.0 x10(3) uL    RBC 4.97 3.80 - 5.30 x10(6)uL    HGB 9.5 (L) 12.0 - 16.0 g/dL    HCT 34.4 (L) 35.0 - 48.0 %    .0 150.0 - 450.0 10(3)uL    MCV 69.2 (L) 80.0 - 100.0 fL    MCH 19.1 (L) 26.0 - 34.0 pg    MCHC 27.6 (L) 31.0 - 37.0 g/dL    RDW 20.0 %    Neutrophil Absolute Prelim 5.07 1.50 - 7.70 x10 (3) uL    Neutrophil Absolute 5.07 1.50 - 7.70 x10(3) uL    Lymphocyte Absolute 3.33 1.00 - 4.00 x10(3) uL    Monocyte Absolute 0.57 0.10 - 1.00 x10(3) uL    Eosinophil Absolute 0.34 0.00 - 0.70 x10(3) uL    Basophil Absolute 0.06 0.00 - 0.20 x10(3) uL    Immature Granulocyte Absolute 0.02 0.00 - 1.00 x10(3) uL    Neutrophil % 54.0 %    Lymphocyte % 35.5 %    Monocyte % 6.1 %    Eosinophil % 3.6 %    Basophil % 0.6 %    Immature Granulocyte % 0.2 %     Impression and Plan   1. Iron deficiency anemia: Patient is symptomatic. I recommend IV iron therapy with iron dextran. I reviewed the potential adverse effects including anaphylaxis. Patient expressed understanding and provided verbal consent for treatment. She will return on 03/23/2022 for therapy. Given the degree of iron deficiency, patient may still be in deficit after therapy. Therefore, she will return in approximately 6 weeks for repeat labs. If she still requires IV iron, it will be arranged. The etiology of her iron deficiency is likely menorrhagia given her description of her periods and the documented duration of anemia.  I explained to the patient that she will continue to have issues with iron deficiency anemia unless her menstrual losses are decreased. I recommend evaluation by gynecology and patient is in agreement. Unless her menstrual losses decrease significantly, I will recommend that she return every 3 months for labs to determine at what rate she develops iron deficiency. She will receive replacement therapy as needed. If her abdominal pain does not resolved with her current PPI therapy, I recommend evaluation by GI for EGD. Planned Follow Up   Patient will return in 2 days for IV iron. Risk Level: High - iron deficiency anemia requiring IV iron therapy. Electronically signed by:    Eula Mcdaniel M.D.   Associate Medical Director of Oncology [facility], [place], [place]

## 2022-03-21 NOTE — PROGRESS NOTES
Patient is here today for Consult  with   Anemia. Patient denies pain. Stated is having stomach issues. Medication list and medical history were reviewed and updated. Education Record    Learner:  Patient    Disease / Diagnosis: Consult with Dr. Daksha Arellano     Barriers / Limitations:  None   Comments:    Method:  Brief focused, Discussion, Printed material and Reinforcement   Comments:    General Topics:  Medication, Pain, Procedure and Plan of care reviewed   Comments:    Outcome:  Shows understanding   Comments:    AVS provided and follow up reviewed. Patient instructed to call as needed.

## 2022-03-23 ENCOUNTER — OFFICE VISIT (OUTPATIENT)
Dept: HEMATOLOGY/ONCOLOGY | Facility: HOSPITAL | Age: 37
End: 2022-03-23
Attending: SPECIALIST
Payer: COMMERCIAL

## 2022-03-23 VITALS
HEART RATE: 61 BPM | DIASTOLIC BLOOD PRESSURE: 102 MMHG | OXYGEN SATURATION: 100 % | TEMPERATURE: 98 F | RESPIRATION RATE: 16 BRPM | SYSTOLIC BLOOD PRESSURE: 173 MMHG

## 2022-03-23 DIAGNOSIS — D50.0 IRON DEFICIENCY ANEMIA SECONDARY TO BLOOD LOSS (CHRONIC): Primary | ICD-10-CM

## 2022-03-23 DIAGNOSIS — D50.8 IRON DEFICIENCY ANEMIA REFRACTORY TO IRON THERAPY: ICD-10-CM

## 2022-03-23 PROCEDURE — 96376 TX/PRO/DX INJ SAME DRUG ADON: CPT

## 2022-03-23 PROCEDURE — 96365 THER/PROPH/DIAG IV INF INIT: CPT

## 2022-03-23 NOTE — PROGRESS NOTES
Education Record    Learner:  Patient    Disease / Diagnosis:iron deficiency    Barriers / Limitations:  None   Comments:    Method:  Discussion   Comments:    General Topics:  Medication and Plan of care reviewed   Comments:    Outcome:  Shows understanding   Comments:

## 2022-03-31 ENCOUNTER — OFFICE VISIT (OUTPATIENT)
Dept: INTERNAL MEDICINE CLINIC | Facility: CLINIC | Age: 37
End: 2022-03-31
Payer: COMMERCIAL

## 2022-03-31 ENCOUNTER — LAB ENCOUNTER (OUTPATIENT)
Dept: LAB | Age: 37
End: 2022-03-31
Attending: NURSE PRACTITIONER
Payer: COMMERCIAL

## 2022-03-31 VITALS
BODY MASS INDEX: 41.99 KG/M2 | HEIGHT: 65 IN | OXYGEN SATURATION: 98 % | HEART RATE: 74 BPM | SYSTOLIC BLOOD PRESSURE: 170 MMHG | RESPIRATION RATE: 17 BRPM | DIASTOLIC BLOOD PRESSURE: 120 MMHG | WEIGHT: 252 LBS

## 2022-03-31 DIAGNOSIS — E66.01 MORBID OBESITY (HCC): ICD-10-CM

## 2022-03-31 DIAGNOSIS — F41.9 ANXIETY AND DEPRESSION: ICD-10-CM

## 2022-03-31 DIAGNOSIS — R06.83 SNORING: ICD-10-CM

## 2022-03-31 DIAGNOSIS — I10 BENIGN ESSENTIAL HYPERTENSION: ICD-10-CM

## 2022-03-31 DIAGNOSIS — Z51.81 THERAPEUTIC DRUG MONITORING: Primary | ICD-10-CM

## 2022-03-31 DIAGNOSIS — Z51.81 THERAPEUTIC DRUG MONITORING: ICD-10-CM

## 2022-03-31 DIAGNOSIS — F32.A ANXIETY AND DEPRESSION: ICD-10-CM

## 2022-03-31 DIAGNOSIS — Z99.89 OSA ON CPAP: ICD-10-CM

## 2022-03-31 DIAGNOSIS — D50.8 IRON DEFICIENCY ANEMIA REFRACTORY TO IRON THERAPY: ICD-10-CM

## 2022-03-31 DIAGNOSIS — G47.33 OSA ON CPAP: ICD-10-CM

## 2022-03-31 LAB
EST. AVERAGE GLUCOSE BLD GHB EST-MCNC: 103 MG/DL (ref 68–126)
HBA1C MFR BLD: 5.2 % (ref ?–5.7)
VIT B12 SERPL-MCNC: 385 PG/ML (ref 193–986)
VIT D+METAB SERPL-MCNC: 21.6 NG/ML (ref 30–100)

## 2022-03-31 PROCEDURE — 99204 OFFICE O/P NEW MOD 45 MIN: CPT | Performed by: NURSE PRACTITIONER

## 2022-03-31 PROCEDURE — 3080F DIAST BP >= 90 MM HG: CPT | Performed by: NURSE PRACTITIONER

## 2022-03-31 PROCEDURE — 3008F BODY MASS INDEX DOCD: CPT | Performed by: NURSE PRACTITIONER

## 2022-03-31 PROCEDURE — 82607 VITAMIN B-12: CPT | Performed by: NURSE PRACTITIONER

## 2022-03-31 PROCEDURE — 3077F SYST BP >= 140 MM HG: CPT | Performed by: NURSE PRACTITIONER

## 2022-03-31 PROCEDURE — 83036 HEMOGLOBIN GLYCOSYLATED A1C: CPT | Performed by: NURSE PRACTITIONER

## 2022-03-31 PROCEDURE — 82306 VITAMIN D 25 HYDROXY: CPT | Performed by: NURSE PRACTITIONER

## 2022-03-31 NOTE — PATIENT INSTRUCTIONS
We are here to support you with weight loss, but please remember that you still need your primary care provider for your routine health maintenance. PLAN:  Will check with insurance on coverage for (ie. saxenda or wegovy)   Get blood work done- a1c and vitamin levels  Look into sleep study information- orders placed   Follow up with me in 4 weeks  Schedule follow up appointments: Francisco Joseph (dietitian) or Kasandra Elizabeth (presurgery dietitian)   Check for insurance coverage for dietitian and labwork prior to scheduling appointment. Please try to work on the following dietary changes:  1. Goals: Aim for 20-30 grams of protein/ meal  i. Aim for 140 grams of carbohydrates/day  ii. Eat 4-6 vegetables/day  iii. Avoid skipping meals- eat every 4-5 hours  iv. Aim for 3 meals/day  2. Drink lots of water and cut down on soda/juice consumption if soda/juice drinker  3. Focus on protein: (15-30 grams with each meal) ie. greek yogurt, cottage cheese, string cheese, hard boiled eggs  4. Healthy snacks: peanut butter and apples, hummus and carrots, berries, nuts (1/4 cup), tuna and crackers                 Protein Shakes: Premier protein or Core Power                Protein Bars: Rx Bars, Oatmega, Power Crunch                 [employer] balanced breaks (cheese and nuts)- without chocolate  5. Reduce carbohydrates which includes sweets as well as rice, pasta, potatoes, bread, corn and instead choose whole grain options or more protein or vegetables (4-6 servings of vegetables per day)  6. Get a good night of sleep  7. Try to decrease stress in life     Please download apps:  1. \"My Fitness Pal\" (other option is Lose it)) to help you to monitor daily dietary intake and you will be able to see if you are eating the right amount of calories, protein, carbs                With My Fitness Pal-->When you set-up the courtney or need to adjust settings:                Goals should include:                  Lose 1.5-2 lbs per week Activity level: not very active (can't count exercise towards calorie number per day)                   ** Daily INPUT> Look at nutrition section-- \"nutrients\" and it will break down your macros for the day (ie. Protein, carbs, fibers, sugars and fats). Try to stay within these numbers daily     2. \"7 minute workout\" to help with exercise/activity which takes 7 minutes of your day and that you can do at home! 3. \"Calm\" or \"Headspace\" which helps with mindfulness, meditation, clarity, sleep, and holger to your daily life. 4. [employer] blog for healthy recipe ideas  5. [employer] for low carb resources    HIGH PROTEIN SNACK IDEAS  -cottage cheese  -plain yogurt  -kefir  -hard-boiled eggs  -natural cheeses  -nuts (measure portion size)   -unsweetened nut butters  -dried edamame   -tyler seeds soaked in water or almond milk  -soy nuts  -cured meats (monitor for sodium issues)   -hummus with vegetables  -bean dip with vegetables     FRUIT  Low carb fruit options   Raspberries: Half a cup (60 grams) contains 3 grams of carbs. Blackberries: Half a cup (70 grams) contains 4 grams of carbs. Strawberries: Half a cup (100 grams) contains 6 grams of carbs. Blueberries: Half a cup (50 grams) contains 6 grams of carbs. Plum: One medium-sized (80 grams) contains 6 grams of carbs.      VEGETABLES  Low carb vegetables

## 2022-04-01 NOTE — PROGRESS NOTES
A1c is normal   Low vitamin D level, please start taking ergocalciferol 50,000 U q week 16 weeks (please order #16 no refill).  Then start daily maintenance vitamin D 2000 Units   Mildly low Vitamin b level, please start daily over the counter b complex vitamin

## 2022-04-28 ENCOUNTER — OFFICE VISIT (OUTPATIENT)
Dept: INTERNAL MEDICINE CLINIC | Facility: CLINIC | Age: 37
End: 2022-04-28
Payer: COMMERCIAL

## 2022-04-28 ENCOUNTER — PATIENT MESSAGE (OUTPATIENT)
Dept: INTERNAL MEDICINE CLINIC | Facility: CLINIC | Age: 37
End: 2022-04-28

## 2022-04-28 VITALS
BODY MASS INDEX: 42.65 KG/M2 | DIASTOLIC BLOOD PRESSURE: 90 MMHG | HEIGHT: 65 IN | HEART RATE: 64 BPM | WEIGHT: 256 LBS | OXYGEN SATURATION: 100 % | SYSTOLIC BLOOD PRESSURE: 142 MMHG | RESPIRATION RATE: 16 BRPM

## 2022-04-28 DIAGNOSIS — F41.9 ANXIETY AND DEPRESSION: ICD-10-CM

## 2022-04-28 DIAGNOSIS — G47.33 OSA ON CPAP: ICD-10-CM

## 2022-04-28 DIAGNOSIS — E66.01 MORBID OBESITY (HCC): ICD-10-CM

## 2022-04-28 DIAGNOSIS — R06.83 SNORING: ICD-10-CM

## 2022-04-28 DIAGNOSIS — Z51.81 THERAPEUTIC DRUG MONITORING: Primary | ICD-10-CM

## 2022-04-28 DIAGNOSIS — F32.A ANXIETY AND DEPRESSION: ICD-10-CM

## 2022-04-28 DIAGNOSIS — Z99.89 OSA ON CPAP: ICD-10-CM

## 2022-04-28 DIAGNOSIS — I10 BENIGN ESSENTIAL HYPERTENSION: ICD-10-CM

## 2022-04-28 PROCEDURE — 3080F DIAST BP >= 90 MM HG: CPT | Performed by: NURSE PRACTITIONER

## 2022-04-28 PROCEDURE — 3077F SYST BP >= 140 MM HG: CPT | Performed by: NURSE PRACTITIONER

## 2022-04-28 PROCEDURE — 3008F BODY MASS INDEX DOCD: CPT | Performed by: NURSE PRACTITIONER

## 2022-04-28 PROCEDURE — 99214 OFFICE O/P EST MOD 30 MIN: CPT | Performed by: NURSE PRACTITIONER

## 2022-04-28 RX ORDER — LIRAGLUTIDE 6 MG/ML
3 INJECTION, SOLUTION SUBCUTANEOUS DAILY
Qty: 1 EACH | Refills: 0 | COMMUNITY
Start: 2022-04-28

## 2022-04-28 RX ORDER — LIRAGLUTIDE 6 MG/ML
3 INJECTION, SOLUTION SUBCUTANEOUS DAILY
Qty: 15 ML | Refills: 1 | Status: SHIPPED | OUTPATIENT
Start: 2022-04-28

## 2022-04-28 NOTE — PATIENT INSTRUCTIONS
We are here to support you with weight loss, but please remember that you still need your primary care provider for your routine health maintenance. PLAN:  Will Saxenda:   Start therapy:  Week 1- .6mg daily   Week 2- 1.2mg daily   Week 3- 1.8mg daily   Week 4- 2.4mg daily   Week 5- 3mg daily  Will check with insurance company about saxenda or wegovy coverage   Follow up with me in 4 weeks  Schedule follow up appointments: Spencer Cárdenas (dietitian) or Cullen Hassan (presurgery dietitian)   Check for insurance coverage for dietitian and labwork prior to scheduling appointment. Please try to work on the following dietary changes:  1. Goals: Aim for 20-30 grams of protein/ meal  i. Aim for 130 grams of carbohydrates/day  ii. Eat 4-6 vegetables/day  iii. Avoid skipping meals- eat every 4-5 hours  iv. Aim for 3 meals/day  2. Drink lots of water and cut down on soda/juice consumption if soda/juice drinker  3. Focus on protein: (15-30 grams with each meal) ie. greek yogurt, cottage cheese, string cheese, hard boiled eggs  4. Healthy snacks: peanut butter and apples, hummus and carrots, berries, nuts (1/4 cup), tuna and crackers                 Protein Shakes: Premier protein or Core Power                Protein Bars: Rx Bars, Oatmega, Power Crunch                 [employer] balanced breaks (cheese and nuts)- without chocolate  5. Reduce carbohydrates which includes sweets as well as rice, pasta, potatoes, bread, corn and instead choose whole grain options or more protein or vegetables (4-6 servings of vegetables per day)  6. Get a good night of sleep  7. Try to decrease stress in life     Please download apps:  1.  \"My Fitness Pal\" (other option is Lose it)) to help you to monitor daily dietary intake and you will be able to see if you are eating the right amount of calories, protein, carbs                With My Fitness Pal-->When you set-up the courtney or need to adjust settings:                Goals should include: Lose 1.5-2 lbs per week                Activity level: not very active (can't count exercise towards calorie number per day)                   ** Daily INPUT> Look at nutrition section-- \"nutrients\" and it will break down your macros for the day (ie. Protein, carbs, fibers, sugars and fats). Try to stay within these numbers daily     2. \"7 minute workout\" to help with exercise/activity which takes 7 minutes of your day and that you can do at home! 3. \"Calm\" or \"Headspace\" which helps with mindfulness, meditation, clarity, sleep, and holger to your daily life. 4. [employer] blog for healthy recipe ideas  5. [employer] for low carb resources    HIGH PROTEIN SNACK IDEAS  -cottage cheese  -plain yogurt  -kefir  -hard-boiled eggs  -natural cheeses  -nuts (measure portion size)   -unsweetened nut butters  -dried edamame   -tyler seeds soaked in water or almond milk  -soy nuts  -cured meats (monitor for sodium issues)   -hummus with vegetables  -bean dip with vegetables     FRUIT  Low carb fruit options   Raspberries: Half a cup (60 grams) contains 3 grams of carbs. Blackberries: Half a cup (70 grams) contains 4 grams of carbs. Strawberries: Half a cup (100 grams) contains 6 grams of carbs. Blueberries: Half a cup (50 grams) contains 6 grams of carbs. Plum: One medium-sized (80 grams) contains 6 grams of carbs.      VEGETABLES  Low carb vegetables

## 2022-04-28 NOTE — TELEPHONE ENCOUNTER
From: Delaney Deutsch  To: GANESH Shaver  Sent: 4/28/2022 12:01 PM CDT  Subject: Saxenda/wegovy coverage     Neither of them are covered.  They said that you could send a coverage exception request to get it covered

## 2022-05-02 ENCOUNTER — TELEPHONE (OUTPATIENT)
Dept: INTERNAL MEDICINE CLINIC | Facility: CLINIC | Age: 37
End: 2022-05-02

## 2022-05-02 NOTE — TELEPHONE ENCOUNTER
PA needed for Greg, Following information obtaine [facility].   Pt ID- [id number]  PA to [phone]

## 2022-05-04 ENCOUNTER — NURSE ONLY (OUTPATIENT)
Dept: HEMATOLOGY/ONCOLOGY | Facility: HOSPITAL | Age: 37
End: 2022-05-04
Attending: SPECIALIST
Payer: COMMERCIAL

## 2022-05-04 DIAGNOSIS — D50.8 IRON DEFICIENCY ANEMIA REFRACTORY TO IRON THERAPY: Primary | ICD-10-CM

## 2022-05-04 DIAGNOSIS — D50.0 IRON DEFICIENCY ANEMIA SECONDARY TO BLOOD LOSS (CHRONIC): ICD-10-CM

## 2022-05-04 LAB
BASOPHILS # BLD AUTO: 0.05 X10(3) UL (ref 0–0.2)
BASOPHILS NFR BLD AUTO: 0.5 %
DEPRECATED HBV CORE AB SER IA-ACNC: 27 NG/ML
EOSINOPHIL # BLD AUTO: 0.29 X10(3) UL (ref 0–0.7)
EOSINOPHIL NFR BLD AUTO: 2.9 %
ERYTHROCYTE [DISTWIDTH] IN BLOOD BY AUTOMATED COUNT: 25 %
HCT VFR BLD AUTO: 37.3 %
HGB BLD-MCNC: 11.5 G/DL
IMM GRANULOCYTES # BLD AUTO: 0.03 X10(3) UL (ref 0–1)
IMM GRANULOCYTES NFR BLD: 0.3 %
IRON SATN MFR SERPL: 8 %
IRON SERPL-MCNC: 30 UG/DL
LYMPHOCYTES # BLD AUTO: 3.57 X10(3) UL (ref 1–4)
LYMPHOCYTES NFR BLD AUTO: 35.7 %
MCH RBC QN AUTO: 24.3 PG (ref 26–34)
MCHC RBC AUTO-ENTMCNC: 30.8 G/DL (ref 31–37)
MCV RBC AUTO: 78.9 FL
MONOCYTES # BLD AUTO: 0.5 X10(3) UL (ref 0.1–1)
MONOCYTES NFR BLD AUTO: 5 %
NEUTROPHILS # BLD AUTO: 5.57 X10 (3) UL (ref 1.5–7.7)
NEUTROPHILS # BLD AUTO: 5.57 X10(3) UL (ref 1.5–7.7)
NEUTROPHILS NFR BLD AUTO: 55.6 %
PLATELET # BLD AUTO: 319 10(3)UL (ref 150–450)
PLATELET MORPHOLOGY: NORMAL
RBC # BLD AUTO: 4.73 X10(6)UL
TIBC SERPL-MCNC: 370 UG/DL (ref 240–450)
TRANSFERRIN SERPL-MCNC: 248 MG/DL (ref 200–360)
WBC # BLD AUTO: 10 X10(3) UL (ref 4–11)

## 2022-05-04 PROCEDURE — 83550 IRON BINDING TEST: CPT

## 2022-05-04 PROCEDURE — 82728 ASSAY OF FERRITIN: CPT

## 2022-05-04 PROCEDURE — 83540 ASSAY OF IRON: CPT

## 2022-05-04 PROCEDURE — 36415 COLL VENOUS BLD VENIPUNCTURE: CPT

## 2022-05-04 PROCEDURE — 85025 COMPLETE CBC W/AUTO DIFF WBC: CPT

## 2022-05-11 NOTE — TELEPHONE ENCOUNTER
Abisai Ricci denied coverage because she has no coverage for weight loss drugs.   How do you want to proceed

## 2022-05-11 NOTE — TELEPHONE ENCOUNTER
Ok please notify patient and tell her to finish the sample and we can discuss further of other medications with next office appt

## 2022-06-02 ENCOUNTER — OFFICE VISIT (OUTPATIENT)
Dept: HEMATOLOGY/ONCOLOGY | Facility: HOSPITAL | Age: 37
End: 2022-06-02
Attending: SPECIALIST
Payer: COMMERCIAL

## 2022-06-02 VITALS
HEART RATE: 71 BPM | RESPIRATION RATE: 18 BRPM | DIASTOLIC BLOOD PRESSURE: 102 MMHG | BODY MASS INDEX: 42 KG/M2 | TEMPERATURE: 98 F | SYSTOLIC BLOOD PRESSURE: 189 MMHG | WEIGHT: 253.63 LBS | OXYGEN SATURATION: 100 %

## 2022-06-02 DIAGNOSIS — D50.0 IRON DEFICIENCY ANEMIA SECONDARY TO BLOOD LOSS (CHRONIC): Primary | ICD-10-CM

## 2022-06-02 DIAGNOSIS — D50.8 IRON DEFICIENCY ANEMIA REFRACTORY TO IRON THERAPY: ICD-10-CM

## 2022-06-02 PROCEDURE — 96365 THER/PROPH/DIAG IV INF INIT: CPT

## 2022-06-02 NOTE — PROGRESS NOTES
Education Record    Learner:  Patient    Disease / Diagnosis: Infed infusion    Barriers / Limitations:  None   Comments:    Method:  Brief focused and Reinforcement   Comments:    General Topics:  Diet, Medication, Side effects and symptom management and Plan of care reviewed   Comments:    Outcome:  Shows understanding   Comments: Infed infused without any complications. Observed for half hour after infusion. Vital signs taken at the end of the 30-minute observation. Patient denied any complaints/issues. Discharged in good condition. Advised to call for any questions/concerns/issues.

## 2022-06-09 ENCOUNTER — HOSPITAL ENCOUNTER (EMERGENCY)
Facility: HOSPITAL | Age: 37
Discharge: HOME | End: 2022-06-09
Attending: EMERGENCY MEDICINE
Payer: COMMERCIAL

## 2022-06-09 ENCOUNTER — OFFICE VISIT (OUTPATIENT)
Dept: INTERNAL MEDICINE CLINIC | Facility: CLINIC | Age: 37
End: 2022-06-09
Payer: COMMERCIAL

## 2022-06-09 VITALS
HEART RATE: 84 BPM | OXYGEN SATURATION: 100 % | BODY MASS INDEX: 41.99 KG/M2 | RESPIRATION RATE: 15 BRPM | HEIGHT: 65 IN | SYSTOLIC BLOOD PRESSURE: 158 MMHG | DIASTOLIC BLOOD PRESSURE: 110 MMHG | WEIGHT: 252 LBS

## 2022-06-09 VITALS
TEMPERATURE: 98 F | OXYGEN SATURATION: 98 % | SYSTOLIC BLOOD PRESSURE: 158 MMHG | RESPIRATION RATE: 12 BRPM | HEART RATE: 84 BPM | DIASTOLIC BLOOD PRESSURE: 99 MMHG

## 2022-06-09 DIAGNOSIS — B96.89 BACTERIAL VAGINOSIS: Primary | ICD-10-CM

## 2022-06-09 DIAGNOSIS — E66.01 MORBID OBESITY (HCC): ICD-10-CM

## 2022-06-09 DIAGNOSIS — R06.83 SNORING: ICD-10-CM

## 2022-06-09 DIAGNOSIS — I10 BENIGN ESSENTIAL HYPERTENSION: ICD-10-CM

## 2022-06-09 DIAGNOSIS — Z99.89 OSA ON CPAP: ICD-10-CM

## 2022-06-09 DIAGNOSIS — F41.9 ANXIETY AND DEPRESSION: ICD-10-CM

## 2022-06-09 DIAGNOSIS — Z51.81 THERAPEUTIC DRUG MONITORING: Primary | ICD-10-CM

## 2022-06-09 DIAGNOSIS — Z20.2 POSSIBLE EXPOSURE TO STD: ICD-10-CM

## 2022-06-09 DIAGNOSIS — F32.A ANXIETY AND DEPRESSION: ICD-10-CM

## 2022-06-09 DIAGNOSIS — N76.0 BACTERIAL VAGINOSIS: Primary | ICD-10-CM

## 2022-06-09 DIAGNOSIS — G47.33 OSA ON CPAP: ICD-10-CM

## 2022-06-09 DIAGNOSIS — R30.0 DYSURIA: ICD-10-CM

## 2022-06-09 LAB
B-HCG UR QL: NEGATIVE
BILIRUB UR QL STRIP.AUTO: NEGATIVE
COLOR UR AUTO: YELLOW
GLUCOSE UR STRIP.AUTO-MCNC: NEGATIVE MG/DL
KETONES UR STRIP.AUTO-MCNC: NEGATIVE MG/DL
LEUKOCYTE ESTERASE UR QL STRIP.AUTO: NEGATIVE
NITRITE UR QL STRIP.AUTO: NEGATIVE
PH UR STRIP.AUTO: 6 [PH] (ref 5–8)
PROT UR STRIP.AUTO-MCNC: 100 MG/DL
RBC UR QL AUTO: NEGATIVE
SP GR UR STRIP.AUTO: 1.02 (ref 1–1.03)
UROBILINOGEN UR STRIP.AUTO-MCNC: <2 MG/DL

## 2022-06-09 PROCEDURE — 87510 GARDNER VAG DNA DIR PROBE: CPT | Performed by: EMERGENCY MEDICINE

## 2022-06-09 PROCEDURE — 3077F SYST BP >= 140 MM HG: CPT | Performed by: NURSE PRACTITIONER

## 2022-06-09 PROCEDURE — 81001 URINALYSIS AUTO W/SCOPE: CPT | Performed by: EMERGENCY MEDICINE

## 2022-06-09 PROCEDURE — 99283 EMERGENCY DEPT VISIT LOW MDM: CPT

## 2022-06-09 PROCEDURE — 87591 N.GONORRHOEAE DNA AMP PROB: CPT | Performed by: EMERGENCY MEDICINE

## 2022-06-09 PROCEDURE — 87660 TRICHOMONAS VAGIN DIR PROBE: CPT | Performed by: EMERGENCY MEDICINE

## 2022-06-09 PROCEDURE — 96372 THER/PROPH/DIAG INJ SC/IM: CPT

## 2022-06-09 PROCEDURE — 87480 CANDIDA DNA DIR PROBE: CPT | Performed by: EMERGENCY MEDICINE

## 2022-06-09 PROCEDURE — 3080F DIAST BP >= 90 MM HG: CPT | Performed by: NURSE PRACTITIONER

## 2022-06-09 PROCEDURE — 87491 CHLMYD TRACH DNA AMP PROBE: CPT | Performed by: EMERGENCY MEDICINE

## 2022-06-09 PROCEDURE — 81025 URINE PREGNANCY TEST: CPT

## 2022-06-09 PROCEDURE — 3008F BODY MASS INDEX DOCD: CPT | Performed by: NURSE PRACTITIONER

## 2022-06-09 PROCEDURE — 99214 OFFICE O/P EST MOD 30 MIN: CPT | Performed by: NURSE PRACTITIONER

## 2022-06-09 RX ORDER — LIRAGLUTIDE 6 MG/ML
3 INJECTION, SOLUTION SUBCUTANEOUS DAILY
Qty: 45 ML | Refills: 0 | Status: SHIPPED | OUTPATIENT
Start: 2022-06-09

## 2022-06-09 RX ORDER — METRONIDAZOLE 500 MG/1
500 TABLET ORAL 3 TIMES DAILY
Qty: 21 TABLET | Refills: 0 | Status: SHIPPED | OUTPATIENT
Start: 2022-06-09 | End: 2022-06-16

## 2022-06-09 RX ORDER — LIRAGLUTIDE 6 MG/ML
3 INJECTION, SOLUTION SUBCUTANEOUS DAILY
Qty: 15 ML | Refills: 1 | Status: CANCELLED | OUTPATIENT
Start: 2022-06-09

## 2022-06-09 RX ORDER — LOSARTAN POTASSIUM 50 MG/1
50 TABLET ORAL DAILY
Status: DISCONTINUED | OUTPATIENT
Start: 2022-06-09 | End: 2022-06-09

## 2022-06-09 RX ORDER — HYDROCHLOROTHIAZIDE 12.5 MG/1
12.5 CAPSULE, GELATIN COATED ORAL DAILY
Status: DISCONTINUED | OUTPATIENT
Start: 2022-06-09 | End: 2022-06-09

## 2022-06-09 RX ORDER — DOXYCYCLINE HYCLATE 100 MG/1
100 CAPSULE ORAL 2 TIMES DAILY
Qty: 14 CAPSULE | Refills: 0 | Status: SHIPPED | OUTPATIENT
Start: 2022-06-09 | End: 2022-06-16

## 2022-06-09 NOTE — ED INITIAL ASSESSMENT (HPI)
Pt c/o of pelvic pain over the last two days. With painful urination, increased vaginal discharge.   Also reports having recent unprotected sex with new partner

## 2022-06-09 NOTE — PATIENT INSTRUCTIONS
We are here to support you with weight loss, but please remember that you still need your primary care provider for your routine health maintenance. PLAN:  Will continue with saxenda 3mg daily   Will send message to PCP about BP. Please make sure to take BP medication daily  Follow up with me in 5-6 weeks  Schedule follow up appointments: Evelia Mckay (dietitian) or Andriy Chappell (presurgery dietitian)   Check for insurance coverage for dietitian and labwork prior to scheduling appointment. Please try to work on the following dietary changes:  1. Goals: Aim for 20-30 grams of protein/ meal  i. Aim for 130 grams of carbohydrates/day  ii. Eat 4-6 vegetables/day  iii. Avoid skipping meals- eat every 4-5 hours  iv. Aim for 3 meals/day  2. Drink lots of water and cut down on soda/juice consumption if soda/juice drinker  3. Focus on protein: (15-30 grams with each meal) ie. greek yogurt, cottage cheese, string cheese, hard boiled eggs  4. Healthy snacks: peanut butter and apples, hummus and carrots, berries, nuts (1/4 cup), tuna and crackers                 Protein Shakes: Premier protein or Core Power                Protein Bars: Rx Bars, Oatmega, Power Crunch                 Sargento balanced breaks (cheese and nuts)- without chocolate  5. Reduce carbohydrates which includes sweets as well as rice, pasta, potatoes, bread, corn and instead choose whole grain options or more protein or vegetables (4-6 servings of vegetables per day)  6. Get a good night of sleep  7. Try to decrease stress in life     Please download apps:  1. \"My Fitness Pal\" (other option is Lose it)) to help you to monitor daily dietary intake and you will be able to see if you are eating the right amount of calories, protein, carbs                With My Fitness Pal-->When you set-up the courtney or need to adjust settings:                Goals should include:                  Lose 1.5-2 lbs per week                Activity level: not very active (can't count exercise towards calorie number per day)                   ** Daily INPUT> Look at nutrition section-- \"nutrients\" and it will break down your macros for the day (ie. Protein, carbs, fibers, sugars and fats). Try to stay within these numbers daily     2. \"7 minute workout\" to help with exercise/activity which takes 7 minutes of your day and that you can do at home! 3. \"Calm\" or \"Headspace\" which helps with mindfulness, meditation, clarity, sleep, and holger to your daily life. 4. SkillHound blog for healthy recipe ideas  5. Swift Identity for low carb resources    HIGH PROTEIN SNACK IDEAS  -cottage cheese  -plain yogurt  -kefir  -hard-boiled eggs  -natural cheeses  -nuts (measure portion size)   -unsweetened nut butters  -dried edamame   -tyler seeds soaked in water or almond milk  -soy nuts  -cured meats (monitor for sodium issues)   -hummus with vegetables  -bean dip with vegetables     FRUIT  Low carb fruit options   Raspberries: Half a cup (60 grams) contains 3 grams of carbs. Blackberries: Half a cup (70 grams) contains 4 grams of carbs. Strawberries: Half a cup (100 grams) contains 6 grams of carbs. Blueberries: Half a cup (50 grams) contains 6 grams of carbs. Plum: One medium-sized (80 grams) contains 6 grams of carbs.      VEGETABLES  Low carb vegetables

## 2022-06-10 ENCOUNTER — TELEPHONE (OUTPATIENT)
Dept: INTERNAL MEDICINE CLINIC | Facility: CLINIC | Age: 37
End: 2022-06-10

## 2022-06-10 LAB
C TRACH DNA SPEC QL NAA+PROBE: NEGATIVE
N GONORRHOEA DNA SPEC QL NAA+PROBE: NEGATIVE

## 2022-06-10 NOTE — TELEPHONE ENCOUNTER
PA needed for Saxenda  800 21   ID [id number]  Will try to initiate in [address] [place] [address] Rd

## 2022-06-13 ENCOUNTER — TELEPHONE (OUTPATIENT)
Dept: FAMILY MEDICINE CLINIC | Facility: CLINIC | Age: 37
End: 2022-06-13

## 2022-06-13 NOTE — TELEPHONE ENCOUNTER
Spoke to pt and scheduled appt 6/17/22 at 8am  Pt states she has not checked her BP at home. She denies CP, SOB, headache, vision changes or dizziness. Feels fine   She states just before her appt at weight loss clinic she was at a boot camp exercise class   Pt advised to call if any symptoms or go to ER if CP, SOB  Patient voiced understanding.

## 2022-06-13 NOTE — PROGRESS NOTES
Received note from weight loss clinic that her BP was high again. Please call pt to schedule a f-u HTN appt, in case we need adjust her meds. Thanks.

## 2022-06-13 NOTE — TELEPHONE ENCOUNTER
Author: Jose Tesfaye MD Service: Maurice Arzola Type: Physician   Filed: 6/13/2022 12:26 PM Encounter Date: 6/9/2022 Status: Signed   : Jose Tesfaye MD (Physician)        Received note from weight loss clinic that her BP was high again. Please call pt to schedule a f-u HTN appt, in case we need adjust her meds. Thanks.

## 2022-06-29 PROBLEM — D64.9 ANEMIA: Status: RESOLVED | Noted: 2018-05-21 | Resolved: 2022-06-29

## 2022-06-29 PROBLEM — D50.9 IRON DEFICIENCY ANEMIA: Status: RESOLVED | Noted: 2021-02-19 | Resolved: 2022-06-29

## 2022-07-13 ENCOUNTER — TELEPHONE (OUTPATIENT)
Dept: INTERNAL MEDICINE CLINIC | Facility: CLINIC | Age: 37
End: 2022-07-13

## 2022-08-10 DIAGNOSIS — E66.01 CLASS 3 SEVERE OBESITY DUE TO EXCESS CALORIES WITHOUT SERIOUS COMORBIDITY WITH BODY MASS INDEX (BMI) OF 50.0 TO 59.9 IN ADULT (HCC): ICD-10-CM

## 2022-08-10 RX ORDER — PEN NEEDLE, DIABETIC 32GX 5/32"
NEEDLE, DISPOSABLE MISCELLANEOUS
Qty: 100 EACH | Refills: 0 | OUTPATIENT
Start: 2022-08-10

## 2022-10-29 ENCOUNTER — APPOINTMENT (OUTPATIENT)
Dept: ULTRASOUND IMAGING | Age: 37
End: 2022-10-29
Attending: EMERGENCY MEDICINE
Payer: COMMERCIAL

## 2022-10-29 ENCOUNTER — HOSPITAL ENCOUNTER (EMERGENCY)
Age: 37
Discharge: HOME | End: 2022-10-29
Attending: EMERGENCY MEDICINE
Payer: COMMERCIAL

## 2022-10-29 ENCOUNTER — APPOINTMENT (OUTPATIENT)
Dept: GENERAL RADIOLOGY | Age: 37
End: 2022-10-29
Attending: EMERGENCY MEDICINE
Payer: COMMERCIAL

## 2022-10-29 VITALS
BODY MASS INDEX: 42.68 KG/M2 | TEMPERATURE: 98 F | HEART RATE: 74 BPM | SYSTOLIC BLOOD PRESSURE: 165 MMHG | HEIGHT: 64 IN | WEIGHT: 250 LBS | OXYGEN SATURATION: 99 % | DIASTOLIC BLOOD PRESSURE: 97 MMHG | RESPIRATION RATE: 17 BRPM

## 2022-10-29 DIAGNOSIS — I10 HYPERTENSION, UNSPECIFIED TYPE: Primary | ICD-10-CM

## 2022-10-29 DIAGNOSIS — R60.0 PEDAL EDEMA: ICD-10-CM

## 2022-10-29 LAB
ALBUMIN SERPL-MCNC: 3.6 G/DL (ref 3.4–5)
ALBUMIN/GLOB SERPL: 0.9 {RATIO} (ref 1–2)
ALP LIVER SERPL-CCNC: 42 U/L
ALT SERPL-CCNC: 19 U/L
ANION GAP SERPL CALC-SCNC: 4 MMOL/L (ref 0–18)
AST SERPL-CCNC: 10 U/L (ref 15–37)
ATRIAL RATE: 79 BPM
BASOPHILS # BLD AUTO: 0.04 X10(3) UL (ref 0–0.2)
BASOPHILS NFR BLD AUTO: 0.5 %
BILIRUB SERPL-MCNC: 0.2 MG/DL (ref 0.1–2)
BUN BLD-MCNC: 16 MG/DL (ref 7–18)
CALCIUM BLD-MCNC: 8.9 MG/DL (ref 8.5–10.1)
CHLORIDE SERPL-SCNC: 110 MMOL/L (ref 98–112)
CO2 SERPL-SCNC: 27 MMOL/L (ref 21–32)
CREAT BLD-MCNC: 1.17 MG/DL
EOSINOPHIL # BLD AUTO: 0.27 X10(3) UL (ref 0–0.7)
EOSINOPHIL NFR BLD AUTO: 3.2 %
ERYTHROCYTE [DISTWIDTH] IN BLOOD BY AUTOMATED COUNT: 14 %
GFR SERPLBLD BASED ON 1.73 SQ M-ARVRAT: 62 ML/MIN/1.73M2 (ref 60–?)
GLOBULIN PLAS-MCNC: 3.9 G/DL (ref 2.8–4.4)
GLUCOSE BLD-MCNC: 86 MG/DL (ref 70–99)
HCT VFR BLD AUTO: 37.8 %
HGB BLD-MCNC: 11.9 G/DL
IMM GRANULOCYTES # BLD AUTO: 0.02 X10(3) UL (ref 0–1)
IMM GRANULOCYTES NFR BLD: 0.2 %
LYMPHOCYTES # BLD AUTO: 2.77 X10(3) UL (ref 1–4)
LYMPHOCYTES NFR BLD AUTO: 32.8 %
MCH RBC QN AUTO: 26.3 PG (ref 26–34)
MCHC RBC AUTO-ENTMCNC: 31.5 G/DL (ref 31–37)
MCV RBC AUTO: 83.4 FL
MONOCYTES # BLD AUTO: 0.69 X10(3) UL (ref 0.1–1)
MONOCYTES NFR BLD AUTO: 8.2 %
NEUTROPHILS # BLD AUTO: 4.65 X10 (3) UL (ref 1.5–7.7)
NEUTROPHILS # BLD AUTO: 4.65 X10(3) UL (ref 1.5–7.7)
NEUTROPHILS NFR BLD AUTO: 55.1 %
NT-PROBNP SERPL-MCNC: 50 PG/ML (ref ?–125)
OSMOLALITY SERPL CALC.SUM OF ELEC: 292 MOSM/KG (ref 275–295)
P AXIS: 20 DEGREES
P-R INTERVAL: 202 MS
PLATELET # BLD AUTO: 282 10(3)UL (ref 150–450)
POTASSIUM SERPL-SCNC: 3.9 MMOL/L (ref 3.5–5.1)
PROT SERPL-MCNC: 7.5 G/DL (ref 6.4–8.2)
Q-T INTERVAL: 394 MS
QRS DURATION: 100 MS
QTC CALCULATION (BEZET): 451 MS
R AXIS: 35 DEGREES
RBC # BLD AUTO: 4.53 X10(6)UL
SODIUM SERPL-SCNC: 141 MMOL/L (ref 136–145)
T AXIS: 32 DEGREES
TROPONIN I HIGH SENSITIVITY: 9 NG/L
VENTRICULAR RATE: 79 BPM
WBC # BLD AUTO: 8.4 X10(3) UL (ref 4–11)

## 2022-10-29 PROCEDURE — 80053 COMPREHEN METABOLIC PANEL: CPT | Performed by: EMERGENCY MEDICINE

## 2022-10-29 PROCEDURE — 93005 ELECTROCARDIOGRAM TRACING: CPT

## 2022-10-29 PROCEDURE — 99285 EMERGENCY DEPT VISIT HI MDM: CPT

## 2022-10-29 PROCEDURE — 93970 EXTREMITY STUDY: CPT | Performed by: EMERGENCY MEDICINE

## 2022-10-29 PROCEDURE — 71045 X-RAY EXAM CHEST 1 VIEW: CPT | Performed by: EMERGENCY MEDICINE

## 2022-10-29 PROCEDURE — 84484 ASSAY OF TROPONIN QUANT: CPT | Performed by: EMERGENCY MEDICINE

## 2022-10-29 PROCEDURE — 83880 ASSAY OF NATRIURETIC PEPTIDE: CPT | Performed by: EMERGENCY MEDICINE

## 2022-10-29 PROCEDURE — 93010 ELECTROCARDIOGRAM REPORT: CPT

## 2022-10-29 PROCEDURE — 96374 THER/PROPH/DIAG INJ IV PUSH: CPT

## 2022-10-29 PROCEDURE — 85025 COMPLETE CBC W/AUTO DIFF WBC: CPT | Performed by: EMERGENCY MEDICINE

## 2022-10-29 RX ORDER — LOSARTAN POTASSIUM 50 MG/1
50 TABLET ORAL DAILY
Qty: 30 TABLET | Refills: 0 | Status: SHIPPED | OUTPATIENT
Start: 2022-10-29

## 2022-10-29 RX ORDER — LABETALOL HYDROCHLORIDE 5 MG/ML
20 INJECTION, SOLUTION INTRAVENOUS ONCE
Status: COMPLETED | OUTPATIENT
Start: 2022-10-29 | End: 2022-10-29

## 2022-10-29 RX ORDER — HYDROCHLOROTHIAZIDE 12.5 MG/1
12.5 CAPSULE, GELATIN COATED ORAL DAILY
Qty: 30 CAPSULE | Refills: 0 | Status: SHIPPED | OUTPATIENT
Start: 2022-10-29

## 2022-10-29 RX ORDER — LABETALOL HYDROCHLORIDE 5 MG/ML
20 INJECTION, SOLUTION INTRAVENOUS ONCE
Status: DISCONTINUED | OUTPATIENT
Start: 2022-10-29 | End: 2022-10-29

## 2022-10-29 NOTE — ED INITIAL ASSESSMENT (HPI)
Patient reports lower leg and ankle swelling - worse through the day and improves overnight - also c/o mild fatigue and headache - patient has not been on bp meds in month

## 2022-10-29 NOTE — DISCHARGE INSTRUCTIONS
Take the medications as prescribed take the hydrochlorothiazide for about 1 week and you should recheck with your primary care physician in regarding the swelling in your legs and if your blood pressure is normal you may be able to discontinue this and just continue the losartan. But discussed this with and follow-up with your primary care physician.

## 2022-10-31 NOTE — PROGRESS NOTES
Uncontrolled hypertension-medications renewed by ER but needs follow-up in the office in the next couple weeks to recheck blood pressure and continuity of care. Please inform patient that she should be seen every 6 months if blood pressures controlled and lab work should be ordered. If blood pressure is uncontrolled then she may see a physician almost monthly until it is.     Thank you

## 2022-11-30 ENCOUNTER — HOSPITAL ENCOUNTER (EMERGENCY)
Age: 37
Discharge: HOME | End: 2022-11-30
Attending: EMERGENCY MEDICINE
Payer: COMMERCIAL

## 2022-11-30 ENCOUNTER — APPOINTMENT (OUTPATIENT)
Dept: CT IMAGING | Age: 37
End: 2022-11-30
Attending: EMERGENCY MEDICINE
Payer: COMMERCIAL

## 2022-11-30 VITALS
HEIGHT: 64 IN | BODY MASS INDEX: 44.39 KG/M2 | TEMPERATURE: 98 F | SYSTOLIC BLOOD PRESSURE: 189 MMHG | RESPIRATION RATE: 16 BRPM | DIASTOLIC BLOOD PRESSURE: 106 MMHG | HEART RATE: 74 BPM | OXYGEN SATURATION: 98 % | WEIGHT: 260 LBS

## 2022-11-30 DIAGNOSIS — I10 ESSENTIAL HYPERTENSION: ICD-10-CM

## 2022-11-30 DIAGNOSIS — I88.0 MESENTERIC ADENITIS: Primary | ICD-10-CM

## 2022-11-30 LAB
ALBUMIN SERPL-MCNC: 3.6 G/DL (ref 3.4–5)
ALBUMIN/GLOB SERPL: 0.9 {RATIO} (ref 1–2)
ALP LIVER SERPL-CCNC: 40 U/L
ALT SERPL-CCNC: 15 U/L
ANION GAP SERPL CALC-SCNC: 6 MMOL/L (ref 0–18)
AST SERPL-CCNC: 10 U/L (ref 15–37)
B-HCG UR QL: NEGATIVE
BASOPHILS # BLD AUTO: 0.05 X10(3) UL (ref 0–0.2)
BASOPHILS NFR BLD AUTO: 0.5 %
BILIRUB SERPL-MCNC: 0.3 MG/DL (ref 0.1–2)
BILIRUB UR QL STRIP.AUTO: NEGATIVE
BUN BLD-MCNC: 13 MG/DL (ref 7–18)
CALCIUM BLD-MCNC: 9 MG/DL (ref 8.5–10.1)
CHLORIDE SERPL-SCNC: 107 MMOL/L (ref 98–112)
CLARITY UR REFRACT.AUTO: CLEAR
CO2 SERPL-SCNC: 26 MMOL/L (ref 21–32)
COLOR UR AUTO: YELLOW
CREAT BLD-MCNC: 1.01 MG/DL
EOSINOPHIL # BLD AUTO: 0.29 X10(3) UL (ref 0–0.7)
EOSINOPHIL NFR BLD AUTO: 2.9 %
ERYTHROCYTE [DISTWIDTH] IN BLOOD BY AUTOMATED COUNT: 14.6 %
GFR SERPLBLD BASED ON 1.73 SQ M-ARVRAT: 74 ML/MIN/1.73M2 (ref 60–?)
GLOBULIN PLAS-MCNC: 3.9 G/DL (ref 2.8–4.4)
GLUCOSE BLD-MCNC: 83 MG/DL (ref 70–99)
GLUCOSE UR STRIP.AUTO-MCNC: NEGATIVE MG/DL
HCT VFR BLD AUTO: 35 %
HGB BLD-MCNC: 10.8 G/DL
IMM GRANULOCYTES # BLD AUTO: 0.04 X10(3) UL (ref 0–1)
IMM GRANULOCYTES NFR BLD: 0.4 %
KETONES UR STRIP.AUTO-MCNC: NEGATIVE MG/DL
LEUKOCYTE ESTERASE UR QL STRIP.AUTO: NEGATIVE
LYMPHOCYTES # BLD AUTO: 2.76 X10(3) UL (ref 1–4)
LYMPHOCYTES NFR BLD AUTO: 27.6 %
MCH RBC QN AUTO: 25.2 PG (ref 26–34)
MCHC RBC AUTO-ENTMCNC: 30.9 G/DL (ref 31–37)
MCV RBC AUTO: 81.8 FL
MONOCYTES # BLD AUTO: 0.75 X10(3) UL (ref 0.1–1)
MONOCYTES NFR BLD AUTO: 7.5 %
NEUTROPHILS # BLD AUTO: 6.11 X10 (3) UL (ref 1.5–7.7)
NEUTROPHILS # BLD AUTO: 6.11 X10(3) UL (ref 1.5–7.7)
NEUTROPHILS NFR BLD AUTO: 61.1 %
NITRITE UR QL STRIP.AUTO: NEGATIVE
OSMOLALITY SERPL CALC.SUM OF ELEC: 287 MOSM/KG (ref 275–295)
PH UR STRIP.AUTO: 7.5 [PH] (ref 5–8)
PLATELET # BLD AUTO: 290 10(3)UL (ref 150–450)
POTASSIUM SERPL-SCNC: 3.6 MMOL/L (ref 3.5–5.1)
PROT SERPL-MCNC: 7.5 G/DL (ref 6.4–8.2)
PROT UR STRIP.AUTO-MCNC: NEGATIVE MG/DL
RBC # BLD AUTO: 4.28 X10(6)UL
RBC UR QL AUTO: NEGATIVE
SODIUM SERPL-SCNC: 139 MMOL/L (ref 136–145)
SP GR UR STRIP.AUTO: 1.02 (ref 1–1.03)
UROBILINOGEN UR STRIP.AUTO-MCNC: 0.2 MG/DL
WBC # BLD AUTO: 10 X10(3) UL (ref 4–11)

## 2022-11-30 PROCEDURE — 81025 URINE PREGNANCY TEST: CPT

## 2022-11-30 PROCEDURE — 96374 THER/PROPH/DIAG INJ IV PUSH: CPT | Performed by: EMERGENCY MEDICINE

## 2022-11-30 PROCEDURE — 80053 COMPREHEN METABOLIC PANEL: CPT | Performed by: EMERGENCY MEDICINE

## 2022-11-30 PROCEDURE — 85025 COMPLETE CBC W/AUTO DIFF WBC: CPT | Performed by: EMERGENCY MEDICINE

## 2022-11-30 PROCEDURE — 99284 EMERGENCY DEPT VISIT MOD MDM: CPT | Performed by: EMERGENCY MEDICINE

## 2022-11-30 PROCEDURE — 74176 CT ABD & PELVIS W/O CONTRAST: CPT | Performed by: EMERGENCY MEDICINE

## 2022-11-30 PROCEDURE — 81003 URINALYSIS AUTO W/O SCOPE: CPT | Performed by: EMERGENCY MEDICINE

## 2022-11-30 PROCEDURE — 96375 TX/PRO/DX INJ NEW DRUG ADDON: CPT | Performed by: EMERGENCY MEDICINE

## 2022-11-30 PROCEDURE — 99284 EMERGENCY DEPT VISIT MOD MDM: CPT

## 2022-11-30 RX ORDER — PANTOPRAZOLE SODIUM 20 MG/1
20 TABLET, DELAYED RELEASE ORAL DAILY
Qty: 30 TABLET | Refills: 0 | Status: SHIPPED | OUTPATIENT
Start: 2022-11-30 | End: 2022-12-30

## 2022-11-30 RX ORDER — KETOROLAC TROMETHAMINE 10 MG/1
10 TABLET, FILM COATED ORAL EVERY 6 HOURS PRN
Qty: 30 TABLET | Refills: 0 | Status: SHIPPED | OUTPATIENT
Start: 2022-11-30 | End: 2022-12-07

## 2022-11-30 RX ORDER — ONDANSETRON 4 MG/1
4 TABLET, ORALLY DISINTEGRATING ORAL EVERY 4 HOURS PRN
Qty: 10 TABLET | Refills: 0 | Status: SHIPPED | OUTPATIENT
Start: 2022-11-30 | End: 2022-12-07

## 2022-11-30 RX ORDER — KETOROLAC TROMETHAMINE 30 MG/ML
30 INJECTION, SOLUTION INTRAMUSCULAR; INTRAVENOUS ONCE
Status: COMPLETED | OUTPATIENT
Start: 2022-11-30 | End: 2022-11-30

## 2022-11-30 RX ORDER — ONDANSETRON 2 MG/ML
4 INJECTION INTRAMUSCULAR; INTRAVENOUS ONCE
Status: COMPLETED | OUTPATIENT
Start: 2022-11-30 | End: 2022-11-30

## 2022-11-30 NOTE — DISCHARGE INSTRUCTIONS
Clear liquid diet, take the Toradol Zofran and Protonix as prescribed. Keep a blood pressure log and make an appointment to follow-up with your doctor we may need to change her blood pressure medications.

## 2022-12-03 ENCOUNTER — OFFICE VISIT (OUTPATIENT)
Dept: FAMILY MEDICINE CLINIC | Facility: CLINIC | Age: 37
End: 2022-12-03
Payer: COMMERCIAL

## 2022-12-03 VITALS
DIASTOLIC BLOOD PRESSURE: 100 MMHG | OXYGEN SATURATION: 99 % | HEIGHT: 64 IN | WEIGHT: 274.63 LBS | SYSTOLIC BLOOD PRESSURE: 174 MMHG | BODY MASS INDEX: 46.89 KG/M2 | TEMPERATURE: 98 F | RESPIRATION RATE: 20 BRPM | HEART RATE: 76 BPM

## 2022-12-03 DIAGNOSIS — I10 SEVERE HYPERTENSION: Primary | ICD-10-CM

## 2022-12-03 DIAGNOSIS — I88.0 MESENTERIC ADENITIS: ICD-10-CM

## 2022-12-03 DIAGNOSIS — N92.0 MENORRHAGIA WITH REGULAR CYCLE: ICD-10-CM

## 2022-12-03 PROCEDURE — 3008F BODY MASS INDEX DOCD: CPT | Performed by: FAMILY MEDICINE

## 2022-12-03 PROCEDURE — 3077F SYST BP >= 140 MM HG: CPT | Performed by: FAMILY MEDICINE

## 2022-12-03 PROCEDURE — 99214 OFFICE O/P EST MOD 30 MIN: CPT | Performed by: FAMILY MEDICINE

## 2022-12-03 PROCEDURE — 3080F DIAST BP >= 90 MM HG: CPT | Performed by: FAMILY MEDICINE

## 2022-12-03 RX ORDER — VALSARTAN AND HYDROCHLOROTHIAZIDE 160; 25 MG/1; MG/1
1 TABLET ORAL DAILY
Qty: 30 TABLET | Refills: 1 | Status: SHIPPED | OUTPATIENT
Start: 2022-12-03

## 2022-12-03 RX ORDER — AMOXICILLIN 500 MG/1
TABLET, FILM COATED ORAL
COMMUNITY
Start: 2022-07-10 | End: 2022-12-03 | Stop reason: ALTCHOICE

## 2022-12-26 NOTE — ED NOTES
I reviewed that chart and discussed the case.   I have examined the patient and noted patient is a 31-year-old female who presents emergency with history of recently been placed on antibiotics for a tooth infection and now has some whitish discharge and vag arise. Patient discharged home at this time. I agree with the following clinical impression(s):  Acute vaginal itching    I agree with the plan as noted. Yes

## 2023-02-03 ENCOUNTER — TELEMEDICINE (OUTPATIENT)
Dept: INTERNAL MEDICINE CLINIC | Facility: CLINIC | Age: 38
End: 2023-02-03
Payer: COMMERCIAL

## 2023-02-03 DIAGNOSIS — F41.9 ANXIETY AND DEPRESSION: ICD-10-CM

## 2023-02-03 DIAGNOSIS — F32.A ANXIETY AND DEPRESSION: ICD-10-CM

## 2023-02-03 DIAGNOSIS — I10 BENIGN ESSENTIAL HYPERTENSION: ICD-10-CM

## 2023-02-03 DIAGNOSIS — G47.33 OSA ON CPAP: ICD-10-CM

## 2023-02-03 DIAGNOSIS — R06.83 SNORING: ICD-10-CM

## 2023-02-03 DIAGNOSIS — E66.01 MORBID OBESITY (HCC): ICD-10-CM

## 2023-02-03 DIAGNOSIS — Z99.89 OSA ON CPAP: ICD-10-CM

## 2023-02-03 DIAGNOSIS — Z51.81 THERAPEUTIC DRUG MONITORING: Primary | ICD-10-CM

## 2023-02-03 RX ORDER — SEMAGLUTIDE 0.25 MG/.5ML
0.25 INJECTION, SOLUTION SUBCUTANEOUS WEEKLY
Qty: 2 ML | Refills: 0 | Status: SHIPPED | OUTPATIENT
Start: 2023-02-03 | End: 2023-02-25

## 2023-02-03 NOTE — PATIENT INSTRUCTIONS
Next steps:  1. Fill your prescribed medication and take as discussed and prescribed: wegovy 0.25mg weekly x 4 weeks and then increase to 0.5mg weekly   2. Schedule a personal nutrition consultation with one of our registered dieticians     Please try to work on the following dietary changes:  Daily protein recommendation to start:  grams  Daily carbohydrate: <140g   Daily calories: 1,700  1. Drink water with meals and throughout the day, cut down on soda and/or juice if consumed. Consider flavored water options like Bubbly, Spindrift, Hint and Ines. 2.  Eat breakfast daily and focus on having protein with each meal, examples include: greek yogurt, cottage cheese, hard boiled egg, whole grain toast with peanut butter. 3.  Reduce refined carbohydrates and sugars which includes items such as sweets, as well as rice, pasta, and bread and make sure to choose whole grain options when having them with just 1 serving per meal about the size of your inner palm. 4.  Consume non starchy veggies daily working towards making them a good 50% of your daily food intake. Add them to lunch and dinner consistently. 5.  Start a daily probiotic: VSL#3 is recommended, (order on line at www.vsl3. com). Take 1 capsule daily with water for 30 days, then reduce to 1 every other day (this will reduce the cost). Capsules can be left out for 2 weeks, but then must be refrigerated. Please download courtney My Fitness Young Larson! Or Net Diary to monitor daily dietary intake and you will be able to see if you are eating the right amount of calories or too much or too little which would hinder weight loss. Additionally this will help to see your daily carbohydrate and protein intake. When you set the courtney up choose 1-2 lbs/week as a goal.  Keeping a paper food journal is an option as well to remain accountable for your choices- this is the start to mindful eating!  A low calorie diet has been consistently shown to support weight loss.     Continue or start exercising to help establish a routine. If not already exercising begin with 1 day and progress as able with long-term goal of 30 minutes 5 days a week at a minimum. Meditation daily can help manage and control stress. Chronic stress can make weight loss difficult. Exercising is one way to help with stress, but meditation using the CALM Peggy or another comparable alternative can be done in your home or place of work with little time commitment. This Peggy can also help work on behavior change and improve sleep. Check out the segment under Calm Masterclass and listen to The 4 Pillars of Health. A great way to begin learning about the foundation of lifestyle with practical tips to use in your every day. Check out www.yourweightmatters. org blog for continued daily support and education along this weight loss journey! Patient Resources:     Personal Training/Fitness Classes/Health Coaching     Tricia Mack and Zarate Tobias @ [email or web address]/ Full fitness center with group fitness and personal training. Discount available as client of [facility]. Health Coaching and Personal Training with Stephenie Wing at our [facility]- individual weekly coaching with option to add personal training and small group fitness classes targeted at weight loss- [phone] and/or email @ [email or web address]  360FIT [place] [email or web address] Group Fitness [phone] and/or email Vance Zamudio at [email or web address]  [address] [facility] with multiple locations: Aetna (www.Aperion Biologics. Doormen.), Eat The Frog Fitness (www.Harvard University. Doormen.), Fit Body Bootcamp (www.Ambrxbodybootcamp.Doormen.), Top10.com Fitness (www.WallStrip. Doormen.), The Exercise  (www.exercisecoach.Doormen.)     Online Fitness  Fitness  on Whole Foods in 10 DVD series- www. qizky43JPZ. Doormen.  Sit and Be Fit - Chair exercise series Www.sitandbefit. org  Hip Hop Fit with Chicho Mc at www.hiphopfit. net     Apps for on the Virtual Event Bags 7 Minute Workout (orange box with white 7) - free on the go HIIT training peggy  Peloton Peggy @ Pumant     Nutrition Trackers and Tools  LoseIT! And My Fitness Pal apps and on line for tracking nutrition  NOOM - virtual health coaching  FitFoundation (healthy meals on the go) in [facility] @ www. ipqzlynadoblv0a. Dick Walsh MD @ wwwfarmhoppingbistromdTrusight and Shawnee Hadley (keto and low carb plans recommended) @ www. XLDBQK03.PAD, Metabolic Meals @ www. MyMetabolicMeals. com - individual prepared meals to go  Scotrenewables Tidal Power, Knowledge Factor, International Business Machines, Every Plate, SportsBeat.com- on line meal delivery programs for preparation at home  [place] DDN in [place] for homemade meals to go @ wwwAmanda Huff DBA SecuRecovery. Box  Diet Doctor @ www. dietdoctor. com - low carb swaps  Yummly - meal prep and planning peggy (www.yummly. com)     Stress Management/Behavior/Mindful Eating  CALM meditation peggy (www.calm. Box)  Headspace  Am I Hungry? Mindful eating virtual  peggy  Www.yourweightmatters. org - Obesity Action Coalition sponsored Blog posts daily  Motivation peggy (black box with white \")- daily supportive messages sent to your phone     Books/Video Education/Podcasts  Mindless Eating by Jessica Jiménez  Why We Get Sick by Adilia Murray (a book about insulin resistance)  Atomic Habits by Milo Mejia (a book about taking small steps to promote greater behavior change)   Can't Hurt Me by Eloise Lira (a book exploring the power of discipline in achieving your goals)  The End of Dieting: How to Live for Life by Dr. Zina Hernandez M.D. or listen to The [address] Episode 61: Understanding \"Nutritarian\" Eating w/Dr. Zina Hernandez  Your Body in Balance:  The World Fuel Services Corporation of Food, Hormones, and Health by Dr. Arpita Mcwilliams  The Menopause Diet Plan by Rakan [facility]OSP AT [facility]  The Complete Guide to fasting by Dr. Donn Garner, Salt & Fat by Ravindra Mccarty, Ph.D, R.D. Weight Loss Surgery Will Not Treat Food Addiction by Monse Nieves Ph.D  The [address] [place] [address] on plant based nutrition  Fed Up - documentary about obesity (Free on [place])  The Truth About Sugar - documentary on sugar (Free on Utube, https://youtu. be/5K1bvxfTL5i)  The Dr. Hesham Power by Dr. Sammy Cain MD  Fitlosophy Fitspiration - journal to better health (found at [employer] in fitness aisle)  What Happened to You?- a look at the impact trauma has on behavior written by Neelam Zarate and Dr. Marilu Lundberg Again by Mulu Estrada - discovering your true self after trauma  Funmi Gonsales talk on OncoGenex, The Call to Courage  Podcasts: The Exam Room by the Physician's Committee, Nutrition Facts by Dr. Ramona Lei    We are here to support you with weight loss, but please remember that you still need your primary care provider for your routine health maintenance.

## 2023-02-16 NOTE — LETTER
07/[birth year]19        [address] [place] [id number]      Dear Raphael Credit records indicate that you have outstanding lab work and or testing that was ordered for you and has not yet been completed:  Orders Placed Th What Type Of Note Output Would You Prefer (Optional)?: Standard Output Hpi Title: Evaluation of Skin Lesions How Severe Are Your Spot(S)?: mild Have Your Spot(S) Been Treated In The Past?: has not been treated Additional History: \\nSpot on Rt lower eyelid \\nSpot on Rt lateral forehead

## 2023-02-24 RX ORDER — SEMAGLUTIDE 0.25 MG/.5ML
0.25 INJECTION, SOLUTION SUBCUTANEOUS WEEKLY
Qty: 2 ML | Refills: 0 | Status: CANCELLED | OUTPATIENT
Start: 2023-02-24 | End: 2023-03-18

## 2023-02-26 NOTE — TELEPHONE ENCOUNTER
Requesting Wegovy increase  LOV: 2/3/23  RTC: 7 weeks  Last Relevant Labs: na  Filled: 2/3/23 #2ml with 0 refills wegovy 0.25 mg    Future Appointments   Date Time Provider Pepito Cohn   3/16/2023 10:40 AM Gilmer Corona MD EM[facility] 30 [facility] [place]

## 2023-03-13 NOTE — TELEPHONE ENCOUNTER
Requesting wegovy  LOV: 2/3/23  RTC:8 weeks   Last Relevant Labs:   Filled: 2ml on 2/27/23 # with 0 refills    Future Appointments   Date Time Provider Pepito Cohn   3/16/2023 10:40 AM Shantel Weir MD EM[facility] 30 [facility] [place]   8/21/2023  8:00 AM GANESH Barrett EMGWEI [facility] [address]     Ready to move up

## 2023-03-16 ENCOUNTER — TELEPHONE (OUTPATIENT)
Dept: FAMILY MEDICINE CLINIC | Facility: CLINIC | Age: 38
End: 2023-03-16

## 2023-03-16 NOTE — TELEPHONE ENCOUNTER
I spoke with patient regarding NO SHOW status for office visit on 03/16/2023 with Dr. Rahat Wakefield. Informed patient our office has a $86.71 NO SHOW FEE POLICY so we ask that you call at least 24 hours before your appt time. Informed patient can also use my-chart to cancel appt before 24 hours before your appointment. Patient will be charged $40.00 for any NO SHOW appointments. Patient verbalized understanding and agrees.

## 2023-04-16 ENCOUNTER — PATIENT MESSAGE (OUTPATIENT)
Dept: INTERNAL MEDICINE CLINIC | Facility: CLINIC | Age: 38
End: 2023-04-16

## 2023-04-16 DIAGNOSIS — I10 SEVERE HYPERTENSION: ICD-10-CM

## 2023-04-16 NOTE — TELEPHONE ENCOUNTER
Requesting wegovy increase  LOV: 2/3/23  RTC: 7 weeks  Last Relevant Labs: na  Filled: 3/13/23 #2ml with 0 refills wegovy 1 mg    Future Appointments   Date Time Provider Pepito Cohn   8/21/2023  8:00 AM GANESH Sanchez EMGGRETAI [facility] [address]

## 2023-04-17 RX ORDER — VALSARTAN AND HYDROCHLOROTHIAZIDE 160; 25 MG/1; MG/1
1 TABLET ORAL DAILY
Qty: 30 TABLET | Refills: 1 | Status: SHIPPED | OUTPATIENT
Start: 2023-04-17

## 2023-06-17 ENCOUNTER — HOSPITAL ENCOUNTER (EMERGENCY)
Age: 38
Discharge: HOME | End: 2023-06-17
Attending: EMERGENCY MEDICINE
Payer: COMMERCIAL

## 2023-06-17 VITALS
TEMPERATURE: 98 F | DIASTOLIC BLOOD PRESSURE: 117 MMHG | BODY MASS INDEX: 40.63 KG/M2 | WEIGHT: 238 LBS | OXYGEN SATURATION: 100 % | HEIGHT: 64 IN | HEART RATE: 73 BPM | SYSTOLIC BLOOD PRESSURE: 182 MMHG | RESPIRATION RATE: 16 BRPM

## 2023-06-17 DIAGNOSIS — K08.89 PAIN, DENTAL: Primary | ICD-10-CM

## 2023-06-17 PROCEDURE — 99283 EMERGENCY DEPT VISIT LOW MDM: CPT

## 2023-06-17 RX ORDER — NAPROXEN 500 MG/1
500 TABLET ORAL 2 TIMES DAILY PRN
Qty: 20 TABLET | Refills: 0 | Status: SHIPPED | OUTPATIENT
Start: 2023-06-17 | End: 2023-06-27

## 2023-06-17 RX ORDER — AMOXICILLIN 875 MG/1
875 TABLET, COATED ORAL 2 TIMES DAILY
Qty: 14 TABLET | Refills: 0 | Status: SHIPPED | OUTPATIENT
Start: 2023-06-17 | End: 2023-06-24

## 2023-06-17 RX ORDER — HYDROCODONE BITARTRATE AND ACETAMINOPHEN 5; 325 MG/1; MG/1
1 TABLET ORAL EVERY 6 HOURS PRN
Qty: 10 TABLET | Refills: 0 | Status: SHIPPED | OUTPATIENT
Start: 2023-06-17

## 2023-06-18 NOTE — ED INITIAL ASSESSMENT (HPI)
PT to the ED for evaluation of dental pain after a cleaning yesterday. PT took 400 motrin and 1000 tylenol at 1400.

## 2023-06-29 ENCOUNTER — PATIENT MESSAGE (OUTPATIENT)
Dept: INTERNAL MEDICINE CLINIC | Facility: CLINIC | Age: 38
End: 2023-06-29

## 2023-08-05 ENCOUNTER — PATIENT MESSAGE (OUTPATIENT)
Dept: INTERNAL MEDICINE CLINIC | Facility: CLINIC | Age: 38
End: 2023-08-05

## 2023-08-07 NOTE — TELEPHONE ENCOUNTER
Requesting wegovy   LOV: 2/3/23  RTC: 8 weeks   Last Relevant Labs:   Filled: 1.7mg on 7/11/23 # with  Floy Flattery    Future Appointments   Date Time Provider Pepito Cohn   8/21/2023  8:00 AM GANESH Melgar [facility] [address]

## 2023-08-07 NOTE — TELEPHONE ENCOUNTER
From: Sharin Councilman  To: GANESH Neal  Sent: 8/5/2023 4:36 AM CDT  Subject: Jose Caraballo    I need the 2.4 dose sent to my pharmacy. I was able to finish 1.7 with no side effects.

## 2023-08-10 ENCOUNTER — OFFICE VISIT (OUTPATIENT)
Dept: INTERNAL MEDICINE CLINIC | Facility: CLINIC | Age: 38
End: 2023-08-10
Payer: COMMERCIAL

## 2023-08-10 VITALS
HEART RATE: 78 BPM | WEIGHT: 234 LBS | SYSTOLIC BLOOD PRESSURE: 138 MMHG | OXYGEN SATURATION: 98 % | RESPIRATION RATE: 16 BRPM | DIASTOLIC BLOOD PRESSURE: 86 MMHG | BODY MASS INDEX: 38.99 KG/M2 | HEIGHT: 65 IN

## 2023-08-10 DIAGNOSIS — G47.33 OSA ON CPAP: ICD-10-CM

## 2023-08-10 DIAGNOSIS — F41.9 ANXIETY AND DEPRESSION: ICD-10-CM

## 2023-08-10 DIAGNOSIS — Z51.81 THERAPEUTIC DRUG MONITORING: Primary | ICD-10-CM

## 2023-08-10 DIAGNOSIS — Z51.81 THERAPEUTIC DRUG MONITORING: ICD-10-CM

## 2023-08-10 DIAGNOSIS — F32.A ANXIETY AND DEPRESSION: ICD-10-CM

## 2023-08-10 DIAGNOSIS — E66.9 OBESITY (BMI 30-39.9): ICD-10-CM

## 2023-08-10 DIAGNOSIS — I10 BENIGN ESSENTIAL HYPERTENSION: ICD-10-CM

## 2023-08-10 DIAGNOSIS — R06.83 SNORING: ICD-10-CM

## 2023-08-10 PROCEDURE — 3075F SYST BP GE 130 - 139MM HG: CPT | Performed by: NURSE PRACTITIONER

## 2023-08-10 PROCEDURE — 99214 OFFICE O/P EST MOD 30 MIN: CPT | Performed by: NURSE PRACTITIONER

## 2023-08-10 PROCEDURE — 3079F DIAST BP 80-89 MM HG: CPT | Performed by: NURSE PRACTITIONER

## 2023-08-10 PROCEDURE — 3008F BODY MASS INDEX DOCD: CPT | Performed by: NURSE PRACTITIONER

## 2023-08-10 RX ORDER — SEMAGLUTIDE 2.4 MG/.75ML
2.4 INJECTION, SOLUTION SUBCUTANEOUS WEEKLY
Qty: 3 ML | Refills: 2 | OUTPATIENT
Start: 2023-08-10

## 2023-08-10 RX ORDER — SEMAGLUTIDE 2.4 MG/.75ML
2.4 INJECTION, SOLUTION SUBCUTANEOUS WEEKLY
Qty: 3 ML | Refills: 2 | Status: SHIPPED | OUTPATIENT
Start: 2023-08-10

## 2023-08-10 RX ORDER — DICLOFENAC POTASSIUM 50 MG/1
TABLET, FILM COATED ORAL
COMMUNITY
Start: 2023-05-06

## 2023-08-10 NOTE — PATIENT INSTRUCTIONS
Next steps:  1. Fill your prescribed medication and take as discussed and prescribed: wegovy 2.4mg weekly   2. Schedule a personal nutrition consultation with one of our registered dieticians     1. Drink water with meals and throughout the day, cut down on soda and/or juice if consumed. Consider flavored water options like Bubbly, Spindrift, Hint and Ines. 2.  Eat breakfast daily and focus on having protein with each meal, examples include: greek yogurt, cottage cheese, hard boiled egg, whole grain toast with peanut butter. 3.  Reduce refined carbohydrates and sugars which includes items such as sweets, as well as rice, pasta, and bread and make sure to choose whole grain options when having them with just 1 serving per meal about the size of your inner palm. 4.  Consume non starchy veggies daily working towards making them a good 50% of your daily food intake. Add them to lunch and dinner consistently. 5.  Start a daily probiotic: VSL#3 is recommended, (order on line at www.vsl3. com). Take 1 capsule daily with water for 30 days, then reduce to 1 every other day (this will reduce the cost). Capsules can be left out for 2 weeks, but then must be refrigerated. Please download courtney My Fitness Tosha Jose! Or Net Diary to monitor daily dietary intake and you will be able to see if you are eating the right amount of calories or too much or too little which would hinder weight loss. Additionally this will help to see your daily carbohydrate and protein intake. When you set the courtney up choose 1-2 lbs/week as a goal.  Keeping a paper food journal is an option as well to remain accountable for your choices- this is the start to mindful eating! A low calorie diet has been consistently shown to support weight loss. Continue or start exercising to help establish a routine. If not already exercising begin with 1 day and progress as able with long-term goal of 30 minutes 5 days a week at a minimum. Meditation daily can help manage and control stress. Chronic stress can make weight loss difficult. Exercising is one way to help with stress, but meditation using the CALM Peggy or another comparable alternative can be done in your home or place of work with little time commitment. This Peggy can also help work on behavior change and improve sleep. Check out the segment under Calm Masterclass and listen to The 4 Pillars of Health. A great way to begin learning about the foundation of lifestyle with practical tips to use in your every day. Check out www.yourweightmatters. org blog for continued daily support and education along this weight loss journey! Patient Resources:     Personal Training/Fitness Classes/Health Coaching     Tricia 112 and Zarate Sophiaside @ [email or web address]/ Full fitness center with group fitness and personal training. Discount available as client of [facility]. Health Coaching and Personal Training with Milena Thomas at our [facility]- individual weekly coaching with option to add personal training and small group fitness classes targeted at weight loss- [phone] and/or email @ Jarod Butler. [email or web address]  360FIT [place] https://Citymart - Inspiring solutions to transform cities.org/. Group Fitness [phone] and/or email Karel León at [email or web address]  [address] [facility] with multiple locations: Aetna (www.StrikeAd), Eat The "AutoWiser, LLC" Fitness (www.App.io. H2scan), Fit Body Bootcamp (www.Workers On CallbodybootBeijing 100ep.H2scan), Invoiceable (www.Garnet Biotherapeutics), The Exercise  (www.exercisecoach.H2scan)     Online Fitness  Fitness  on Whole Foods in 10 DVD series- www. hgdmb31JKZ. H2scan  Sit and Be Fit - Chair exercise series Www.sitandbefit. org  Hip Hop Fit with Chicho Mc at www.hiphopfit. net     Apps for on the [place]ano 7 Minute Workout (orange box with white 7) - free on the go HIIT training peggy  Peloton Peggy @ www. Ancera     Nutrition Trackers and Tools  LoseIT! And My Fitness Pal apps and on line for tracking nutrition  NOOM - virtual health coaching  FitFoundation (healthy meals on the go) in [facility] @ www. foftvwczevdmf4g. Nai Novak MD @ www.bistromd.com and Rudolph Buitrago (keto and low carb plans recommended) @ www. CGJSMD97.[facility], Metabolic Meals @ www. MyMetabolicMeals. com - individual prepared meals to go  PhotoThera, Xueba100.com, International Business Machines, Every Plate, Neo Networks- on line meal delivery programs for preparation at home  [place] Frameri in [place] for homemade meals to go @ www.Fast Track Asia. Dynis  Diet Doctor @ www. dietdoctor. Dynis - low carb swaps  Yummly - meal prep and planning peggy (www.yummly. com)     Stress Management/Behavior/Mindful Eating  CALM meditation peggy (www.Knowledgestreem)  Headspace  Am I Hungry? Mindful eating virtual  peggy  Www.yourweightmatters. org - Obesity Action Coalition sponsored Blog posts daily  Motivation peggy (black box with white \")- daily supportive messages sent to your phone     Books/Video Education/Podcasts  Mindless Eating by Addy Goldsmithort  Why We Get Sick by Gordon Willson (a book about insulin resistance)  Atomic Habits by Master Kc (a book about taking small steps to promote greater behavior change)   Can't Hurt Me by Lester Marie (a book exploring the power of discipline in achieving your goals)  The End of Dieting: How to Live for Life by Dr. Michelle Salcedo M.D. or listen to The [address] [facility] Episode 61: Understanding \"Nutritarian\" Eating w/Dr. Michelle Salcedo  Your Body in Balance: The World Fuel Services Corporation of Food, Hormones, and Health by Dr. Isela Crawford  The Menopause Diet Plan by David Navarro and [facility] HOSP AT [facility]  The Complete Guide to fasting by Dr. Mac Zaragoza, [facility] by Jigna Damian, Ph.D, R.D.   Weight Loss Surgery Will Not Treat Food Addiction by Xavier Sun Ph.D  The Game Changers- Favorite Words Documentary on plant based nutrition  Fed Up - documentary about obesity (Free on Utube)  The Truth About Sugar - documentary on sugar (Free on Utube, https://youtu. be/3C1xsmgXN8w)  The Dr. Arlo Dakin by Dr. Ghada Jansen MD  Fitlosophy Fitspiration - journal to better health (found at [employer] in fitness aisle)  What Happened to You?- a look at the impact trauma has on behavior written by Denis Liu and Dr. Shivani Gomez Again by Janet Mayo - discovering your true self after trauma  Sonia Michaud talk on C3 Metrics, The Call to Courage  Podcasts: The Exam Room by the Physician's Committee, Nutrition Facts by Dr. Devi De Luna    We are here to support you with weight loss, but please remember that you still need your primary care provider for your routine health maintenance.

## 2023-08-11 ENCOUNTER — PATIENT MESSAGE (OUTPATIENT)
Dept: INTERNAL MEDICINE CLINIC | Facility: CLINIC | Age: 38
End: 2023-08-11

## 2023-10-09 ENCOUNTER — HOSPITAL ENCOUNTER (EMERGENCY)
Age: 38
Discharge: LEFT BEFORE DISCHARGE | End: 2023-10-09
Payer: COMMERCIAL

## 2023-10-09 VITALS
HEIGHT: 64 IN | WEIGHT: 227 LBS | BODY MASS INDEX: 38.76 KG/M2 | HEART RATE: 74 BPM | OXYGEN SATURATION: 100 % | SYSTOLIC BLOOD PRESSURE: 181 MMHG | TEMPERATURE: 97 F | RESPIRATION RATE: 16 BRPM | DIASTOLIC BLOOD PRESSURE: 111 MMHG

## 2023-10-28 DIAGNOSIS — Z51.81 THERAPEUTIC DRUG MONITORING: ICD-10-CM

## 2023-10-28 NOTE — TELEPHONE ENCOUNTER
Requesting   Requested Prescriptions     Pending Prescriptions Disp Refills    semaglutide-weight management (WEGOVY) 2.4 MG/0.75ML Subcutaneous Solution Auto-injector 3 mL 2     Sig: Inject 0.75 mL (2.4 mg total) into the skin once a week.        LOV: 8/10/23  RTC:   Last Relevant Labs:   Filled: 8/10/23 #3ml  with 2 refills    Future Appointments   Date Time Provider [facility]   11/9/2023 10:40 AM Rosana Rowland MD [facility] [place]

## 2023-10-30 RX ORDER — SEMAGLUTIDE 2.4 MG/.75ML
2.4 INJECTION, SOLUTION SUBCUTANEOUS WEEKLY
Qty: 3 ML | Refills: 2 | Status: SHIPPED | OUTPATIENT
Start: 2023-10-30

## 2023-11-09 ENCOUNTER — OFFICE VISIT (OUTPATIENT)
Dept: FAMILY MEDICINE CLINIC | Facility: CLINIC | Age: 38
End: 2023-11-09
Payer: COMMERCIAL

## 2023-11-09 VITALS
DIASTOLIC BLOOD PRESSURE: 84 MMHG | WEIGHT: 218.19 LBS | RESPIRATION RATE: 18 BRPM | OXYGEN SATURATION: 99 % | HEIGHT: 65.23 IN | HEART RATE: 61 BPM | TEMPERATURE: 99 F | BODY MASS INDEX: 35.92 KG/M2 | SYSTOLIC BLOOD PRESSURE: 136 MMHG

## 2023-11-09 DIAGNOSIS — D50.0 IRON DEFICIENCY ANEMIA DUE TO CHRONIC BLOOD LOSS: ICD-10-CM

## 2023-11-09 DIAGNOSIS — Z13.21 SCREENING FOR ENDOCRINE, NUTRITIONAL, METABOLIC AND IMMUNITY DISORDER: ICD-10-CM

## 2023-11-09 DIAGNOSIS — Z13.228 SCREENING FOR ENDOCRINE, NUTRITIONAL, METABOLIC AND IMMUNITY DISORDER: ICD-10-CM

## 2023-11-09 DIAGNOSIS — Z13.0 SCREENING FOR ENDOCRINE, NUTRITIONAL, METABOLIC AND IMMUNITY DISORDER: ICD-10-CM

## 2023-11-09 DIAGNOSIS — Z23 NEED FOR VACCINATION: ICD-10-CM

## 2023-11-09 DIAGNOSIS — Z12.4 CERVICAL CANCER SCREENING: ICD-10-CM

## 2023-11-09 DIAGNOSIS — R41.840 INATTENTION: ICD-10-CM

## 2023-11-09 DIAGNOSIS — I10 BENIGN ESSENTIAL HYPERTENSION: ICD-10-CM

## 2023-11-09 DIAGNOSIS — Z00.00 ANNUAL PHYSICAL EXAM: Primary | ICD-10-CM

## 2023-11-09 DIAGNOSIS — Z13.29 SCREENING FOR ENDOCRINE, NUTRITIONAL, METABOLIC AND IMMUNITY DISORDER: ICD-10-CM

## 2023-11-09 LAB
ALBUMIN SERPL-MCNC: 3.8 G/DL (ref 3.4–5)
ALBUMIN/GLOB SERPL: 1 {RATIO} (ref 1–2)
ALP LIVER SERPL-CCNC: 39 U/L
ALT SERPL-CCNC: 15 U/L
ANION GAP SERPL CALC-SCNC: 2 MMOL/L (ref 0–18)
AST SERPL-CCNC: 12 U/L (ref 15–37)
BASOPHILS # BLD AUTO: 0.04 X10(3) UL (ref 0–0.2)
BASOPHILS NFR BLD AUTO: 0.5 %
BILIRUB SERPL-MCNC: 0.4 MG/DL (ref 0.1–2)
BUN BLD-MCNC: 9 MG/DL (ref 9–23)
CALCIUM BLD-MCNC: 9.3 MG/DL (ref 8.5–10.1)
CHLORIDE SERPL-SCNC: 111 MMOL/L (ref 98–112)
CHOLEST SERPL-MCNC: 163 MG/DL (ref ?–200)
CO2 SERPL-SCNC: 25 MMOL/L (ref 21–32)
CREAT BLD-MCNC: 1.07 MG/DL
DEPRECATED HBV CORE AB SER IA-ACNC: 3.3 NG/ML
EGFRCR SERPLBLD CKD-EPI 2021: 69 ML/MIN/1.73M2 (ref 60–?)
EOSINOPHIL # BLD AUTO: 0.19 X10(3) UL (ref 0–0.7)
EOSINOPHIL NFR BLD AUTO: 2.5 %
ERYTHROCYTE [DISTWIDTH] IN BLOOD BY AUTOMATED COUNT: 19.6 %
FASTING PATIENT LIPID ANSWER: YES
FASTING STATUS PATIENT QL REPORTED: YES
GLOBULIN PLAS-MCNC: 4 G/DL (ref 2.8–4.4)
GLUCOSE BLD-MCNC: 84 MG/DL (ref 70–99)
HCT VFR BLD AUTO: 30.8 %
HDLC SERPL-MCNC: 45 MG/DL (ref 40–59)
HGB BLD-MCNC: 8.6 G/DL
IMM GRANULOCYTES # BLD AUTO: 0.03 X10(3) UL (ref 0–1)
IMM GRANULOCYTES NFR BLD: 0.4 %
IRON SATN MFR SERPL: 3 %
IRON SERPL-MCNC: 12 UG/DL
LDLC SERPL CALC-MCNC: 104 MG/DL (ref ?–100)
LYMPHOCYTES # BLD AUTO: 2.49 X10(3) UL (ref 1–4)
LYMPHOCYTES NFR BLD AUTO: 33.2 %
MCH RBC QN AUTO: 18.9 PG (ref 26–34)
MCHC RBC AUTO-ENTMCNC: 27.9 G/DL (ref 31–37)
MCV RBC AUTO: 67.8 FL
MONOCYTES # BLD AUTO: 0.56 X10(3) UL (ref 0.1–1)
MONOCYTES NFR BLD AUTO: 7.5 %
NEUTROPHILS # BLD AUTO: 4.19 X10 (3) UL (ref 1.5–7.7)
NEUTROPHILS # BLD AUTO: 4.19 X10(3) UL (ref 1.5–7.7)
NEUTROPHILS NFR BLD AUTO: 55.9 %
NONHDLC SERPL-MCNC: 118 MG/DL (ref ?–130)
OSMOLALITY SERPL CALC.SUM OF ELEC: 284 MOSM/KG (ref 275–295)
PLATELET # BLD AUTO: 366 10(3)UL (ref 150–450)
POTASSIUM SERPL-SCNC: 3.7 MMOL/L (ref 3.5–5.1)
PROT SERPL-MCNC: 7.8 G/DL (ref 6.4–8.2)
RBC # BLD AUTO: 4.54 X10(6)UL
SODIUM SERPL-SCNC: 138 MMOL/L (ref 136–145)
TIBC SERPL-MCNC: 443 UG/DL (ref 240–450)
TRANSFERRIN SERPL-MCNC: 297 MG/DL (ref 200–360)
TRIGL SERPL-MCNC: 73 MG/DL (ref 30–149)
TSI SER-ACNC: 1.73 MIU/ML (ref 0.36–3.74)
VLDLC SERPL CALC-MCNC: 12 MG/DL (ref 0–30)
WBC # BLD AUTO: 7.5 X10(3) UL (ref 4–11)

## 2023-11-09 PROCEDURE — 83540 ASSAY OF IRON: CPT | Performed by: FAMILY MEDICINE

## 2023-11-09 PROCEDURE — 3075F SYST BP GE 130 - 139MM HG: CPT | Performed by: FAMILY MEDICINE

## 2023-11-09 PROCEDURE — 3079F DIAST BP 80-89 MM HG: CPT | Performed by: FAMILY MEDICINE

## 2023-11-09 PROCEDURE — 83550 IRON BINDING TEST: CPT | Performed by: FAMILY MEDICINE

## 2023-11-09 PROCEDURE — 36415 COLL VENOUS BLD VENIPUNCTURE: CPT | Performed by: FAMILY MEDICINE

## 2023-11-09 PROCEDURE — 99395 PREV VISIT EST AGE 18-39: CPT | Performed by: FAMILY MEDICINE

## 2023-11-09 PROCEDURE — 3008F BODY MASS INDEX DOCD: CPT | Performed by: FAMILY MEDICINE

## 2023-11-09 PROCEDURE — 82728 ASSAY OF FERRITIN: CPT | Performed by: FAMILY MEDICINE

## 2023-11-09 PROCEDURE — 80050 GENERAL HEALTH PANEL: CPT | Performed by: FAMILY MEDICINE

## 2023-11-09 PROCEDURE — 80061 LIPID PANEL: CPT | Performed by: FAMILY MEDICINE

## 2023-11-09 NOTE — PROGRESS NOTES
Patient came in for draw of ordered fasting labs. Patient drawn out of Right  AC, x 1 attempt and tolerated well. Light Green, lavender tube drawn.

## 2023-11-12 PROBLEM — D50.8 IRON DEFICIENCY ANEMIA REFRACTORY TO IRON THERAPY: Status: RESOLVED | Noted: 2021-03-06 | Resolved: 2023-11-12

## 2023-11-12 PROBLEM — I88.0 MESENTERIC ADENITIS: Status: RESOLVED | Noted: 2022-12-03 | Resolved: 2023-11-12

## 2023-11-12 PROBLEM — I10 SEVERE HYPERTENSION: Status: RESOLVED | Noted: 2022-12-03 | Resolved: 2023-11-12

## 2023-11-14 ENCOUNTER — PATIENT MESSAGE (OUTPATIENT)
Dept: FAMILY MEDICINE CLINIC | Facility: CLINIC | Age: 38
End: 2023-11-14

## 2023-11-14 DIAGNOSIS — D50.0 IRON DEFICIENCY ANEMIA DUE TO CHRONIC BLOOD LOSS: Primary | ICD-10-CM

## 2023-11-14 NOTE — TELEPHONE ENCOUNTER
Spoke to pt. Pt is unclear if she needs to schedule iron therapy or an appointment with Dr. Ibeth Rangel? If she needs iron therapy, she will need an order to be sent to Dr. Elias Goltz office. Routed to provider for review and advice.

## 2023-11-14 NOTE — TELEPHONE ENCOUNTER
From: Jennifer Lockwood  To: Megan Argueta  Sent: 11/14/2023 9:54 AM CST  Subject: Anemia     Good morning,   I called Dr. Valentina Vazquez to try to schedule an appointment. They said they needed you to fax an order to them at [phone].      Thanks,   [employer]

## 2023-11-15 NOTE — TELEPHONE ENCOUNTER
She needs to see Hematolofy fro her severe anemia again. Pt is already established with Dr Alfredo Escobar, has received treatment from him before, last had OV with him 3/2022. She has PPO insurance. I will place referral just in case. See in chart. Thanks.

## 2023-11-28 ENCOUNTER — HOSPITAL ENCOUNTER (EMERGENCY)
Age: 38
Discharge: HOME | End: 2023-11-28
Attending: EMERGENCY MEDICINE
Payer: COMMERCIAL

## 2023-11-28 VITALS
HEIGHT: 64 IN | HEART RATE: 86 BPM | OXYGEN SATURATION: 98 % | WEIGHT: 217 LBS | SYSTOLIC BLOOD PRESSURE: 171 MMHG | RESPIRATION RATE: 18 BRPM | BODY MASS INDEX: 37.05 KG/M2 | TEMPERATURE: 98 F | DIASTOLIC BLOOD PRESSURE: 98 MMHG

## 2023-11-28 DIAGNOSIS — N76.0 ACUTE VAGINITIS: Primary | ICD-10-CM

## 2023-11-28 LAB
B-HCG UR QL: NEGATIVE
BILIRUB UR QL STRIP.AUTO: NEGATIVE
CLARITY UR REFRACT.AUTO: CLEAR
COLOR UR AUTO: YELLOW
GLUCOSE UR STRIP.AUTO-MCNC: NEGATIVE MG/DL
LEUKOCYTE ESTERASE UR QL STRIP.AUTO: NEGATIVE
NITRITE UR QL STRIP.AUTO: NEGATIVE
PH UR STRIP.AUTO: 7 [PH] (ref 5–8)
RBC UR QL AUTO: NEGATIVE
SP GR UR STRIP.AUTO: 1.02 (ref 1–1.03)
UROBILINOGEN UR STRIP.AUTO-MCNC: 1 MG/DL

## 2023-11-28 PROCEDURE — 87491 CHLMYD TRACH DNA AMP PROBE: CPT | Performed by: EMERGENCY MEDICINE

## 2023-11-28 PROCEDURE — 81001 URINALYSIS AUTO W/SCOPE: CPT

## 2023-11-28 PROCEDURE — 87591 N.GONORRHOEAE DNA AMP PROB: CPT | Performed by: EMERGENCY MEDICINE

## 2023-11-28 PROCEDURE — 87086 URINE CULTURE/COLONY COUNT: CPT | Performed by: EMERGENCY MEDICINE

## 2023-11-28 PROCEDURE — 81001 URINALYSIS AUTO W/SCOPE: CPT | Performed by: EMERGENCY MEDICINE

## 2023-11-28 PROCEDURE — 81514 NFCT DS BV&VAGINITIS DNA ALG: CPT | Performed by: EMERGENCY MEDICINE

## 2023-11-28 PROCEDURE — 87077 CULTURE AEROBIC IDENTIFY: CPT | Performed by: EMERGENCY MEDICINE

## 2023-11-28 PROCEDURE — 99283 EMERGENCY DEPT VISIT LOW MDM: CPT

## 2023-11-28 PROCEDURE — 81015 MICROSCOPIC EXAM OF URINE: CPT

## 2023-11-28 PROCEDURE — 81025 URINE PREGNANCY TEST: CPT

## 2023-11-28 RX ORDER — FLUCONAZOLE 150 MG/1
150 TABLET ORAL ONCE
Qty: 1 TABLET | Refills: 0 | Status: SHIPPED | OUTPATIENT
Start: 2023-11-28 | End: 2023-11-28

## 2023-11-28 NOTE — DISCHARGE INSTRUCTIONS
You can wait for the results to cross over into MyChart  If positive for Candida take Diflucan as prescribed  Recheck with your primary care physician in 1 week  Return for any problems

## 2023-11-29 LAB
BV BACTERIA DNA VAG QL NAA+PROBE: NEGATIVE
C GLABRATA DNA VAG QL NAA+PROBE: NEGATIVE
C KRUSEI DNA VAG QL NAA+PROBE: NEGATIVE
C TRACH DNA SPEC QL NAA+PROBE: NEGATIVE
CANDIDA DNA VAG QL NAA+PROBE: POSITIVE
N GONORRHOEA DNA SPEC QL NAA+PROBE: NEGATIVE
T VAGINALIS DNA VAG QL NAA+PROBE: NEGATIVE

## 2023-12-04 ENCOUNTER — OFFICE VISIT (OUTPATIENT)
Dept: HEMATOLOGY/ONCOLOGY | Facility: HOSPITAL | Age: 38
End: 2023-12-04
Attending: SPECIALIST
Payer: COMMERCIAL

## 2023-12-20 ENCOUNTER — HOSPITAL ENCOUNTER (EMERGENCY)
Age: 38
Discharge: HOME | End: 2023-12-20
Attending: EMERGENCY MEDICINE
Payer: COMMERCIAL

## 2023-12-20 VITALS
WEIGHT: 211 LBS | DIASTOLIC BLOOD PRESSURE: 114 MMHG | BODY MASS INDEX: 36.02 KG/M2 | HEIGHT: 64 IN | RESPIRATION RATE: 16 BRPM | OXYGEN SATURATION: 100 % | TEMPERATURE: 98 F | SYSTOLIC BLOOD PRESSURE: 161 MMHG | HEART RATE: 80 BPM

## 2023-12-20 DIAGNOSIS — R03.0 ELEVATED BLOOD PRESSURE READING: ICD-10-CM

## 2023-12-20 DIAGNOSIS — B34.9 VIRAL SYNDROME: Primary | ICD-10-CM

## 2023-12-20 LAB
POCT INFLUENZA A: NEGATIVE
POCT INFLUENZA B: NEGATIVE
SARS-COV-2 RNA RESP QL NAA+PROBE: NOT DETECTED

## 2023-12-20 PROCEDURE — 99283 EMERGENCY DEPT VISIT LOW MDM: CPT

## 2023-12-20 PROCEDURE — 87502 INFLUENZA DNA AMP PROBE: CPT | Performed by: PHYSICIAN ASSISTANT

## 2023-12-20 RX ORDER — VALSARTAN AND HYDROCHLOROTHIAZIDE 160; 25 MG/1; MG/1
1 TABLET ORAL DAILY
Qty: 30 TABLET | Refills: 0 | Status: SHIPPED | OUTPATIENT
Start: 2023-12-20

## 2023-12-20 NOTE — DISCHARGE INSTRUCTIONS
Push clear fluids. Limit salt intake. Keep a blood pressure log. If readings remain elevated, restart medication.   Follow-up with primary care physician

## 2023-12-20 NOTE — ED PROVIDER NOTES
Patient had a exposure to COVID last week. Starting on Friday, patient began having some bodyaches malaise with some stuffy nose and congestion. No high fever. No vomiting or diarrhea. No chest pain or shortness of breath    On examination, this alert adult woman who appears in no extraordinary distress awake, alert, conversant. No coughing while in the room. Room air pulse entry is 100%  Eye sclera white  Lungs clear to auscultation    Symptoms suggest viral process with COVID and influenza include the differential    Flu swab negative  COVID test negative      I discussed treatment of viral illnesses with fluids, rest, Tylenol alternate with ibuprofen, and over-the-counter cold and cough medications. I recommend close follow-up with her primary care provider      I provided a substantive portion of care for this patient. I personally performed the medical decision making for this encounter.

## 2024-01-01 DIAGNOSIS — Z51.81 THERAPEUTIC DRUG MONITORING: ICD-10-CM

## 2024-01-02 ENCOUNTER — PATIENT MESSAGE (OUTPATIENT)
Dept: INTERNAL MEDICINE CLINIC | Facility: CLINIC | Age: 39
End: 2024-01-02

## 2024-01-02 DIAGNOSIS — Z51.81 THERAPEUTIC DRUG MONITORING: ICD-10-CM

## 2024-01-02 RX ORDER — SEMAGLUTIDE 2.4 MG/.75ML
2.4 INJECTION, SOLUTION SUBCUTANEOUS WEEKLY
Qty: 3 ML | Refills: 1 | Status: SHIPPED | OUTPATIENT
Start: 2024-01-02

## 2024-01-02 RX ORDER — SEMAGLUTIDE 2.4 MG/.75ML
2.4 INJECTION, SOLUTION SUBCUTANEOUS WEEKLY
Qty: 3 ML | Refills: 2 | OUTPATIENT
Start: 2024-01-02

## 2024-01-02 RX ORDER — SEMAGLUTIDE 2.4 MG/.75ML
2.4 INJECTION, SOLUTION SUBCUTANEOUS WEEKLY
Qty: 3 ML | Refills: 2 | Status: CANCELLED
Start: 2024-01-02

## 2024-01-02 NOTE — TELEPHONE ENCOUNTER
Requesting   Requested Prescriptions     Pending Prescriptions Disp Refills    semaglutide-weight management (WEGOVY) 2.4 MG/0.75ML Subcutaneous Solution Auto-injector 3 mL 2     Sig: Inject 0.75 mL (2.4 mg total) into the skin once a week.     LOV: 8/10/23  RTC: not noted  Filled: 10/30/23 #3 with 2 refills    Future Appointments   Date Time Provider Department Center   4/18/2024  1:40 PM Sally Guerrero APRN [id number]

## 2024-01-02 NOTE — TELEPHONE ENCOUNTER
From: Tami Serrano  To: Sally Guerrero  Sent: 1/2/2024 7:54 AM CST  Subject: Refill    Hey I am out of refills and I got a message saying that it was denied

## 2024-01-02 NOTE — TELEPHONE ENCOUNTER
I called the pt and state that her pen malfunction so she is out of the medication.she need the old prescriptions to get reimbursement at [id number]  [facility] at [address] [place], [place] [address]

## 2024-01-02 NOTE — TELEPHONE ENCOUNTER
Requesting   Requested Prescriptions     Pending Prescriptions Disp Refills    WEGOVY 2.4 MG/0.75ML Subcutaneous Solution Auto-injector [Pharmacy Med Name: WEGOVY 2.4MG/0.75ML PF PEN INJ] 3 mL 2     Sig: ADMINISTER 2.4 MG UNDER THE SKIN 1 TIME A WEEK     LOV: 8/10/23  RTC: not noted  Filled: 10/30/23 #3 with 2 refills    No future appointments.  Needs appt

## 2024-01-11 ENCOUNTER — HOSPITAL ENCOUNTER (EMERGENCY)
Age: 39
Discharge: HOME | End: 2024-01-11
Attending: EMERGENCY MEDICINE
Payer: COMMERCIAL

## 2024-01-11 VITALS
TEMPERATURE: 98 F | HEART RATE: 74 BPM | OXYGEN SATURATION: 100 % | DIASTOLIC BLOOD PRESSURE: 108 MMHG | SYSTOLIC BLOOD PRESSURE: 160 MMHG | WEIGHT: 213 LBS | BODY MASS INDEX: 37 KG/M2 | RESPIRATION RATE: 16 BRPM

## 2024-01-11 DIAGNOSIS — J34.89 SINUS PRESSURE: Primary | ICD-10-CM

## 2024-01-11 PROCEDURE — 99283 EMERGENCY DEPT VISIT LOW MDM: CPT

## 2024-01-11 PROCEDURE — 87502 INFLUENZA DNA AMP PROBE: CPT | Performed by: EMERGENCY MEDICINE

## 2024-01-11 RX ORDER — PREDNISONE 20 MG/1
20 TABLET ORAL 2 TIMES DAILY
Qty: 10 TABLET | Refills: 0 | Status: SHIPPED | OUTPATIENT
Start: 2024-01-11 | End: 2024-01-16

## 2024-01-11 RX ORDER — AMOXICILLIN AND CLAVULANATE POTASSIUM 875; 125 MG/1; MG/1
1 TABLET, FILM COATED ORAL 2 TIMES DAILY
Qty: 20 TABLET | Refills: 0 | Status: SHIPPED | OUTPATIENT
Start: 2024-01-11 | End: 2024-01-21

## 2024-01-11 NOTE — ED PROVIDER NOTES
Patient Seen in: [facility] [place]      History     Chief Complaint   Patient presents with    Cough/URI     Stated Complaint: pain in the left side of the face    Subjective:   38-year-old female who yesterday when she sneezed and has felt pain inside her nostrils mainly the left side.  States she did just get over a cold last week.  She denies congestion or runny nose otherwise.  No chest pain.  No shortness of breath.  No conversational dyspnea.  No known sick exposure.  No vomiting.  Eating and drinking well.            Objective:   Past Medical History:   Diagnosis Date    Allergic rhinitis     Hypertension in pregnancy, preeclampsia     Morbid obesity with BMI of 40.0-44.9, adult (HCC)     Obesity     Obstructive apnea 2018    DMG SPLIT AHI 47 SaO2  alonso 85% CPAP 6-16 HME    Obstructive sleep apnea     Sleep apnea     Unspecified essential hypertension     Visual impairment               Past Surgical History:   Procedure Laterality Date    ANESTH, SECTION             DELIVERY ONLY      TUBAL LIGATION                  Social History     Socioeconomic History    Marital status: Single   Tobacco Use    Smoking status: Former     Packs/day: 0.00     Years: 10.00     Additional pack years: 0.00     Total pack years: 0.00     Types: Cigarettes     Start date: 9/10/2005     Quit date: 10/25/2017     Years since quittin.2     Passive exposure: Never    Smokeless tobacco: Never   Vaping Use    Vaping Use: Every day    Substances: Nicotine, THC    Devices: Disposable   Substance and Sexual Activity    Alcohol use: Yes     Alcohol/week: 3.0 standard drinks of alcohol     Types: 1 Glasses of wine, 2 Shots of liquor per week     Comment: Weekly    Drug use: Yes     Frequency: 1.0 times per week     Types: Cannabis     Comment: daily   Other Topics Concern    Caffeine Concern No    Exercise No    Seat Belt No    Special Diet No    Stress Concern No    Weight  Concern Yes              Review of Systems   Constitutional: Negative.    HENT:  Positive for sneezing. Negative for congestion and rhinorrhea.    Respiratory: Negative.     Cardiovascular: Negative.    Neurological: Negative.    All other systems reviewed and are negative.      Positive for stated complaint: pain in the left side of the face  Other systems are as noted in HPI.  Constitutional and vital signs reviewed.      All other systems reviewed and negative except as noted above.    Physical Exam     ED Triage Vitals [01/11/24 1109]   BP (!) 160/108   Pulse 74   Resp 16   Temp 98 °F (36.7 °C)   Temp src Temporal   SpO2 100 %   O2 Device None (Room air)       Current:BP (!) 160/108   Pulse 74   Temp 98 °F (36.7 °C) (Temporal)   Resp 16   Wt 96.6 kg   LMP 12/13/2023 (Exact Date)   SpO2 100%   BMI 36.56 kg/m²         Physical Exam  Vitals and nursing note reviewed.   Constitutional:       General: She is not in acute distress.     Appearance: Normal appearance. She is obese. She is not ill-appearing, toxic-appearing or diaphoretic.   HENT:      Head:      Jaw: No trismus.      Right Ear: Tympanic membrane, ear canal and external ear normal.      Left Ear: Tympanic membrane, ear canal and external ear normal.      Nose:      Right Turbinates: Swollen.      Left Turbinates: Swollen.      Right Sinus: No maxillary sinus tenderness or frontal sinus tenderness.      Left Sinus: No maxillary sinus tenderness or frontal sinus tenderness.      Mouth/Throat:      Lips: Pink. No lesions.      Mouth: Mucous membranes are moist. No oral lesions.      Tongue: No lesions.      Palate: No lesions.      Pharynx: Oropharynx is clear. Uvula midline. No pharyngeal swelling, oropharyngeal exudate, posterior oropharyngeal erythema or uvula swelling.   Cardiovascular:      Rate and Rhythm: Normal rate and regular rhythm.      Pulses: Normal pulses.      Heart sounds: Normal heart sounds.   Pulmonary:      Effort: Pulmonary  effort is normal. No respiratory distress.      Breath sounds: Normal breath sounds. No stridor. No wheezing, rhonchi or rales.   Musculoskeletal:      Cervical back: Normal range of motion and neck supple.   Skin:     General: Skin is warm and dry.      Coloration: Skin is not jaundiced or pale.      Findings: No rash.   Neurological:      General: No focal deficit present.      Mental Status: She is alert.   Psychiatric:         Mood and Affect: Mood normal.               ED Course     Labs Reviewed   RAPID SARS-COV-2 BY PCR - Normal   POCT FLU TEST - Normal    Narrative:     This assay is a rapid molecular in vitro test utilizing nucleic acid amplification of influenza A and B viral RNA.                      [facility]                                         Medical Decision Making  38-year-old female who yesterday when she sneezed and has felt pain inside her nostrils mainly the left side.  States she did just get over a cold last week.  She denies congestion or runny nose otherwise.  No chest pain.  No shortness of breath.  No conversational dyspnea.  No known sick exposure.  No vomiting.  Eating and drinking well.  Negative flu.  Negative COVID.  No adventitious breath sounds.  No signs of increased work of breathing or respiratory distress.  Both nasal turbinates look enlarged.  Was seen 10 days ago for cold-like symptoms.  Will treat for possible sinus infection.  Flonase, Zyrtec, steroids.    Supportive/home management of diagnosis/illness/injury discussed. Red flag symptoms discussed.  Signs and symptoms/criteria that would necessitate reevaluation, including ER evaluation discussed.  Patient and/or responsible adult verbalize and agree with management and plan of care.    Speech recognition software was used during this dictation.  There may be minor errors in transcription.      Amount and/or Complexity of Data Reviewed  Labs: ordered. Decision-making details documented in ED Course.        Disposition and Plan      Clinical Impression:  1. Sinus pressure         Disposition:  Discharge  1/11/2024 12:44 pm    Follow-up:  Alvina Manley MD  [address] [place] [address]  [phone]    Call today            Medications Prescribed:  Current Discharge Medication List        START taking these medications    Details   amoxicillin clavulanate 875-125 MG Oral Tab Take 1 tablet by mouth 2 (two) times daily for 10 days.  Qty: 20 tablet, Refills: 0      predniSONE 20 MG Oral Tab Take 1 tablet (20 mg total) by mouth 2 (two) times daily for 5 days.  Qty: 10 tablet, Refills: 0

## 2024-01-11 NOTE — ED PROVIDER NOTES
I myself had seen patient about 2 weeks ago.  She was complaining of viral syndrome symptoms at that time and had negative COVID and flu testing.  Patient reports that she got over this illness only to get another what sounds like viral syndrome starting 10 days ago.  She has had hoarse voice that just returned as well as stuffy nose and congestion.  She sneezed the other day and immediately afterwards experienced discomfort directly behind the left side over the bridge of the nose.  The discomfort persists.  No high fever.  No severe headache.  No nausea or vomiting.  No dizziness    On examination, is alert adult woman who appears in no extraordinary distress.  She has a nasal congested tone to voice  Eye sclera white and conjunctiva pink.  Nose congested without purulent secretions.  Mucosa appears inflamed.  There is mild tenderness over the bridge of the nose on the left side without crepitus  Throat: Posterior pharynx is mildly erythematous without exudate.  Oromucosa is moist  Neck is supple    Patient complains of paranasal sinus pressure after a sneeze suggesting barotrauma.  She has had 10 days of cold and sinus symptoms.  Reasonable to treat with antibiotic at this point.  I also recommended a short course of steroid, nasal steroid, and combination antihistamines.  I encourage close follow-up with her primary care doctor.    I provided a substantive portion of care for this patient. I personally performed the medical decision making for this encounter.

## 2024-01-11 NOTE — DISCHARGE INSTRUCTIONS
Start an over-the-counter antihistamine like Claritin daytime and Benadryl at nighttime  Augmentin antibiotic  Flonase nasal spray  Short course of steroid as prescribed  Antibiotic as prescribed     contact your primary care doctor today to arrange close follow-up

## 2024-02-06 RX ORDER — SEMAGLUTIDE 2.4 MG/.75ML
2.4 INJECTION, SOLUTION SUBCUTANEOUS WEEKLY
Qty: 3 ML | Refills: 2 | OUTPATIENT
Start: 2024-02-06

## 2024-02-07 ENCOUNTER — OFFICE VISIT (OUTPATIENT)
Dept: OBGYN CLINIC | Facility: CLINIC | Age: 39
End: 2024-02-07
Payer: COMMERCIAL

## 2024-02-07 ENCOUNTER — OFFICE VISIT (OUTPATIENT)
Dept: HEMATOLOGY/ONCOLOGY | Age: 39
End: 2024-02-07
Attending: SPECIALIST
Payer: COMMERCIAL

## 2024-02-07 VITALS
RESPIRATION RATE: 20 BRPM | HEIGHT: 64.02 IN | WEIGHT: 209.31 LBS | TEMPERATURE: 98 F | BODY MASS INDEX: 35.73 KG/M2 | DIASTOLIC BLOOD PRESSURE: 89 MMHG | SYSTOLIC BLOOD PRESSURE: 143 MMHG | OXYGEN SATURATION: 98 % | HEART RATE: 71 BPM

## 2024-02-07 VITALS
DIASTOLIC BLOOD PRESSURE: 86 MMHG | WEIGHT: 209.25 LBS | HEART RATE: 92 BPM | SYSTOLIC BLOOD PRESSURE: 134 MMHG | HEIGHT: 64 IN | BODY MASS INDEX: 35.72 KG/M2

## 2024-02-07 DIAGNOSIS — N76.0 VAGINITIS AND VULVOVAGINITIS: Primary | ICD-10-CM

## 2024-02-07 DIAGNOSIS — D50.0 IRON DEFICIENCY ANEMIA SECONDARY TO BLOOD LOSS (CHRONIC): Primary | ICD-10-CM

## 2024-02-07 DIAGNOSIS — N92.0 MENORRHAGIA WITH REGULAR CYCLE: ICD-10-CM

## 2024-02-07 DIAGNOSIS — D50.8 IRON DEFICIENCY ANEMIA REFRACTORY TO IRON THERAPY: ICD-10-CM

## 2024-02-07 LAB
BASOPHILS # BLD AUTO: 0.06 X10(3) UL (ref 0–0.2)
BASOPHILS NFR BLD AUTO: 0.6 %
DEPRECATED HBV CORE AB SER IA-ACNC: 7.7 NG/ML
EOSINOPHIL # BLD AUTO: 0.11 X10(3) UL (ref 0–0.7)
EOSINOPHIL NFR BLD AUTO: 1.1 %
ERYTHROCYTE [DISTWIDTH] IN BLOOD BY AUTOMATED COUNT: 21.8 %
HCT VFR BLD AUTO: 31.9 %
HGB BLD-MCNC: 9.1 G/DL
IMM GRANULOCYTES # BLD AUTO: 0.02 X10(3) UL (ref 0–1)
IMM GRANULOCYTES NFR BLD: 0.2 %
IRON SATN MFR SERPL: 3 %
IRON SERPL-MCNC: 15 UG/DL
LYMPHOCYTES # BLD AUTO: 3.7 X10(3) UL (ref 1–4)
LYMPHOCYTES NFR BLD AUTO: 36.4 %
MCH RBC QN AUTO: 19.2 PG (ref 26–34)
MCHC RBC AUTO-ENTMCNC: 28.5 G/DL (ref 31–37)
MCV RBC AUTO: 67.4 FL
MONOCYTES # BLD AUTO: 0.79 X10(3) UL (ref 0.1–1)
MONOCYTES NFR BLD AUTO: 7.8 %
NEUTROPHILS # BLD AUTO: 5.49 X10 (3) UL (ref 1.5–7.7)
NEUTROPHILS # BLD AUTO: 5.49 X10(3) UL (ref 1.5–7.7)
NEUTROPHILS NFR BLD AUTO: 53.9 %
PLATELET # BLD AUTO: 339 10(3)UL (ref 150–450)
RBC # BLD AUTO: 4.73 X10(6)UL
TIBC SERPL-MCNC: 490 UG/DL (ref 240–450)
TRANSFERRIN SERPL-MCNC: 329 MG/DL (ref 200–360)
WBC # BLD AUTO: 10.2 X10(3) UL (ref 4–11)

## 2024-02-07 PROCEDURE — 81514 NFCT DS BV&VAGINITIS DNA ALG: CPT | Performed by: NURSE PRACTITIONER

## 2024-02-07 PROCEDURE — 99214 OFFICE O/P EST MOD 30 MIN: CPT | Performed by: SPECIALIST

## 2024-02-07 PROCEDURE — 3008F BODY MASS INDEX DOCD: CPT | Performed by: NURSE PRACTITIONER

## 2024-02-07 PROCEDURE — 3079F DIAST BP 80-89 MM HG: CPT | Performed by: NURSE PRACTITIONER

## 2024-02-07 PROCEDURE — 99203 OFFICE O/P NEW LOW 30 MIN: CPT | Performed by: NURSE PRACTITIONER

## 2024-02-07 PROCEDURE — 3075F SYST BP GE 130 - 139MM HG: CPT | Performed by: NURSE PRACTITIONER

## 2024-02-07 RX ORDER — TRIAMCINOLONE ACETONIDE 1 MG/G
1 OINTMENT TOPICAL 2 TIMES DAILY
Qty: 30 G | Refills: 0 | Status: SHIPPED | OUTPATIENT
Start: 2024-02-07 | End: 2024-02-17

## 2024-02-07 NOTE — PROGRESS NOTES
Here for new gynecology visit.  38 year old G 3 P 3.  Patient's last menstrual period was 2024 (approximate)..     Here for a recent onset of increased thick white discharge accompanied by itching and burning. She denies any odor.    She did try a 1 day Monistat treatment 5 nights ago with minimal change..     OB Hx:  3 C/S.    Family gyn hx:  neg.   Family breast hx:  neg.    Past Medical History:   Diagnosis Date    Allergic rhinitis     Hypertension in pregnancy, preeclampsia     Morbid obesity with BMI of 40.0-44.9, adult (HCC)     Obesity     Obstructive apnea 2018    DMG SPLIT AHI 47 SaO2  alonso 85% CPAP 6-16 HME    Obstructive sleep apnea     Sleep apnea     Unspecified essential hypertension     Visual impairment      Past Surgical History:   Procedure Laterality Date    ANESTH, SECTION             DELIVERY ONLY      TUBAL LIGATION       Current Outpatient Medications on File Prior to Visit   Medication Sig Dispense Refill    semaglutide-weight management (WEGOVY) 2.4 MG/0.75ML Subcutaneous Solution Auto-injector Inject 0.75 mL (2.4 mg total) into the skin once a week. 3 mL 1    Valsartan-hydroCHLOROthiazide 160-25 MG Oral Tab Take 1 tablet by mouth daily. 30 tablet 0     No current facility-administered medications on file prior to visit.     OB History    Para Term  AB Living   3 3 2 1   3   SAB IAB Ectopic Multiple Live Births           3      # Outcome Date GA Lbr Flako/2nd Weight Sex Delivery Anes PTL Lv   3  18 36w3d   F CS-LTranv Spinal  JACKIE   2 Term 16 38w5d  8 lb 4 oz (3.742 kg) M Caesarean   JACKIE      Complications: Preeclampsia   1 Term 12 37w0d  7 lb (3.175 kg) M Caesarean   JACKIE      Complications: Preeclampsia       ROS:    General:  No wt loss, wt gain, appetite changes.               /86 (BP Location: Left arm)   Pulse 92   Ht 64\"   Wt 209 lb 4 oz (94.9 kg)   LMP 2024 (Approximate)   BMI 35.92 kg/m²      VULVA:  No lesions or erythema.  VAGINA:  No lesions, moderate thick white discharge.  CERVIX:  No lesions noted.      IMP/PLAN:    1. Vaginitis and vulvovaginitis  - Vaginitis Vaginosis PCR Panel; Future  - triamcinolone 0.1 % External Ointment; Apply 1 Application topically 2 (two) times daily for 10 days.  Dispense: 30 g; Refill: 0  - Vaginitis Vaginosis PCR Panel    See as needed.

## 2024-02-07 NOTE — PROGRESS NOTES
Here for MD consultation for iron def anemia  +Fatigue  Occasional HA  LMP 1/9/24  +Heavy menses, lasts 7-9 days  Cannot tolerated OTC iron supplement  Received IV iron in 2022  Education Record     Learner:  Patient     Disease / Diagnosis:      Barriers / Limitations: none                 Comments:     Method:  Discussion                Comments:     General Topics:  Plan of care reviewed                Comments:     Outcome:  Shows understanding                Comments:

## 2024-02-07 NOTE — PROGRESS NOTES
[facility] Progress Note      Patient Name: Tami Serrano   YOB: 1985  Medical Record Number: [id number]  Attending Physician: Conner Jarquin M.D.      The  Century Cures Act makes medical notes like these available to patients in the interest of transparency. Please be advised this is a medical document. Medical documents are intended to carry relevant information, facts as evident, and the clinical opinion of the practitioner. The medical note is intended as peer to peer communication and may appear blunt or direct. It is written in medical language and may contain abbreviations or verbiage that are unfamiliar.      Date of Visit: 2024        Chief Complaint  Iron deficiency anemia - follow up.    History of Present Illness  Tami Serrano is a 38 year old female with recurrent iron deficiency anemia. Patient last received IV iron dextran therapy on 2022 but failed to follow up as recommended. She was also advised to undergo evaluation by gastroenterology but failed to do so.     Patient no presents after laboratory studies on 2023 showed iron deficiency anemia.     She reports continued heavy menstrual periods. She complains of fatigue and dyspnea with exertion.     Past Medical History (historical data, reviewed by physician)   Pre-eclampsia; obstructive sleep apnea; essential hypertension.    Past Surgical History (historical data, reviewed by physician)   ; tubal ligation.     Family History (historical data, reviewed by physician)   Paternal grandfather, paternal uncle, paternal aunts x 2 with cancer but she does not know the types. While in the room, she called her father who said he also does not know the types of cancer in the family.     Social History (historical data, reviewed by physician)   Previous tobacco use but quit in 2017.      Current Medications   triamcinolone 0.1 % External Ointment Apply 1 Application topically 2  (two) times daily for 10 days. 30 g 0    semaglutide-weight management (WEGOVY) 2.4 MG/0.75ML Subcutaneous Solution Auto-injector Inject 0.75 mL (2.4 mg total) into the skin once a week. 3 mL 1    Valsartan-hydroCHLOROthiazide 160-25 MG Oral Tab Take 1 tablet by mouth daily. 30 tablet 0     Allergies   Ms. Leggin is allergic to seasonal.     Vital Signs   /89 (BP Location: Left arm, Patient Position: Sitting, Cuff Size: large)   Pulse 71   Temp 98 °F (36.7 °C) (Tympanic)   Resp 20   Ht 1.626 m (5' 4.02\")   Wt 94.9 kg (209 lb 4.8 oz)   LMP 12/13/2023 (Exact Date)   SpO2 98%   BMI 35.91 kg/m²      Physical Examination   Constitutional      Well developed, well nourished. Appears close to chronological age. No apparent distress.   Head   Normocephalic and atraumatic.  Eyes   Conjunctiva clear; sclera anicteric.  ENMT                 External nose normal; external ears normal.  Neck                   Supple, without masses.  Hematologic/Lymphatic No cervical, supraclavicular, axillary lymphadenopathy.     Respiratory          Normal effort; no respiratory distress; lungs clear to auscultation bilaterally.  Cardiovascular     Regular rate and rhythm.  Abdomen            Not distended.   Integumentary      No obvious rashes.   Neurologic           Motor and sensory grossly intact.  Psychiatric          Mood and affect appropriate.      Laboratory   Recent Results (from the past 168 hour(s))   Vaginitis Vaginosis PCR Panel    Collection Time: 02/07/24  2:12 PM    Specimen: Vaginal; Other   Result Value Ref Range    Bacterial Vaginosis Positive (A) Negative    Candida group Positive (A) Negative    Nakaseomyces glabrata (Candida glabrata) Negative Negative    Pichia kudriavzevii (Candida krusei) Negative Negative    Trichomonas vaginalis Negative Negative   FERRITIN [E]    Collection Time: 02/07/24  3:17 PM   Result Value Ref Range    Ferritin 7.7 (L) 12.0 - 160.0 ng/mL   IRON AND TIBC [E]    Collection Time:  02/07/24  3:17 PM   Result Value Ref Range    Iron 15 (L) 50 - 170 ug/dL    Transferrin 329 200 - 360 mg/dL    Total Iron Binding Capacity 490 (H) 240 - 450 ug/dL    % Saturation 3 (L) 15 - 50 %   CBC W/ DIFFERENTIAL    Collection Time: 02/07/24  3:17 PM   Result Value Ref Range    WBC 10.2 4.0 - 11.0 x10(3) uL    RBC 4.73 3.80 - 5.30 x10(6)uL    HGB 9.1 (L) 12.0 - 16.0 g/dL    HCT 31.9 (L) 35.0 - 48.0 %    .0 150.0 - 450.0 10(3)uL    MCV 67.4 (L) 80.0 - 100.0 fL    MCH 19.2 (L) 26.0 - 34.0 pg    MCHC 28.5 (L) 31.0 - 37.0 g/dL    RDW 21.8 %    Neutrophil Absolute Prelim 5.49 1.50 - 7.70 x10 (3) uL    Neutrophil Absolute 5.49 1.50 - 7.70 x10(3) uL    Lymphocyte Absolute 3.70 1.00 - 4.00 x10(3) uL    Monocyte Absolute 0.79 0.10 - 1.00 x10(3) uL    Eosinophil Absolute 0.11 0.00 - 0.70 x10(3) uL    Basophil Absolute 0.06 0.00 - 0.20 x10(3) uL    Immature Granulocyte Absolute 0.02 0.00 - 1.00 x10(3) uL    Neutrophil % 53.9 %    Lymphocyte % 36.4 %    Monocyte % 7.8 %    Eosinophil % 1.1 %    Basophil % 0.6 %    Immature Granulocyte % 0.2 %     Impression and Plan   1.   Iron deficiency anemia: Etiology is most likely menstrual losses. I recommend therapy with IV iron dextran. Patient is in agreement and will return later this week for treatment. I also recommend repeat labs in 2 months as follow up and then every 3 months for the next year to gauge rate of iron loss. Patient is in agreement, though patient has previously been non-compliant with advised follow up.          Patent previously was advised to seek consultation with gastroenterology but failed to do so.    2.   Menorrhagia: I recommend evaluation by gynecology. Unless her menstrual blood losses are decreased, iron deficiency will be a recurring issues.     Planned Follow Up   Patient advised to return for lab studies in 2 months.    Electronically signed by:    Conner Jarquin M.D.  System Medical Director of Oncology Services  [facility]

## 2024-02-08 ENCOUNTER — OFFICE VISIT (OUTPATIENT)
Dept: HEMATOLOGY/ONCOLOGY | Age: 39
End: 2024-02-08
Attending: SPECIALIST
Payer: COMMERCIAL

## 2024-02-08 VITALS
HEART RATE: 76 BPM | OXYGEN SATURATION: 98 % | SYSTOLIC BLOOD PRESSURE: 121 MMHG | DIASTOLIC BLOOD PRESSURE: 84 MMHG | RESPIRATION RATE: 16 BRPM | TEMPERATURE: 98 F

## 2024-02-08 DIAGNOSIS — D50.0 IRON DEFICIENCY ANEMIA SECONDARY TO BLOOD LOSS (CHRONIC): Primary | ICD-10-CM

## 2024-02-08 LAB
BV BACTERIA DNA VAG QL NAA+PROBE: POSITIVE
C GLABRATA DNA VAG QL NAA+PROBE: NEGATIVE
C KRUSEI DNA VAG QL NAA+PROBE: NEGATIVE
CANDIDA DNA VAG QL NAA+PROBE: POSITIVE
T VAGINALIS DNA VAG QL NAA+PROBE: NEGATIVE

## 2024-02-08 PROCEDURE — 96365 THER/PROPH/DIAG IV INF INIT: CPT

## 2024-02-09 ENCOUNTER — APPOINTMENT (OUTPATIENT)
Dept: HEMATOLOGY/ONCOLOGY | Age: 39
End: 2024-02-09
Attending: SPECIALIST
Payer: COMMERCIAL

## 2024-02-23 DIAGNOSIS — Z51.81 THERAPEUTIC DRUG MONITORING: ICD-10-CM

## 2024-02-24 DIAGNOSIS — Z51.81 THERAPEUTIC DRUG MONITORING: ICD-10-CM

## 2024-02-24 NOTE — TELEPHONE ENCOUNTER
Requesting   Requested Prescriptions     Pending Prescriptions Disp Refills    semaglutide-weight management (WEGOVY) 2.4 MG/0.75ML Subcutaneous Solution Auto-injector 3 mL 1     Sig: Inject 0.75 mL (2.4 mg total) into the skin once a week.     LOV: 8/10/23  RTC: not noted  Filled: 1/2/24 #3 with 1 refills    Future Appointments   Date Time Provider Department Center   4/18/2024  1:40 PM Sally Guerrero APRN [id number]   6/24/2024  9:20 AM Sally Guerrero APRN [facility]

## 2024-02-25 RX ORDER — SEMAGLUTIDE 2.4 MG/.75ML
2.4 INJECTION, SOLUTION SUBCUTANEOUS WEEKLY
Qty: 3 ML | Refills: 0 | Status: SHIPPED | OUTPATIENT
Start: 2024-02-25

## 2024-02-26 RX ORDER — SEMAGLUTIDE 2.4 MG/.75ML
2.4 INJECTION, SOLUTION SUBCUTANEOUS WEEKLY
Qty: 3 ML | Refills: 1 | OUTPATIENT
Start: 2024-02-26

## 2024-02-26 NOTE — TELEPHONE ENCOUNTER
Requesting wegovy 2.4  LOV: 8/10/23  RTC: none on file  Filled: 2/26/24 #3ml with 0 refills    Future Appointments   Date Time Provider Department Center   4/18/2024  1:40 PM Sally Guerrero APRN [id number]   6/24/2024  9:20 AM Sally Guerrero APRN [facility]     [facility]

## 2024-03-24 DIAGNOSIS — Z51.81 THERAPEUTIC DRUG MONITORING: ICD-10-CM

## 2024-03-24 RX ORDER — SEMAGLUTIDE 2.4 MG/.75ML
2.4 INJECTION, SOLUTION SUBCUTANEOUS WEEKLY
Qty: 3 ML | Refills: 0 | Status: CANCELLED | OUTPATIENT
Start: 2024-03-24

## 2024-03-25 ENCOUNTER — PATIENT MESSAGE (OUTPATIENT)
Dept: INTERNAL MEDICINE CLINIC | Facility: CLINIC | Age: 39
End: 2024-03-25

## 2024-03-25 RX ORDER — SEMAGLUTIDE 2.4 MG/.75ML
2.4 INJECTION, SOLUTION SUBCUTANEOUS WEEKLY
Qty: 3 ML | Refills: 0 | OUTPATIENT
Start: 2024-03-25

## 2024-03-25 NOTE — TELEPHONE ENCOUNTER
Requesting   Requested Prescriptions     Pending Prescriptions Disp Refills    WEGOVY 2.4 MG/0.75ML Subcutaneous Solution Auto-injector [Pharmacy Med Name: WEGOVY 2.4MG/0.75ML PF PEN INJ] 3 mL 0     Sig: ADMINISTER 2.4 MG UNDER THE SKIN 1 TIME A WEEK     LOV: 8/10/23  RTC: not noted  Filled: 2/25/24 #2 with 0 refills    Future Appointments   Date Time Provider Department Center   4/18/2024  1:40 PM Sally Guerrero APRN [id number]   6/24/2024  9:20 AM Sally Guerrero APRN [facility]

## 2024-03-25 NOTE — TELEPHONE ENCOUNTER
From: Tami Serrano  To: Sally Guerrero  Sent: 3/25/2024 11:21 AM CDT  Subject: Wegovy    I need a refill on my wegovy

## 2024-04-18 ENCOUNTER — OFFICE VISIT (OUTPATIENT)
Dept: INTERNAL MEDICINE CLINIC | Facility: CLINIC | Age: 39
End: 2024-04-18
Payer: COMMERCIAL

## 2024-04-18 ENCOUNTER — PATIENT MESSAGE (OUTPATIENT)
Dept: INTERNAL MEDICINE CLINIC | Facility: CLINIC | Age: 39
End: 2024-04-18

## 2024-04-18 VITALS
BODY MASS INDEX: 36.65 KG/M2 | SYSTOLIC BLOOD PRESSURE: 120 MMHG | RESPIRATION RATE: 16 BRPM | WEIGHT: 220 LBS | HEART RATE: 82 BPM | HEIGHT: 65 IN | DIASTOLIC BLOOD PRESSURE: 82 MMHG

## 2024-04-18 DIAGNOSIS — E66.9 OBESITY (BMI 30-39.9): ICD-10-CM

## 2024-04-18 DIAGNOSIS — F32.A ANXIETY AND DEPRESSION: ICD-10-CM

## 2024-04-18 DIAGNOSIS — G47.33 OSA ON CPAP: ICD-10-CM

## 2024-04-18 DIAGNOSIS — F41.9 ANXIETY AND DEPRESSION: ICD-10-CM

## 2024-04-18 DIAGNOSIS — Z51.81 THERAPEUTIC DRUG MONITORING: ICD-10-CM

## 2024-04-18 DIAGNOSIS — E66.9 OBESITY (BMI 30-39.9): Primary | ICD-10-CM

## 2024-04-18 DIAGNOSIS — R06.83 SNORING: ICD-10-CM

## 2024-04-18 DIAGNOSIS — I10 BENIGN ESSENTIAL HYPERTENSION: ICD-10-CM

## 2024-04-18 PROCEDURE — 3079F DIAST BP 80-89 MM HG: CPT | Performed by: NURSE PRACTITIONER

## 2024-04-18 PROCEDURE — 3074F SYST BP LT 130 MM HG: CPT | Performed by: NURSE PRACTITIONER

## 2024-04-18 PROCEDURE — 3008F BODY MASS INDEX DOCD: CPT | Performed by: NURSE PRACTITIONER

## 2024-04-18 PROCEDURE — 99214 OFFICE O/P EST MOD 30 MIN: CPT | Performed by: NURSE PRACTITIONER

## 2024-04-18 RX ORDER — SEMAGLUTIDE 2.4 MG/.75ML
2.4 INJECTION, SOLUTION SUBCUTANEOUS WEEKLY
Qty: 3 ML | Refills: 2 | Status: CANCELLED | OUTPATIENT
Start: 2024-04-18

## 2024-04-18 RX ORDER — SEMAGLUTIDE 2.4 MG/.75ML
2.4 INJECTION, SOLUTION SUBCUTANEOUS WEEKLY
Qty: 3 ML | Refills: 2 | Status: SHIPPED | OUTPATIENT
Start: 2024-04-18

## 2024-04-18 NOTE — PATIENT INSTRUCTIONS
Next steps:  1.  Fill your prescribed medication and take as discussed and prescribed: wegovy 2.4mg weekly   2.  Schedule a personal nutrition consultation with one of our registered dieticians     Please try to work on the following dietary changes:  Daily protein recommendation to start:  grams  Daily carbohydrate: <130g  Daily calories: 1,500-1,600  1.  Drink water with meals and throughout the day, cut down on soda and/or juice if consumed. Consider flavored water options like Bubbly, Spindrift, Hint and Ines.  2.  Eat breakfast daily and focus on having protein with each meal, examples include: greek yogurt, cottage cheese, hard boiled egg, whole grain toast with peanut butter.   3.  Reduce refined carbohydrates and sugars which includes items such as sweets, as well as rice, pasta, and bread and make sure to choose whole grain options when having them with just 1 serving per meal about the size of your inner palm.  4.  Consume non starchy veggies daily working towards making them a good 50% of your daily food intake. Add them to lunch and dinner consistently.  5.  Start a daily probiotic: VSL#3 is recommended, (order on line at www.vsl3.com). Take 1 capsule daily with water for 30 days, then reduce to 1 every other day (this will reduce the cost). Capsules can be left out for 2 weeks, but then must be refrigerated.      Please download courtney My Fitness Pal, LoseIt! Or Net Diary to monitor daily dietary intake and you will be able to see if you are eating the right amount of calories or too much or too little which would hinder weight loss. Additionally this will help to see your daily carbohydrate and protein intake. When you set the courtney up choose 1-2 lbs/week as a goal.  Keeping a paper food journal is an option as well to remain accountable for your choices- this is the start to mindful eating! A low calorie diet has been consistently shown to support weight loss.     Continue or start exercising to  help establish a routine. If not already exercising begin with 1 day and progress as able with long-term goal of 30 minutes 5 days a week at a minimum.     Meditation daily can help manage and control stress. Chronic stress can make weight loss difficult.  Exercising is one way to help with stress, but meditation using the CALM Peggy or another comparable alternative can be done in your home or place of work with little time commitment. This Peggy can also help work on behavior change and improve sleep. Check out the segment under Calm Masterclass and listen to The 4 Pillars of Health. A great way to begin learning about the foundation of lifestyle with practical tips to use in your every day.     Check out www.yourweightmatters.org blog for continued daily support and education along this weight loss journey!    Patient Resources:     Personal Training/Fitness Classes/Health Coaching     [facility] @ [email or web address] Full fitness center with group fitness and personal training. Discount available as client of [facility].  Health Coaching and Personal Training with Rosalee Vivar at our [facility]- individual weekly coaching with option to add personal training and small group fitness classes targeted at weight loss- [phone] and/or email @ [email or web address]  360FIT [employer] [email or web address]. Group Fitness [phone] and/or email Kim at [email or web address]  [facility] Fitness Centers with multiple locations: Extra[employer] (www.US Dry Cleaning Services), Eat The OZ [employer] Fitness (www.EmiSense Technologies.TargAnox), Fit Body Bootcamp (www.UrbsterbodyboStartcappsp.TargAnox), Appevo Studio (www.meQuilibrium.TargAnox), The Exercise  (www.exercisecoach.TargAnox)     Online Fitness  Fitness  on Utube  Fit in 10 DVD series- www.vjoam90TZJ.com  Sit and Be Fit - Chair exercise series Www.sitandbefit.org  Hip  Hop Fit with Chicho Mc at www.hiphopfit.net     Apps for on the Go Fitness  Magdalena 7 Minute Workout (orange box with white 7) - free on the go HIIT training peggy  Peloton Peggy @ www.onepeloton.com     Nutrition Trackers and Tools  LoseIT! And My Fitness Pal apps and on line for tracking nutrition  NOOM - virtual health coaching  FitFoundation (healthy meals on the go) in [place] @ www.tdwkvzcdpbytg5f.achvr  Bistro MD @ [employer].bistromd.com and Hpzegb83 (keto and low carb plans recommended) @ www.kkqkwu42.com, Metabolic Meals @ www.MyMetabolicMeals.com - individual prepared meals to go  Gobble, Blue Apron, Home , Every Plate, Sunbasket- on line meal delivery programs for preparation at home  Meal Village in [place] for homemade meals to go @ www.mealvillage.achvr  Diet Doctor @ www.dietdoctor.achvr - low carb swaps  YuFruitfulll - meal prep and planning peggy (www.yummly.com)     Stress Management/Behavior/Mindful Eating  CALM meditation peggy (www.calm.com)  Headspace  Am I Hungry? Mindful eating virtual  peggy  Www.yourweightmatters.org - Obesity Action Coalition sponsored Blog posts daily  Motivation peggy (black box with white \")- daily supportive messages sent to your phone     Books/Video Education/Podcasts  Mindless Eating by Fadi Morrison  Why We Get Sick by Denny Mason (a book about insulin resistance)  Atomic Habits by Mir Wheeler (a book about taking small steps to promote greater behavior change)   Can't Hurt Me by Quang Taylor (a book exploring the power of discipline in achieving your goals)  The End of Dieting: How to Live for Life by Dr. Tomas Campbell M.D. or listen to The [employer] Podcast Episode 63: Understanding \"Nutritarian\" Eating w/Dr. Tomas Campbell  Your Body in Balance: The New Science of Food, Hormones, and Health by Dr. Filipe Galvan  The Menopause Diet Plan by Aubree Holman and Alexa Chowdhury  The Complete Guide to fasting by Dr. Walker  Sugar, Salt & Fat by Chaparrita Covarrubias, Ph.D, R.D.  Weight  Loss Surgery Will Not Treat Food Addiction by Ariella Mendoza Ph.D  The Game Changers- DiaDerma BVix Documentary on plant based nutrition  Fed Up - documentary about obesity (Free on Utube)  The Truth About Sugar - documentary on sugar (Free on Utube, https://youSermou.be/5Q9lnzaEX5w)  The Dr. Barger T5 Wellness Plan by Dr. Danish Barger MD  Fitlosophy Fitspiration - journal to better health (found at [employer] in fitness aisle)  What Happened to You?- a look at the impact trauma has on behavior written by Ernesto Valadez and Dr. Pierce Mora  Whole Again by Claude Baldwin - discovering your true self after trauma  John Mckeon talk on ActualSun, The Call to Courage  Podcasts: The Exam Room by the Physician's Committee, Nutrition Facts by Dr. Berg    We are here to support you with weight loss, but please remember that you still need your primary care provider for your routine health maintenance.

## 2024-04-18 NOTE — PROGRESS NOTES
HISTORY OF PRESENT ILLNESS  Chief Complaint   Patient presents with    Weight Check     -14     Tami Serrano is a 38 year old female here for follow up with medical weight loss program for the treatment of overweight, obesity, or morbid obesity.     Down 14 lbs (last f/u 8/2023)   Compliant on wegovy 2.4mg weekly (has been without for the past month- since was past due for an office appt)   Tolerating well, helping with decreasing appetite and no side effects     Admits that the past couple of months, haven't been that great... has been doing quick meals (fast food)- 2-3 times per week, and had to quit the gym (due to cost)- so hasn't been exercising like normal...   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 not tracking reports  Meals out per week on average: 2-3  Stress is manageable   Sleep: 8 hours/night, waking up feeling rested    Denies chest pain, shortness of breath, dizziness, blurred vision, headache, paresthesia, nausea/vomiting.     Breakfast Lunch Dinner Snacks Fluids   Reviewed              Wt Readings from Last 6 Encounters:   04/18/24 220 lb (99.8 kg)   02/07/24 209 lb 4.8 oz (94.9 kg)   02/07/24 209 lb 4 oz (94.9 kg)   01/11/24 213 lb (96.6 kg)   12/20/23 211 lb (95.7 kg)   11/28/23 217 lb (98.4 kg)          REVIEW OF SYSTEMS  GENERAL: feels well otherwise, denied any fevers chills or night sweats   LUNGS: denies shortness of breath  CARDIOVASCULAR: denies chest pain  GI: denies abdominal pain  MUSCULOSKELETAL: denies back pain, joint pains   PSYCH: denies change in behavior or mood, denies feeling sad or depressed    EXAM  /82   Pulse 82   Resp 16   Ht 5' 5\" (1.651 m)   Wt 220 lb (99.8 kg)   LMP 04/07/2024 (Exact Date)   BMI 36.61 kg/m²       GENERAL: well developed, well nourished, in no apparent distress, A/O x3  SKIN: no rashes, no  suspicious lesions  HEENT: atraumatic, normocephalic, OP-clear, PERRLA  NECK: supple, no adenopathy  LUNGS: CTA in all fields, breathing non labored  CARDIO: RRR without murmur  GI: +BS, NT/ND, no masses or HSM  EXTREMITIES: no cyanosis, no clubbing, no edema    Lab Results   Component Value Date    GLU 84 11/09/2023    BUN 9 11/09/2023    BUNCREA 12.0 10/11/2021    CREATSERUM 1.07 (H) 11/09/2023    ANIONGAP 2 11/09/2023     11/11/2017    GFRNAA 75 02/18/2022    GFRAA 86 02/18/2022    CA 9.3 11/09/2023    OSMOCALC 284 11/09/2023    ALKPHO 39 11/09/2023    AST 12 (L) 11/09/2023    ALT 15 11/09/2023    BILT 0.4 11/09/2023    TP 7.8 11/09/2023    ALB 3.8 11/09/2023    GLOBULIN 4.0 11/09/2023     11/09/2023    K 3.7 11/09/2023     11/09/2023    CO2 25.0 11/09/2023     Lab Results   Component Value Date     03/31/2022    A1C 5.2 03/31/2022     Lab Results   Component Value Date    CHOLEST 163 11/09/2023    TRIG 73 11/09/2023    HDL 45 11/09/2023     (H) 11/09/2023    VLDL 12 11/09/2023    NONHDLC 118 11/09/2023     Lab Results   Component Value Date    B12 385 03/31/2022     Lab Results   Component Value Date    VITD 21.6 (L) 03/31/2022       Current Outpatient Medications on File Prior to Visit   Medication Sig Dispense Refill    semaglutide-weight management (WEGOVY) 2.4 MG/0.75ML Subcutaneous Solution Auto-injector Inject 0.75 mL (2.4 mg total) into the skin once a week. 3 mL 0    Valsartan-hydroCHLOROthiazide 160-25 MG Oral Tab Take 1 tablet by mouth daily. 30 tablet 0     No current facility-administered medications on file prior to visit.       ASSESSMENT/PLAN    ICD-10-CM    1. Obesity (BMI 30-39.9)  E66.9       2. Therapeutic drug monitoring  Z51.81       3. Benign essential hypertension  I10       4. Anxiety and depression  F41.9     F32.A       5. APRIL on CPAP  G47.33       6. Snoring  R06.83           PLAN   Initial Weight Data and Goal Weight Loss:  Initial consult: #252 lbs on  3/2022  Weight Calculations  Initial Weight: 252 lbs  Initial Weight Date: 03/01/22  Today's Weight: 220 lbs  5% Goal: 12.6  10% Goal: 25.2  Total Weight Loss: 32 lbs  Total weight loss: Down 14 lbs total, Net loss 32 lbs  Continue with medications: wegovy 2.4mg weekly   --advised of side effects and adverse effects of this medication  Contradictions: has done phentermine in the past, will avoid due to high BPs, saxenda   Reviewed labs  Will continue with high dose vitamin d   Will continue with vitamin b12 OTC  HTN stable, managed by PCP  Anxiety/depression, stable  APRIL- has orders pended for sleep study  Discussed 5-10% weight loss and needs to lose more weight  Encouraged to try to get back in physical activity   Nutrition: Low carb diet, recommended to eat breakfast daily/ regular protein intake  Follow up with dietitian and psychologist as recommended.  Discussed the role of sleep and stress in weight management.  Counseled on comprehensive weight loss plan including attention to nutrition, exercise and behavior/stress management for success. See patient instruction below for more details.  Discussed strategies to overcome barriers to successful weight loss and weight maintenance  FITTE: ACSM recommendations (150-300 minutes/ week in active weight loss)   Weight Loss Consent to treat reviewed and signed.    Total time spent on chart review, pre-charting, obtaining history, counseling, and educating, reviewing labs was 30 minutes.       NOTE TO PATIENT: The 21st Century Cures Act makes clinical notes like these available to patients in the interest of transparency. Clinical notes are medical documents used by physicians and care providers to communicate with each other. These documents include medical language and terminology, abbreviations, and treatment information that may sound technical and at times possibly unfamiliar. In addition, at times, the verbiage may appear blunt or direct. These documents are one  tool providers use to communicate relevant information and clinical opinions of the care providers in a way that allows common understanding of the clinical context.     There are no Patient Instructions on file for this visit.    No follow-ups on file.    Patient verbalizes understanding.    Sally Guerrero, APRN

## 2024-04-19 NOTE — TELEPHONE ENCOUNTER
From: Tami Serrano  To: Sally Guerrero  Sent: 4/18/2024 3:47 PM CDT  Subject: Wegovy     I went to get the medicine and they need a prior authorization to cover it now

## 2024-05-08 DIAGNOSIS — D50.0 IRON DEFICIENCY ANEMIA SECONDARY TO BLOOD LOSS (CHRONIC): Primary | ICD-10-CM

## 2024-05-09 ENCOUNTER — NURSE ONLY (OUTPATIENT)
Dept: HEMATOLOGY/ONCOLOGY | Age: 39
End: 2024-05-09
Attending: SPECIALIST
Payer: COMMERCIAL

## 2024-05-09 DIAGNOSIS — D50.0 IRON DEFICIENCY ANEMIA SECONDARY TO BLOOD LOSS (CHRONIC): ICD-10-CM

## 2024-05-09 LAB
BASOPHILS # BLD AUTO: 0.06 X10(3) UL (ref 0–0.2)
BASOPHILS NFR BLD AUTO: 0.8 %
DEPRECATED HBV CORE AB SER IA-ACNC: 6.4 NG/ML
EOSINOPHIL # BLD AUTO: 0.31 X10(3) UL (ref 0–0.7)
EOSINOPHIL NFR BLD AUTO: 3.9 %
ERYTHROCYTE [DISTWIDTH] IN BLOOD BY AUTOMATED COUNT: 15.9 %
HCT VFR BLD AUTO: 35.6 %
HGB BLD-MCNC: 10.9 G/DL
IMM GRANULOCYTES # BLD AUTO: 0.02 X10(3) UL (ref 0–1)
IMM GRANULOCYTES NFR BLD: 0.3 %
IRON SATN MFR SERPL: 5 %
IRON SERPL-MCNC: 20 UG/DL
LYMPHOCYTES # BLD AUTO: 3.74 X10(3) UL (ref 1–4)
LYMPHOCYTES NFR BLD AUTO: 47 %
MCH RBC QN AUTO: 24.7 PG (ref 26–34)
MCHC RBC AUTO-ENTMCNC: 30.6 G/DL (ref 31–37)
MCV RBC AUTO: 80.5 FL
MONOCYTES # BLD AUTO: 0.52 X10(3) UL (ref 0.1–1)
MONOCYTES NFR BLD AUTO: 6.5 %
NEUTROPHILS # BLD AUTO: 3.31 X10 (3) UL (ref 1.5–7.7)
NEUTROPHILS # BLD AUTO: 3.31 X10(3) UL (ref 1.5–7.7)
NEUTROPHILS NFR BLD AUTO: 41.5 %
PLATELET # BLD AUTO: 281 10(3)UL (ref 150–450)
RBC # BLD AUTO: 4.42 X10(6)UL
TIBC SERPL-MCNC: 411 UG/DL (ref 240–450)
TRANSFERRIN SERPL-MCNC: 276 MG/DL (ref 200–360)
WBC # BLD AUTO: 8 X10(3) UL (ref 4–11)

## 2024-05-09 PROCEDURE — 82728 ASSAY OF FERRITIN: CPT

## 2024-05-09 PROCEDURE — 36415 COLL VENOUS BLD VENIPUNCTURE: CPT

## 2024-05-09 PROCEDURE — 83540 ASSAY OF IRON: CPT

## 2024-05-09 PROCEDURE — 83550 IRON BINDING TEST: CPT

## 2024-05-09 PROCEDURE — 85025 COMPLETE CBC W/AUTO DIFF WBC: CPT

## 2024-05-10 ENCOUNTER — TELEPHONE (OUTPATIENT)
Dept: HEMATOLOGY/ONCOLOGY | Facility: HOSPITAL | Age: 39
End: 2024-05-10

## 2024-05-10 NOTE — TELEPHONE ENCOUNTER
Patient notified that Dr. Jarquin reviewed her labs and she is iron deficient.  He has ordered an iron infusion and she will get a call to schedule the infusion and MD or APN visit once this is approved by her insurance.  Patient agrees to this plan.

## 2024-05-13 ENCOUNTER — TELEPHONE (OUTPATIENT)
Dept: HEMATOLOGY/ONCOLOGY | Facility: HOSPITAL | Age: 39
End: 2024-05-13

## 2024-05-20 NOTE — PROGRESS NOTES
Cancer Center Progress Note    Patient Name: Tami Serrano   YOB: 1985   Medical Record Number: [id number]   CSN: [id number]   Date of visit: 2024       Chief Complaint/Reason for Visit:  Chief Complaint   Patient presents with    Follow - Up    Infusion        History of Present Illness:   Tami Serrano is a 38 year old patient of Dr. Arriaza with recurrent iron deficiency anemia. She presents today for IV iron infusion.   She is feeling increased fatigue and irritable. She reports ongoing heavy menstraul cycles, usually for about 5 days before it starts to slow. Has not seen gyne for this yet. She denies any shortness of breath or chest pain.   She denies any other concerns  Blood pressure noted to be high in the office, pt reports she is not taking her blood pressure medication as vitals have been normal at home. I advised to recheck her blood pressure and restart her medication. She is asymptomatic.         Problem List:  Patient Active Problem List   Diagnosis    APRIL on CPAP    Benign essential hypertension    Morbid obesity (HCC)    S/P  section    Anxiety and depression    Anaclitic depression    Cyst of ovary    Pre-eclampsia (HCC)    Eczema    Menorrhagia    Iron deficiency anemia secondary to blood loss (chronic)        Medical History:  Past Medical History:    Allergic rhinitis    Hypertension in pregnancy, preeclampsia (HCC)    Morbid obesity with BMI of 40.0-44.9, adult (HCC)    Obesity    Obstructive apnea    DMG SPLIT AHI 47 SaO2  alonso 85% CPAP 6-16 HME    Obstructive sleep apnea    Sleep apnea    Unspecified essential hypertension    Visual impairment       Surgical History:  Past Surgical History:   Procedure Laterality Date    Anesth, section             delivery only      Tubal ligation         Allergies:  Allergies   Allergen Reactions    Seasonal OTHER (SEE COMMENTS)       Family History:  Family History   Problem Relation Age  of Onset    Diabetes Father     Hypertension Mother     Cancer Paternal Grandfather         skin       Social History:  Social History     Socioeconomic History    Marital status: Single     Spouse name: Not on file    Number of children: Not on file    Years of education: Not on file    Highest education level: Not on file   Occupational History    Not on file   Tobacco Use    Smoking status: Former     Current packs/day: 0.00     Types: Cigarettes     Start date: 9/10/2005     Quit date: 10/25/2017     Years since quittin.5     Passive exposure: Never    Smokeless tobacco: Never   Vaping Use    Vaping status: Every Day    Substances: Nicotine, THC    Devices: Disposable   Substance and Sexual Activity    Alcohol use: Yes     Alcohol/week: 3.0 standard drinks of alcohol     Types: 1 Glasses of wine, 2 Shots of liquor per week     Comment: Weekly    Drug use: Yes     Frequency: 1.0 times per week     Types: Cannabis     Comment: daily    Sexual activity: Not on file   Other Topics Concern    Caffeine Concern No    Exercise No    Seat Belt No    Special Diet No    Stress Concern No    Weight Concern Yes   Social History Narrative    Not on file     Social Determinants of Health     Financial Resource Strain: Not on file   Food Insecurity: Not on file   Transportation Needs: Not on file   Physical Activity: Not on file   Stress: Not on file   Social Connections: Not on file   Housing Stability: At Risk (2023)    [facility]     Living Situation: Not on file     Housing Problems: Not on file       Medications:    Current Outpatient Medications:     semaglutide-weight management (WEGOVY) 2.4 MG/0.75ML Subcutaneous Solution Auto-injector, Inject 0.75 mL (2.4 mg total) into the skin once a week., Disp: 3 mL, Rfl: 2    Valsartan-hydroCHLOROthiazide 160-25 MG Oral Tab, Take 1 tablet by mouth daily., Disp: 30 tablet, Rfl: 0    Review of Systems:  A comprehensive 14 point review of  systems was completed.  Pertinent positives and negatives noted in the HPI.    Performance Status: ECOG 0 - Fully active, able to carry on all predisease activities without restrictions.      Physical Examination:  Vital Signs: Height: 162.6 cm (5' 4.02\") (05/21 1321)  Weight: 97.5 kg (215 lb) (05/21 1321)  BSA (Calculated - sq m): 2.02 sq meters (05/21 1321)  Pulse: 69 (05/21 1321)  BP: 162/112 (05/21 1321)  Temp: 98.6 °F (37 °C) (05/21 1321)  Do Not Use - Resp Rate: --  SpO2: 98 % (05/21 1321)    General: Patient is alert and oriented x 3, not in acute distress.  Chest: Clear to auscultation. Respirations unlabored.   Heart: Regular rate and rhythm.   Abdomen: Soft, non-distended, non-tender with present bowel sounds.  Extremities: No edema.  Neurological: Grossly intact.   Skin: warm, dry, no erythema or rash   Psych/Depression: mood and affect are appropriate.     Labs:   No results found for this or any previous visit (from the past 72 hour(s)).    Impression/Plan    Iron deficiency anemia:  Recommended gastroenterology evaluation previously.   Due to menstrual losses, no other signs of bleeding  Proceed with IV iron today as planned, has tolerated IV iron in the past without complication.    Menorrhagia:  Follow up with gynecology advised     Hypertension:  Prescribed valsartan-hydrochlorothiazide, pt is not currently taking it as she says blood pressure has been normal at home.  Advised to retake blood pressure and restart medication, blood pressure 162/112 in the office today. She is asymptomatic. Reviewed alarming symptoms, and discussed importance of blood pressure control.     Planned Follow Up:  repeat labs in 3 months    The 21st Century Cures Act makes medical notes like these available to patients in the interest of transparency. Please be advised this is a medical document. Medical documents are intended to carry relevant information, facts as evident, and the clinical opinion of the practitioner.  The medical note is intended as peer to peer communication and may appear blunt or direct. It is written in medical language and may contain abbreviations or verbiage that are unfamiliar.     Electronically Signed by:    JOHANA Raza-BC  Nurse Practitioner  [facility]

## 2024-05-21 ENCOUNTER — OFFICE VISIT (OUTPATIENT)
Dept: HEMATOLOGY/ONCOLOGY | Age: 39
End: 2024-05-21
Attending: SPECIALIST

## 2024-05-21 VITALS
BODY MASS INDEX: 36.7 KG/M2 | HEIGHT: 64.02 IN | OXYGEN SATURATION: 98 % | WEIGHT: 215 LBS | TEMPERATURE: 99 F | SYSTOLIC BLOOD PRESSURE: 162 MMHG | RESPIRATION RATE: 18 BRPM | DIASTOLIC BLOOD PRESSURE: 112 MMHG | HEART RATE: 69 BPM

## 2024-05-21 VITALS
SYSTOLIC BLOOD PRESSURE: 182 MMHG | RESPIRATION RATE: 16 BRPM | OXYGEN SATURATION: 99 % | HEART RATE: 73 BPM | DIASTOLIC BLOOD PRESSURE: 131 MMHG

## 2024-05-21 DIAGNOSIS — D50.0 IRON DEFICIENCY ANEMIA SECONDARY TO BLOOD LOSS (CHRONIC): Primary | ICD-10-CM

## 2024-05-21 DIAGNOSIS — I10 BENIGN ESSENTIAL HYPERTENSION: ICD-10-CM

## 2024-05-21 DIAGNOSIS — N92.0 MENORRHAGIA WITH REGULAR CYCLE: ICD-10-CM

## 2024-05-21 PROCEDURE — 99214 OFFICE O/P EST MOD 30 MIN: CPT | Performed by: NURSE PRACTITIONER

## 2024-05-21 PROCEDURE — 96365 THER/PROPH/DIAG IV INF INIT: CPT

## 2024-05-21 NOTE — PROGRESS NOTES
Education Record    Learner:  Patient    Disease / Diagnosis: here for infed    Barriers / Limitations:  None    Method:  Brief focused, printed material and  reinforcement    General Topics:  Plan of care reviewed    Outcome:  patient ambulatory. No complaints. Tolerated infusion. BP elevated. Asymptomatic. States she is not taking her BP med and that she does monitor her BP at home and it is \"normal\" when she checks it. Encouraged patient to take her BP med and to go to the ER if having headaches, vision changes, chest pain or shortness of breath. Shows understanding and that she will take her BP med when she gets home. Discharged in stable condition States she gets her appt from [facility].

## 2024-05-21 NOTE — PROGRESS NOTES
Education Record    Learner:  Patient    Disease / Diagnosis:EDDIE    Barriers / Limitations:  None   Comments:    Method:  Discussion   Comments:    General Topics:  Plan of care reviewed   Comments:    Outcome:  Shows understanding   Comments: 3 month f/up and iron infusion. Pt has iron 3 months ago and noticed an improvement. States she is now feeling fatigued. Denies any SOB, chest pain or dizziness. States her periods are heavy, has not yet made apt with gyne.

## 2024-06-24 ENCOUNTER — OFFICE VISIT (OUTPATIENT)
Dept: INTERNAL MEDICINE CLINIC | Facility: CLINIC | Age: 39
End: 2024-06-24

## 2024-06-24 VITALS
HEIGHT: 65 IN | SYSTOLIC BLOOD PRESSURE: 162 MMHG | WEIGHT: 211 LBS | BODY MASS INDEX: 35.16 KG/M2 | RESPIRATION RATE: 16 BRPM | HEART RATE: 80 BPM | DIASTOLIC BLOOD PRESSURE: 100 MMHG

## 2024-06-24 DIAGNOSIS — F41.9 ANXIETY AND DEPRESSION: ICD-10-CM

## 2024-06-24 DIAGNOSIS — F32.A ANXIETY AND DEPRESSION: ICD-10-CM

## 2024-06-24 DIAGNOSIS — Z51.81 THERAPEUTIC DRUG MONITORING: Primary | ICD-10-CM

## 2024-06-24 DIAGNOSIS — G47.33 OSA ON CPAP: ICD-10-CM

## 2024-06-24 DIAGNOSIS — I10 BENIGN ESSENTIAL HYPERTENSION: ICD-10-CM

## 2024-06-24 DIAGNOSIS — E66.9 OBESITY (BMI 30-39.9): ICD-10-CM

## 2024-06-24 DIAGNOSIS — R06.83 SNORING: ICD-10-CM

## 2024-06-24 PROCEDURE — 3080F DIAST BP >= 90 MM HG: CPT | Performed by: NURSE PRACTITIONER

## 2024-06-24 PROCEDURE — 99214 OFFICE O/P EST MOD 30 MIN: CPT | Performed by: NURSE PRACTITIONER

## 2024-06-24 PROCEDURE — 3077F SYST BP >= 140 MM HG: CPT | Performed by: NURSE PRACTITIONER

## 2024-06-24 PROCEDURE — 3008F BODY MASS INDEX DOCD: CPT | Performed by: NURSE PRACTITIONER

## 2024-06-24 RX ORDER — SEMAGLUTIDE 2.4 MG/.75ML
2.4 INJECTION, SOLUTION SUBCUTANEOUS WEEKLY
Qty: 3 ML | Refills: 3 | Status: SHIPPED | OUTPATIENT
Start: 2024-06-24

## 2024-06-24 NOTE — PATIENT INSTRUCTIONS
Next steps:  1.  Fill your prescribed medication and take as discussed and prescribed: wegovy 2.4mg weekly   2.  Schedule a personal nutrition consultation with one of our registered dieticians  3. Start taking blood pressure medication and watch BPs at home (if numbers >150/90s contact PCP or us)      Please try to work on the following dietary changes:  Daily protein recommendation to start:  grams  Daily carbohydrate: <125g  Daily calories: 1,400-1,500  1.  Drink water with meals and throughout the day, cut down on soda and/or juice if consumed. Consider flavored water options like Bubbly, Spindrift, Hint and Ines.  2.  Eat breakfast daily and focus on having protein with each meal, examples include: greek yogurt, cottage cheese, hard boiled egg, whole grain toast with peanut butter.   3.  Reduce refined carbohydrates and sugars which includes items such as sweets, as well as rice, pasta, and bread and make sure to choose whole grain options when having them with just 1 serving per meal about the size of your inner palm.  4.  Consume non starchy veggies daily working towards making them a good 50% of your daily food intake. Add them to lunch and dinner consistently.  5.  Start a daily probiotic: VSL#3 is recommended, (order on line at www.vsl3.com). Take 1 capsule daily with water for 30 days, then reduce to 1 every other day (this will reduce the cost). Capsules can be left out for 2 weeks, but then must be refrigerated.      Please download courtney My Fitness Pal, LoseIt! Or Net Diary to monitor daily dietary intake and you will be able to see if you are eating the right amount of calories or too much or too little which would hinder weight loss. Additionally this will help to see your daily carbohydrate and protein intake. When you set the courtney up choose 1-2 lbs/week as a goal.  Keeping a paper food journal is an option as well to remain accountable for your choices- this is the start to mindful eating! A  low calorie diet has been consistently shown to support weight loss.     Continue or start exercising to help establish a routine. If not already exercising begin with 1 day and progress as able with long-term goal of 30 minutes 5 days a week at a minimum.     Meditation daily can help manage and control stress. Chronic stress can make weight loss difficult.  Exercising is one way to help with stress, but meditation using the CALM Peggy or another comparable alternative can be done in your home or place of work with little time commitment. This Peggy can also help work on behavior change and improve sleep. Check out the segment under Calm Masterclass and listen to The 4 Pillars of Health. A great way to begin learning about the foundation of lifestyle with practical tips to use in your every day.     Check out www.yourweightmatters.org blog for continued daily support and education along this weight loss journey!    Patient Resources:     Personal Training/Fitness Classes/Health Coaching     [facility] @ [email or web address] Full fitness center with group fitness and personal training. Discount available as client of [facility].  Health Coaching and Personal Training with Rosalee Vivar at our [facility]- individual weekly coaching with option to add personal training and small group fitness classes targeted at weight loss- [phone] and/or email @ [email or web address]  360FIT [place] [email or web address] Group Fitness [phone] and/or email Kim at [email or web address]  [facility] Fitness Centers with multiple locations: Nanotron [employer] (www.iViZ Security), Eat The Frog Fitness (www.RiverMeadow Software.Divide), Fit Body Bootcamp (www.LUMI MaskbodyboBridestoryp.Divide), Desert Biker Magazine Fitness (www.TeamVisibility), The Exercise  (www.exercisecoach.Divide)     Online Fitness  Fitness  on  Utube  Fit in 10 DVD series- www.irinz95MVF.com  Sit and Be Fit - Chair exercise series Www.sitandbefit.org  Hip Hop Fit with Chicho Mc at www.hiphopfit.net     Apps for on the Go Fitness  Riverside 7 Minute Workout (orange box with white 7) - free on the go HIIT training peggy  Peloton Peggy @ www.onepeloton.com     Nutrition Trackers and Tools  LoseIT! And My Fitness Pal apps and on line for tracking nutrition  NOOM - virtual health coaching  FitFoundation (healthy meals on the go) in [place] @ www.zyyovoeawzqhi5sHyperQuest  Bistro MD @ www.bistromd.com and Bptcrd89 (keto and low carb plans recommended) @ www.itkyyd81.com, Metabolic Meals @ www.Marucci SportsMetabolicMeals.com - individual prepared meals to go  Gobble, Blue Apron, Home , Every Plate, Sunbasket- on line meal delivery programs for preparation at home  Meal Village in [place] for homemade meals to go @ www.mealvillage.DxNA  Diet Doctor @ www.dietdoctor.com - low carb swaps  Yummly - meal prep and planning peggy (www.yummly.com)     Stress Management/Behavior/Mindful Eating  CALM meditation peggy (www.calm.com)  Headspace  Am I Hungry? Mindful eating virtual  peggy  Www.yourweightmatters.org - Obesity Action Coalition sponsored Blog posts daily  Motivation peggy (black box with white \")- daily supportive messages sent to your phone     Books/Video Education/Podcasts  Mindless Eating by Fadi Morrison  Why We Get Sick by Denny Mason (a book about insulin resistance)  Atomic Habits by Mir Wheeler (a book about taking small steps to promote greater behavior change)   Can't Hurt Me by Quang Taylor (a book exploring the power of discipline in achieving your goals)  The End of Dieting: How to Live for Life by Dr. Tomas Campbell M.D. or listen to The The fresh Group Podcast Episode 63: Understanding \"Nutritarian\" Eating w/Dr. Toams Campbell  Your Body in Balance: The New Science of Food, Hormones, and Health by Dr. Filipe Galvan  The Menopause Diet Plan by Aubree Holman and  Alexa Chowdhury  The Complete Guide to fasting by Dr. Walker  Sugar, Salt & Fat by Chaparrita Covarrubias, Ph.D, R.D.  Weight Loss Surgery Will Not Treat Food Addiction by Ariella Mendoza Ph.D  The Game Changers- SurgeonKidzix Documentary on plant based nutrition  Fed Up - documentary about obesity (Free on Utube)  The Truth About Sugar - documentary on sugar (Free on Utube, https://youtu.be/5Q0baqhAO7g)  The Dr. Barger T5 Wellness Plan by Dr. Dainsh Barger MD  Fitlosophy Fitspiration - journal to better health (found at [employer] in fitness aisle)  What Happened to You?- a look at the impact trauma has on behavior written by Ernesto Valadez and Dr. Pierce Mora  Whole Again by Claude Baldwin - discovering your true self after trauma  John Mckeon talk on Symptify, The Call to Courage  Podcasts: The Exam Room by the Physician's Committee, Nutrition Facts by Dr. Berg    We are here to support you with weight loss, but please remember that you still need your primary care provider for your routine health maintenance.

## 2024-06-24 NOTE — PROGRESS NOTES
HISTORY OF PRESENT ILLNESS  Chief Complaint   Patient presents with    Weight Check     -9       Tami Serrano is a 38 year old female here for follow up with medical weight loss program for the treatment of overweight, obesity, or morbid obesity.     Down 9 lbs  Compliant on wegovy 2.4mg weekly   Tolerating well, helping with decreasing appetite and no side effects     Is going to the gym now- 4-5 days per week (mix of cardio and strength)   Has been trying to get in more protein   Still doing smaller portions   Exercise/Activity: 4-5x/ week, mix of cardio and strength training   Nutrition: eating regular meals, +protein, minimal veggies. not tracking reports  Meals out per week on average: 1  Stress is manageable- 10/10    Sleep: 7 hours/night, waking up feeling rested    Denies chest pain, shortness of breath, dizziness, blurred vision, headache, paresthesia, nausea/vomiting.     Breakfast Lunch Dinner Snacks Fluids   Reviewed              Wt Readings from Last 6 Encounters:   06/24/24 211 lb (95.7 kg)   05/21/24 215 lb (97.5 kg)   04/18/24 220 lb (99.8 kg)   02/07/24 209 lb 4.8 oz (94.9 kg)   02/07/24 209 lb 4 oz (94.9 kg)   01/11/24 213 lb (96.6 kg)          REVIEW OF SYSTEMS  GENERAL: feels well otherwise, denied any fevers chills or night sweats   LUNGS: denies shortness of breath  CARDIOVASCULAR: denies chest pain  GI: denies abdominal pain  MUSCULOSKELETAL: denies back pain, joint pains   PSYCH: denies change in behavior or mood, denies feeling sad or depressed    EXAM  BP (!) 162/100   Pulse 80   Resp 16   Ht 5' 5\" (1.651 m)   Wt 211 lb (95.7 kg)   LMP 06/02/2024 (Exact Date)   BMI 35.11 kg/m²       GENERAL: well developed, well nourished, in no apparent distress, A/O x3  SKIN: no rashes, no suspicious lesions  HEENT: atraumatic, normocephalic, OP-clear, PERRLA  NECK: supple, no adenopathy  LUNGS: CTA in all fields, breathing non labored  CARDIO: RRR without murmur  GI: +BS, NT/ND, no masses  or HSM  EXTREMITIES: no cyanosis, no clubbing, no edema    Lab Results   Component Value Date    GLU 84 11/09/2023    BUN 9 11/09/2023    BUNCREA 12.0 10/11/2021    CREATSERUM 1.07 (H) 11/09/2023    ANIONGAP 2 11/09/2023     11/11/2017    GFRNAA 75 02/18/2022    GFRAA 86 02/18/2022    CA 9.3 11/09/2023    OSMOCALC 284 11/09/2023    ALKPHO 39 11/09/2023    AST 12 (L) 11/09/2023    ALT 15 11/09/2023    BILT 0.4 11/09/2023    TP 7.8 11/09/2023    ALB 3.8 11/09/2023    GLOBULIN 4.0 11/09/2023     11/09/2023    K 3.7 11/09/2023     11/09/2023    CO2 25.0 11/09/2023     Lab Results   Component Value Date     03/31/2022    A1C 5.2 03/31/2022     Lab Results   Component Value Date    CHOLEST 163 11/09/2023    TRIG 73 11/09/2023    HDL 45 11/09/2023     (H) 11/09/2023    VLDL 12 11/09/2023    NONHDLC 118 11/09/2023     Lab Results   Component Value Date    B12 385 03/31/2022     Lab Results   Component Value Date    VITD 21.6 (L) 03/31/2022       Current Outpatient Medications on File Prior to Visit   Medication Sig Dispense Refill    Valsartan-hydroCHLOROthiazide 160-25 MG Oral Tab Take 1 tablet by mouth daily. 30 tablet 0     No current facility-administered medications on file prior to visit.       ASSESSMENT/PLAN    ICD-10-CM    1. Therapeutic drug monitoring  Z51.81 semaglutide-weight management (WEGOVY) 2.4 MG/0.75ML Subcutaneous Solution Auto-injector      2. Obesity (BMI 30-39.9)  E66.9 semaglutide-weight management (WEGOVY) 2.4 MG/0.75ML Subcutaneous Solution Auto-injector      3. Benign essential hypertension  I10       4. APRIL on CPAP  G47.33       5. Snoring  R06.83       6. Anxiety and depression  F41.9     F32.A           PLAN   Initial Weight Data and Goal Weight Loss:  Initial consult: #252 lbs on 3/2022  Weight Calculations  Initial Weight: 252 lbs  Initial Weight Date: 03/01/22  Today's Weight: 211 lbs  5% Goal: 12.6  10% Goal: 25.2  Total Weight Loss: 41 lbs  Total weight  loss: Down 9 lbs total, Net loss 41 lbs  Continue with medications: wegovy 2.4mg weekly   --advised of side effects and adverse effects of this medication  Contradictions: has done phentermine in the past, will avoid due to high BPs, saxenda   Reviewed labs  Will continue with high dose vitamin d   Will continue with vitamin b12 OTC  HTN uncontrolled, repeat BP was 162/100, admits that she hasn't been taking BP medications regularly, will start taking meds and watch BP, will send message to PCP  Anxiety/depression, stable  APRIL- has orders pended for sleep study  Great work with getting in physical activity!   Nutrition: Low carb diet, recommended to eat breakfast daily/ regular protein intake  Follow up with dietitian and psychologist as recommended.  Discussed the role of sleep and stress in weight management.  Counseled on comprehensive weight loss plan including attention to nutrition, exercise and behavior/stress management for success. See patient instruction below for more details.  Discussed strategies to overcome barriers to successful weight loss and weight maintenance  FITTE: ACSM recommendations (150-300 minutes/ week in active weight loss)   Weight Loss Consent to treat reviewed and signed.    Total time spent on chart review, pre-charting, obtaining history, counseling, and educating, reviewing labs was 30 minutes.       NOTE TO PATIENT: The 21st Century Cures Act makes clinical notes like these available to patients in the interest of transparency. Clinical notes are medical documents used by physicians and care providers to communicate with each other. These documents include medical language and terminology, abbreviations, and treatment information that may sound technical and at times possibly unfamiliar. In addition, at times, the verbiage may appear blunt or direct. These documents are one tool providers use to communicate relevant information and clinical opinions of the care providers in a way  that allows common understanding of the clinical context.     There are no Patient Instructions on file for this visit.    No follow-ups on file.    Patient verbalizes understanding.    Sally Guerrero, APRN

## 2024-07-14 ENCOUNTER — APPOINTMENT (OUTPATIENT)
Dept: GENERAL RADIOLOGY | Age: 39
End: 2024-07-14
Attending: EMERGENCY MEDICINE
Payer: COMMERCIAL

## 2024-07-14 ENCOUNTER — HOSPITAL ENCOUNTER (EMERGENCY)
Age: 39
Discharge: HOME | End: 2024-07-14
Attending: EMERGENCY MEDICINE
Payer: COMMERCIAL

## 2024-07-14 VITALS
HEIGHT: 64 IN | WEIGHT: 206 LBS | BODY MASS INDEX: 35.17 KG/M2 | HEART RATE: 83 BPM | RESPIRATION RATE: 22 BRPM | SYSTOLIC BLOOD PRESSURE: 156 MMHG | DIASTOLIC BLOOD PRESSURE: 90 MMHG | TEMPERATURE: 98 F | OXYGEN SATURATION: 95 %

## 2024-07-14 DIAGNOSIS — S29.011A MUSCLE STRAIN OF CHEST WALL, INITIAL ENCOUNTER: Primary | ICD-10-CM

## 2024-07-14 PROCEDURE — 71101 X-RAY EXAM UNILAT RIBS/CHEST: CPT | Performed by: EMERGENCY MEDICINE

## 2024-07-14 PROCEDURE — 99284 EMERGENCY DEPT VISIT MOD MDM: CPT

## 2024-07-14 PROCEDURE — 99283 EMERGENCY DEPT VISIT LOW MDM: CPT

## 2024-07-14 PROCEDURE — 96372 THER/PROPH/DIAG INJ SC/IM: CPT

## 2024-07-14 RX ORDER — KETOROLAC TROMETHAMINE 30 MG/ML
60 INJECTION, SOLUTION INTRAMUSCULAR; INTRAVENOUS ONCE
Status: COMPLETED | OUTPATIENT
Start: 2024-07-14 | End: 2024-07-14

## 2024-07-14 NOTE — ED PROVIDER NOTES
Patient Seen in: [facility] [place]      History     Chief Complaint   Patient presents with    Pain     Stated Complaint: left rib pain - doing laudry felt a pop    Subjective:   HPI    Patient is a 38-year-old female presents emergency room with a history of bending over to  a laundry bag today while doing laundry and picked up a heavy bag and felt a pop in her left ribs.  The patient has pain which is well localized to the left ribs only at this time.  The patient denies history of any fall or direct trauma to the ribs.  The patient denies abdominal pain or back pain.  The patient denies history of any blood thinner use.  The patient denies history of any other somatic complaints or discomfort at this time.  Patient's pain is sharp and well localized to the left side of the ribs at this time.    Objective:   Past Medical History:    Allergic rhinitis    Hypertension in pregnancy, preeclampsia (HCC)    Morbid obesity with BMI of 40.0-44.9, adult ([facility])    Obesity    Obstructive apnea    DMG SPLIT AHI 47 SaO2  alonso 85% CPAP 6-16 HME    Obstructive sleep apnea    Sleep apnea    Unspecified essential hypertension    Visual impairment              Past Surgical History:   Procedure Laterality Date    Anesth, section             delivery only      Tubal ligation                  Social History     Socioeconomic History    Marital status: Single   Tobacco Use    Smoking status: Former     Current packs/day: 0.00     Types: Cigarettes     Start date: 9/10/2005     Quit date: 10/25/2017     Years since quittin.7     Passive exposure: Never    Smokeless tobacco: Never   Vaping Use    Vaping status: Every Day    Substances: Nicotine, THC    Devices: Disposable   Substance and Sexual Activity    Alcohol use: Yes     Alcohol/week: 3.0 standard drinks of alcohol     Types: 1 Glasses of wine, 2 Shots of liquor per week     Comment: Weekly    Drug use: Yes      Frequency: 1.0 times per week     Types: Cannabis     Comment: daily   Other Topics Concern    Caffeine Concern No    Exercise No    Seat Belt No    Special Diet No    Stress Concern No    Weight Concern Yes     Social Determinants of Health      [facility]              Review of Systems    Positive for stated Chief Complaint: Pain    Other systems are as noted in HPI.  Constitutional and vital signs reviewed.      All other systems reviewed and negative except as noted above.    Physical Exam     ED Triage Vitals [07/14/24 1150]   /90   Pulse 83   Resp 22   Temp 98.3 °F (36.8 °C)   Temp src Oral   SpO2 95 %   O2 Device None (Room air)       Current Vitals:   Vital Signs  BP: 156/90  Pulse: 83  Resp: 22  Temp: 98.3 °F (36.8 °C)  Temp src: Oral    Oxygen Therapy  SpO2: 95 %  O2 Device: None (Room air)            Physical Exam    GENERAL: Well-developed, well-nourished female sitting up breathing easily in no apparent distress.  Patient is nontoxic in appearance.  HEENT: Head is normocephalic, atraumatic. Pupils are 4 mm equally round and reactive to light. Oropharynx is clear. Mucous membranes are moist.  NECK: There is no focal tenderness to palpation appreciated.  LUNGS: Clear to auscultation bilaterally with no wheeze. There is good equal air entry bilaterally.  HEART: Regular rate and rhythm. Normal S1, S2 no S3, or S4. No murmur.  CHEST: There is focal tenderness to palpation on the left side of the chest wall which reproduces patient's symptoms.  ABDOMEN: There is no focal tenderness to palpation appreciated anywhere throughout the abdomen. There is no guarding, no rebound, no mass, and no organomegaly appreciated. There is normoactive bowel sounds.   NEURO: Patient is awake, alert and oriented to time place and person. Motor strength is 5 over 5 in all 4 extremities. There are no gross motor or sensory deficits appreciated.  Patient is up and ambulatory in the ER with a  steady gait and no ataxia.        ED Course   Labs Reviewed - No data to display  I personally reviewed the patient's left rib x-rays and my individual interpretation shows no evidence of any acute fracture or pneumothorax.  I also reviewed the official radiology report which showed results as noted below.        XR RIBS WITH CHEST (3 VIEWS), LEFT  (CPT=71101)    Result Date: 7/14/2024  CONCLUSION:  No evidence of a left rib fracture.   LOCATION:  [place] [address]     Dictated by (CST): Keven Combs MD on 7/14/2024 at 12:18 PM     Finalized by (CST): Keven Combs MD on 7/14/2024 at 12:19 PM               MDM      Patient had x-rays done as noted above.  Differential diagnosis considered myself includes acute rib fracture, acute pneumothorax, acute chest wall strain.  The patient will be treated with anti-inflammatories for home.  She was given Toradol for symptoms here in the ER.  The patient instructed to ice and rest areas of pain at home to return to the ER immediately if symptoms worsen or if any other problems arise.  Patient discharged home at this time.                                   Medical Decision Making      Disposition and Plan     Clinical Impression:  1. Muscle strain of chest wall, initial encounter         Disposition:  Discharge  7/14/2024 12:51 pm    Follow-up:  Alvina Manley MD  [address] [place] [address]  [phone]    Follow up in 2 day(s)            Medications Prescribed:  Current Discharge Medication List

## 2024-07-14 NOTE — ED INITIAL ASSESSMENT (HPI)
39 y/o F arrives to ED with c/o left rib pain while doing laundry within the last two hours. Patient reports she picked a heavy bag up and felt a pop. Patient denies any other trauma or injury to area.

## 2024-10-09 DIAGNOSIS — Z51.81 THERAPEUTIC DRUG MONITORING: ICD-10-CM

## 2024-10-09 DIAGNOSIS — E66.9 OBESITY (BMI 30-39.9): ICD-10-CM

## 2024-10-10 RX ORDER — SEMAGLUTIDE 2.4 MG/.75ML
2.4 INJECTION, SOLUTION SUBCUTANEOUS WEEKLY
Qty: 3 ML | Refills: 1 | Status: SHIPPED | OUTPATIENT
Start: 2024-10-10

## 2024-10-10 NOTE — TELEPHONE ENCOUNTER
Requesting   Requested Prescriptions     Pending Prescriptions Disp Refills    semaglutide-weight management (WEGOVY) 2.4 MG/0.75ML Subcutaneous Solution Auto-injector 3 mL 3     Sig: Inject 0.75 mL (2.4 mg total) into the skin once a week.       LOV: 06/24/2024  RTC: 12/13/2024  Filled: 06/24/2024 #3ML with 3 refills    Future Appointments   Date Time Provider Department Center   10/31/2024 10:30 AM Alexa Conrad APN [facility] [address]   12/13/2024  8:20 AM Sally Guerrero APRN [facility] [address]   1/28/2025 11:40 AM Sally Guerrero APRN [facility] [address]

## 2024-10-31 ENCOUNTER — APPOINTMENT (OUTPATIENT)
Dept: GENERAL RADIOLOGY | Age: 39
End: 2024-10-31
Attending: EMERGENCY MEDICINE
Payer: COMMERCIAL

## 2024-10-31 ENCOUNTER — HOSPITAL ENCOUNTER (EMERGENCY)
Age: 39
Discharge: HOME | End: 2024-10-31
Attending: EMERGENCY MEDICINE
Payer: COMMERCIAL

## 2024-10-31 ENCOUNTER — OFFICE VISIT (OUTPATIENT)
Dept: OBGYN CLINIC | Facility: CLINIC | Age: 39
End: 2024-10-31
Payer: COMMERCIAL

## 2024-10-31 VITALS
DIASTOLIC BLOOD PRESSURE: 91 MMHG | HEART RATE: 97 BPM | OXYGEN SATURATION: 99 % | RESPIRATION RATE: 19 BRPM | HEIGHT: 65 IN | SYSTOLIC BLOOD PRESSURE: 143 MMHG | TEMPERATURE: 100 F | BODY MASS INDEX: 32.65 KG/M2 | WEIGHT: 196 LBS

## 2024-10-31 VITALS
BODY MASS INDEX: 33.12 KG/M2 | SYSTOLIC BLOOD PRESSURE: 110 MMHG | HEART RATE: 80 BPM | HEIGHT: 65 IN | WEIGHT: 198.81 LBS | DIASTOLIC BLOOD PRESSURE: 72 MMHG

## 2024-10-31 DIAGNOSIS — Z12.4 CERVICAL CANCER SCREENING: ICD-10-CM

## 2024-10-31 DIAGNOSIS — R07.9 CHEST PAIN OF UNCERTAIN ETIOLOGY: ICD-10-CM

## 2024-10-31 DIAGNOSIS — Z01.419 WELL WOMAN EXAM WITH ROUTINE GYNECOLOGICAL EXAM: Primary | ICD-10-CM

## 2024-10-31 DIAGNOSIS — J02.0 STREP PHARYNGITIS: Primary | ICD-10-CM

## 2024-10-31 LAB
ALBUMIN SERPL-MCNC: 3.5 G/DL (ref 3.4–5)
ALBUMIN/GLOB SERPL: 0.9 {RATIO} (ref 1–2)
ALP LIVER SERPL-CCNC: 41 U/L
ALT SERPL-CCNC: 19 U/L
ANION GAP SERPL CALC-SCNC: 4 MMOL/L (ref 0–18)
AST SERPL-CCNC: 8 U/L (ref 15–37)
B-HCG UR QL: NEGATIVE
BASOPHILS # BLD AUTO: 0.04 X10(3) UL (ref 0–0.2)
BASOPHILS NFR BLD AUTO: 0.4 %
BILIRUB SERPL-MCNC: 0.3 MG/DL (ref 0.1–2)
BUN BLD-MCNC: 12 MG/DL (ref 9–23)
CALCIUM BLD-MCNC: 9 MG/DL (ref 8.5–10.1)
CHLORIDE SERPL-SCNC: 108 MMOL/L (ref 98–112)
CO2 SERPL-SCNC: 26 MMOL/L (ref 21–32)
CREAT BLD-MCNC: 1.21 MG/DL
D DIMER PPP FEU-MCNC: 0.39 UG/ML FEU (ref ?–0.5)
EGFRCR SERPLBLD CKD-EPI 2021: 59 ML/MIN/1.73M2 (ref 60–?)
EOSINOPHIL # BLD AUTO: 0.25 X10(3) UL (ref 0–0.7)
EOSINOPHIL NFR BLD AUTO: 2.2 %
ERYTHROCYTE [DISTWIDTH] IN BLOOD BY AUTOMATED COUNT: 13.7 %
GLOBULIN PLAS-MCNC: 3.7 G/DL (ref 2.8–4.4)
GLUCOSE BLD-MCNC: 91 MG/DL (ref 70–99)
HCT VFR BLD AUTO: 30.1 %
HGB BLD-MCNC: 9.5 G/DL
IMM GRANULOCYTES # BLD AUTO: 0.05 X10(3) UL (ref 0–1)
IMM GRANULOCYTES NFR BLD: 0.4 %
LYMPHOCYTES # BLD AUTO: 2.21 X10(3) UL (ref 1–4)
LYMPHOCYTES NFR BLD AUTO: 19.6 %
MCH RBC QN AUTO: 25.3 PG (ref 26–34)
MCHC RBC AUTO-ENTMCNC: 31.6 G/DL (ref 31–37)
MCV RBC AUTO: 80.1 FL
MONOCYTES # BLD AUTO: 1.08 X10(3) UL (ref 0.1–1)
MONOCYTES NFR BLD AUTO: 9.6 %
NEUTROPHILS # BLD AUTO: 7.67 X10 (3) UL (ref 1.5–7.7)
NEUTROPHILS # BLD AUTO: 7.67 X10(3) UL (ref 1.5–7.7)
NEUTROPHILS NFR BLD AUTO: 67.8 %
OSMOLALITY SERPL CALC.SUM OF ELEC: 285 MOSM/KG (ref 275–295)
PLATELET # BLD AUTO: 310 10(3)UL (ref 150–450)
POCT INFLUENZA A: NEGATIVE
POCT INFLUENZA B: NEGATIVE
POTASSIUM SERPL-SCNC: 3.5 MMOL/L (ref 3.5–5.1)
PROT SERPL-MCNC: 7.2 G/DL (ref 6.4–8.2)
RBC # BLD AUTO: 3.76 X10(6)UL
SARS-COV-2 RNA RESP QL NAA+PROBE: NOT DETECTED
SODIUM SERPL-SCNC: 138 MMOL/L (ref 136–145)
TROPONIN I SERPL HS-MCNC: 5 NG/L
WBC # BLD AUTO: 11.3 X10(3) UL (ref 4–11)

## 2024-10-31 PROCEDURE — 93010 ELECTROCARDIOGRAM REPORT: CPT

## 2024-10-31 PROCEDURE — 3074F SYST BP LT 130 MM HG: CPT | Performed by: NURSE PRACTITIONER

## 2024-10-31 PROCEDURE — 87624 HPV HI-RISK TYP POOLED RSLT: CPT | Performed by: NURSE PRACTITIONER

## 2024-10-31 PROCEDURE — 81025 URINE PREGNANCY TEST: CPT

## 2024-10-31 PROCEDURE — 88175 CYTOPATH C/V AUTO FLUID REDO: CPT | Performed by: NURSE PRACTITIONER

## 2024-10-31 PROCEDURE — 3078F DIAST BP <80 MM HG: CPT | Performed by: NURSE PRACTITIONER

## 2024-10-31 PROCEDURE — 85025 COMPLETE CBC W/AUTO DIFF WBC: CPT | Performed by: EMERGENCY MEDICINE

## 2024-10-31 PROCEDURE — 3008F BODY MASS INDEX DOCD: CPT | Performed by: NURSE PRACTITIONER

## 2024-10-31 PROCEDURE — 80053 COMPREHEN METABOLIC PANEL: CPT | Performed by: EMERGENCY MEDICINE

## 2024-10-31 PROCEDURE — 87430 STREP A AG IA: CPT | Performed by: EMERGENCY MEDICINE

## 2024-10-31 PROCEDURE — 96374 THER/PROPH/DIAG INJ IV PUSH: CPT

## 2024-10-31 PROCEDURE — 85379 FIBRIN DEGRADATION QUANT: CPT | Performed by: EMERGENCY MEDICINE

## 2024-10-31 PROCEDURE — 84484 ASSAY OF TROPONIN QUANT: CPT | Performed by: EMERGENCY MEDICINE

## 2024-10-31 PROCEDURE — 99459 PELVIC EXAMINATION: CPT | Performed by: NURSE PRACTITIONER

## 2024-10-31 PROCEDURE — 71045 X-RAY EXAM CHEST 1 VIEW: CPT | Performed by: EMERGENCY MEDICINE

## 2024-10-31 PROCEDURE — 99285 EMERGENCY DEPT VISIT HI MDM: CPT

## 2024-10-31 PROCEDURE — 99395 PREV VISIT EST AGE 18-39: CPT | Performed by: NURSE PRACTITIONER

## 2024-10-31 PROCEDURE — 93005 ELECTROCARDIOGRAM TRACING: CPT

## 2024-10-31 PROCEDURE — 87502 INFLUENZA DNA AMP PROBE: CPT | Performed by: EMERGENCY MEDICINE

## 2024-10-31 RX ORDER — KETOROLAC TROMETHAMINE 30 MG/ML
30 INJECTION, SOLUTION INTRAMUSCULAR; INTRAVENOUS ONCE
Status: COMPLETED | OUTPATIENT
Start: 2024-10-31 | End: 2024-10-31

## 2024-10-31 RX ORDER — AMOXICILLIN 500 MG/1
500 TABLET, FILM COATED ORAL 2 TIMES DAILY
Qty: 20 TABLET | Refills: 0 | Status: SHIPPED | OUTPATIENT
Start: 2024-10-31 | End: 2024-11-10

## 2024-10-31 RX ORDER — ACETAMINOPHEN 500 MG
1000 TABLET ORAL ONCE
Status: COMPLETED | OUTPATIENT
Start: 2024-10-31 | End: 2024-10-31

## 2024-10-31 RX ORDER — AMOXICILLIN 500 MG/1
500 CAPSULE ORAL ONCE
Status: COMPLETED | OUTPATIENT
Start: 2024-10-31 | End: 2024-10-31

## 2024-10-31 NOTE — PROGRESS NOTES
Here for Routine Annual Exam  No concerns or questions.  Menses are regular.  Contraception- Tubal Ligation.  She is interested in options to conceive, thinking about tubal reversal.    ROS: No Cardiac, Respiratory, GI,  or Neurological symptoms.    PE:  GENERAL: well developed, well nourished, in no apparent distress  SKIN: no rashes, no suspicious lesions  HEENT: normal  NECK: supple; no thyroidmegaly, no adenopathy  LUNGS: clear to auscultation  CARDIOVASCULAR: normal S1, S2, RRR  BREASTS: firm, nontendder, no palpable masses or nodes, no nipple discharge, no skin changes, no axillary adenopathy,    ABDOMEN: Soft, non distended; non tender, no masses  GYNE/: External Genitalia: Normal without lesions or erythema                      Vagina: normal without lesions, scant thin discharge                      Uterus: mid, mobile, non tender, normal size                     Cervix: no lesions or CMT                     Adnexa: non tender, no masses, normal size  EXTREMITIES:  non tender without edema    A/P:   1. Well woman exam with routine gynecological exam  Regular self breast exams recommended  Advised she could speak with COOKIE ro discuss pursuing IVF- I am not familiar with any providers who reverse tubal ligation but she could do some research    2. Cervical cancer screening  - ThinPrep PAP with HPV Reflex Request; Future     Return to clinic 1 year for routine exam, or as needed with any concerns or question

## 2024-11-01 LAB
ATRIAL RATE: 99 BPM
P AXIS: 23 DEGREES
P-R INTERVAL: 184 MS
Q-T INTERVAL: 344 MS
QRS DURATION: 98 MS
QTC CALCULATION (BEZET): 441 MS
R AXIS: 23 DEGREES
T AXIS: 20 DEGREES
VENTRICULAR RATE: 99 BPM

## 2024-11-01 NOTE — ED INITIAL ASSESSMENT (HPI)
40 y/o F arrives to ED with c/o chest tightness that is worse with inspiration, cough, sore throat, and body aches that started yesterday. Patient denies any known sick contact.

## 2024-11-01 NOTE — DISCHARGE INSTRUCTIONS
You were seen in the Emergency Room. You tested positive for strep.     - Take the full course of antibiotics  - Alternate between Tylenol and ibuprofen as needed for pain.  Stay hydrated with plenty of fluids.  - Continue to monitor your blood pressure at home.  If it is elevated then recommend taking your home blood pressure medications.  - Return to the ER if you develop difficulty breathing or worsening chest pain, dizziness, if you are unable to drink fluids, drooling or voice changes, neck stiffness or any other new concerns or worsening symptoms.  Please follow closely with your primary care physician for reevaluation.

## 2024-11-01 NOTE — ED PROVIDER NOTES
Patient Seen in: [facility] In [place]      History     Chief Complaint   Patient presents with    Cough/URI     Stated Complaint: chest tightness. sore throat. cough. all starting yesterday    Subjective:   HPI  Patient is a 39-year-old female with a history of hypertension who presents to the ED for evaluation of myalgias, sore throat, chest tightness, cough starting yesterday.  Patient reports that the pain in her chest is only present when taking a deep breath and sometimes she feels short of breath with it.  Denies any fevers but reports some chills.  No vomiting but some diarrhea.  She denies any cardiac history or family history of early cardiac disease.  Denies any history of blood clots, recent travel or surgeries, history of cancer, hemoptysis, leg swelling.  No known sick contacts.      Objective:     Past Medical History:    Allergic rhinitis    Hypertension in pregnancy, preeclampsia ([facility])    Morbid obesity with BMI of 40.0-44.9, adult ([facility])    Obesity    Obstructive apnea    DMG SPLIT AHI 47 SaO2  alonso 85% CPAP 6-16 HME    Obstructive sleep apnea    Sleep apnea    Unspecified essential hypertension    Visual impairment              Past Surgical History:   Procedure Laterality Date    Anesth, section             delivery only      Tubal ligation                  Social History     Socioeconomic History    Marital status: Single   Tobacco Use    Smoking status: Former     Current packs/day: 0.00     Types: Cigarettes     Start date: 9/10/2005     Quit date: 10/25/2017     Years since quittin.0     Passive exposure: Never    Smokeless tobacco: Never   Vaping Use    Vaping status: Every Day    Substances: Nicotine, THC    Devices: Disposable   Substance and Sexual Activity    Alcohol use: Yes     Alcohol/week: 3.0 standard drinks of alcohol     Types: 1 Glasses of wine, 2 Shots of liquor per week     Comment: occasionally    Drug use: Yes     Frequency:  1.0 times per week     Types: Cannabis     Comment: daily    Sexual activity: Yes     Partners: Male     Birth control/protection: Tubal Ligation   Other Topics Concern    Caffeine Concern No    Exercise No    Seat Belt No    Special Diet No    Stress Concern No    Weight Concern Yes     Social Drivers of Health      [facility]                  Physical Exam     ED Triage Vitals [10/31/24 2203]   BP (!) 169/116   Pulse 101   Resp 18   Temp (!) 100.7 °F (38.2 °C)   Temp src Oral   SpO2 98 %   O2 Device None (Room air)       Current Vitals:   Vital Signs  BP: (!) 143/91  Pulse: 97  Resp: 19  Temp: 99.9 °F (37.7 °C)  Temp src: Oral    Oxygen Therapy  SpO2: 99 %  O2 Device: None (Room air)        Physical Exam  Vitals and nursing note reviewed.   Constitutional:       General: She is not in acute distress.     Appearance: She is not ill-appearing.   HENT:      Head: Normocephalic and atraumatic.      Mouth/Throat:      Mouth: Mucous membranes are moist.   Eyes:      Extraocular Movements: Extraocular movements intact.      Pupils: Pupils are equal, round, and reactive to light.   Cardiovascular:      Rate and Rhythm: Normal rate and regular rhythm.   Pulmonary:      Effort: Pulmonary effort is normal.   Chest:      Comments: Mild reproducible central chest wall TTP  Abdominal:      General: There is no distension.      Palpations: Abdomen is soft.      Tenderness: There is no abdominal tenderness.   Musculoskeletal:      Cervical back: Neck supple.      Right lower leg: No edema.      Left lower leg: No edema.   Skin:     General: Skin is warm and dry.      Capillary Refill: Capillary refill takes less than 2 seconds.   Neurological:      General: No focal deficit present.      Mental Status: She is alert.   Psychiatric:         Mood and Affect: Mood normal.           ED Course     Labs Reviewed   CBC WITH DIFFERENTIAL WITH PLATELET - Abnormal; Notable for the following  components:       Result Value    WBC 11.3 (*)     RBC 3.76 (*)     HGB 9.5 (*)     HCT 30.1 (*)     MCH 25.3 (*)     Monocyte Absolute 1.08 (*)     All other components within normal limits   COMP METABOLIC PANEL (14) - Abnormal; Notable for the following components:    Creatinine 1.21 (*)     eGFR-Cr 59 (*)     AST 8 (*)     A/G Ratio 0.9 (*)     All other components within normal limits   RAPID STREP A SCREEN (LC) - Abnormal; Notable for the following components:    Rapid Strep A Result Positive for Beta Streptococcus, Group A (*)     All other components within normal limits   TROPONIN I HIGH SENSITIVITY - Normal   D-DIMER - Normal   POCT PREGNANCY URINE - Normal   RAPID SARS-COV-2 BY PCR - Normal   POCT FLU TEST - Normal    Narrative:     This assay is a rapid molecular in vitro test utilizing nucleic acid amplification of influenza A and B viral RNA.     EKG    Rate, intervals and axes as noted on EKG Report.  Rate: 99  Rhythm: Sinus Rhythm  Reading: NSR with ventricular rate of 99, no acute ST elevations or depressions, normal intervals           ED Course as of 11/01/24 0850  ------------------------------------------------------------  Time: 10/31 2313  Comment: Covid, flu negative.   ------------------------------------------------------------  Time: 10/31 2313  Comment: CBC shows mild leukocytosis of 11.3, hgb 9.5. CMP with mild OTTO. Trop negative. Upreg neg.   ------------------------------------------------------------  Time: 10/31 2313  Comment: CXR independently reviewed and shows mild bronchitis, no focal consolidation.   ------------------------------------------------------------  Time: 10/31 7566  Comment: Discussed results of workup with patient at bedside.  Patient is ready for discharge home.  Blood pressure improved. She is feeling better with toradol  Recommend close monitoring of blood pressure at home.  Patient has a home blood pressure medication which she is not taking currently.   Recommended continue monitoring of BP and symptoms.  Will Prescribe Amoxicillin for 10 Days for strep.  Patient is stable for discharge home with close PCP f/u. Discussed strict return precautions and supportive care. Patient verbalized understanding and agreement of plan.               MDM      Patient is a 39-year-old female with a history of hypertension who presents to the ED for evaluation of myalgias, sore throat, chest tightness, cough starting yesterday.  Patient is febrile 200.7, mildly tachycardic, hypertensive, satting well on room air.  On exam patient is nontoxic-appearing.  Lungs are clear bilaterally.  She has mildly reproducible chest wall tenderness.  No peripheral edema.  Differential diagnosis includes but not limited to viral infection, strep pharyngitis, pneumonia.  Suspect that patient's chest pain is likely pleurisy or costochondritis as patient has been coughing and this is slightly reproducible.  Low suspicion for ACS, PE, myocarditis, pericarditis.  However Given chest discomfort is pleuritic, will obtain D-dimer as pt is low risk for PE using Well's criteria.  Will obtain basic labs and give Toradol.  Will also obtain strep swab.    See ED course above        Medical Decision Making      Disposition and Plan     Clinical Impression:  1. Strep pharyngitis    2. Chest pain of uncertain etiology         Disposition:  Discharge  10/31/2024 11:38 pm    Follow-up:  Alvina Manley MD  [address] [place] [address]  [phone]    Schedule an appointment as soon as possible for a visit in 1 day(s)            Medications Prescribed:  Discharge Medication List as of 10/31/2024 11:46 PM        START taking these medications    Details   amoxicillin 500 MG Oral Tab Take 1 tablet (500 mg total) by mouth 2 (two) times daily for 10 days., Normal, Disp-20 tablet, R-0                 Supplementary Documentation:

## 2024-11-05 LAB
.: NORMAL
.: NORMAL

## 2024-11-06 LAB — HPV E6+E7 MRNA CVX QL NAA+PROBE: NEGATIVE

## 2024-11-11 ENCOUNTER — PATIENT MESSAGE (OUTPATIENT)
Dept: OBGYN CLINIC | Facility: CLINIC | Age: 39
End: 2024-11-11

## 2024-11-13 ENCOUNTER — HOSPITAL ENCOUNTER (EMERGENCY)
Age: 39
Discharge: HOME | End: 2024-11-13
Attending: EMERGENCY MEDICINE
Payer: COMMERCIAL

## 2024-11-13 ENCOUNTER — APPOINTMENT (OUTPATIENT)
Dept: GENERAL RADIOLOGY | Age: 39
End: 2024-11-13
Payer: COMMERCIAL

## 2024-11-13 ENCOUNTER — APPOINTMENT (OUTPATIENT)
Dept: GENERAL RADIOLOGY | Age: 39
End: 2024-11-13
Attending: EMERGENCY MEDICINE
Payer: COMMERCIAL

## 2024-11-13 VITALS
DIASTOLIC BLOOD PRESSURE: 105 MMHG | HEIGHT: 64 IN | TEMPERATURE: 99 F | RESPIRATION RATE: 16 BRPM | WEIGHT: 196 LBS | HEART RATE: 88 BPM | BODY MASS INDEX: 33.46 KG/M2 | OXYGEN SATURATION: 99 % | SYSTOLIC BLOOD PRESSURE: 158 MMHG

## 2024-11-13 DIAGNOSIS — J02.9 SORE THROAT: ICD-10-CM

## 2024-11-13 DIAGNOSIS — I10 UNCONTROLLED HYPERTENSION: ICD-10-CM

## 2024-11-13 DIAGNOSIS — J20.9 ACUTE BRONCHITIS, UNSPECIFIED ORGANISM: Primary | ICD-10-CM

## 2024-11-13 LAB
BASOPHILS # BLD AUTO: 0.06 X10(3) UL (ref 0–0.2)
BASOPHILS NFR BLD AUTO: 0.6 %
EOSINOPHIL # BLD AUTO: 0.18 X10(3) UL (ref 0–0.7)
EOSINOPHIL NFR BLD AUTO: 1.7 %
ERYTHROCYTE [DISTWIDTH] IN BLOOD BY AUTOMATED COUNT: 14.7 %
HCT VFR BLD AUTO: 31.5 %
HETEROPH AB SER QL: NEGATIVE
HGB BLD-MCNC: 9.8 G/DL
IMM GRANULOCYTES # BLD AUTO: 0.03 X10(3) UL (ref 0–1)
IMM GRANULOCYTES NFR BLD: 0.3 %
LYMPHOCYTES # BLD AUTO: 2.8 X10(3) UL (ref 1–4)
LYMPHOCYTES NFR BLD AUTO: 26.5 %
MCH RBC QN AUTO: 24.4 PG (ref 26–34)
MCHC RBC AUTO-ENTMCNC: 31.1 G/DL (ref 31–37)
MCV RBC AUTO: 78.6 FL
MONOCYTES # BLD AUTO: 0.79 X10(3) UL (ref 0.1–1)
MONOCYTES NFR BLD AUTO: 7.5 %
NEUTROPHILS # BLD AUTO: 6.71 X10 (3) UL (ref 1.5–7.7)
NEUTROPHILS # BLD AUTO: 6.71 X10(3) UL (ref 1.5–7.7)
NEUTROPHILS NFR BLD AUTO: 63.4 %
PLATELET # BLD AUTO: 311 10(3)UL (ref 150–450)
RBC # BLD AUTO: 4.01 X10(6)UL
WBC # BLD AUTO: 10.6 X10(3) UL (ref 4–11)

## 2024-11-13 PROCEDURE — 86403 PARTICLE AGGLUT ANTBDY SCRN: CPT | Performed by: EMERGENCY MEDICINE

## 2024-11-13 PROCEDURE — 86664 EPSTEIN-BARR NUCLEAR ANTIGEN: CPT | Performed by: EMERGENCY MEDICINE

## 2024-11-13 PROCEDURE — 71046 X-RAY EXAM CHEST 2 VIEWS: CPT

## 2024-11-13 PROCEDURE — 99285 EMERGENCY DEPT VISIT HI MDM: CPT

## 2024-11-13 PROCEDURE — 94640 AIRWAY INHALATION TREATMENT: CPT

## 2024-11-13 PROCEDURE — 36415 COLL VENOUS BLD VENIPUNCTURE: CPT

## 2024-11-13 PROCEDURE — 85025 COMPLETE CBC W/AUTO DIFF WBC: CPT | Performed by: EMERGENCY MEDICINE

## 2024-11-13 PROCEDURE — 70360 X-RAY EXAM OF NECK: CPT | Performed by: EMERGENCY MEDICINE

## 2024-11-13 PROCEDURE — 86665 EPSTEIN-BARR CAPSID VCA: CPT | Performed by: EMERGENCY MEDICINE

## 2024-11-13 PROCEDURE — 99284 EMERGENCY DEPT VISIT MOD MDM: CPT

## 2024-11-13 PROCEDURE — 87430 STREP A AG IA: CPT | Performed by: EMERGENCY MEDICINE

## 2024-11-13 RX ORDER — PREDNISONE 20 MG/1
40 TABLET ORAL DAILY
Qty: 10 TABLET | Refills: 0 | Status: SHIPPED | OUTPATIENT
Start: 2024-11-13 | End: 2024-11-18

## 2024-11-13 RX ORDER — FLUCONAZOLE 150 MG/1
TABLET ORAL
Qty: 2 TABLET | Refills: 0 | Status: SHIPPED | OUTPATIENT
Start: 2024-11-13

## 2024-11-13 RX ORDER — ALBUTEROL SULFATE 90 UG/1
2 INHALANT RESPIRATORY (INHALATION) ONCE
Status: COMPLETED | OUTPATIENT
Start: 2024-11-13 | End: 2024-11-13

## 2024-11-13 NOTE — TELEPHONE ENCOUNTER
Pt completed antibiotics for strep throat & now has a yeast infection, she is requesting Diflucan Rx. Please advise on any orders for pt.  LOV 10/31/24

## 2024-11-14 LAB
EBV NA IGG SER QL IA: POSITIVE
EBV VCA IGG SER QL IA: POSITIVE
EBV VCA IGM SER QL IA: NEGATIVE

## 2024-11-14 NOTE — ED INITIAL ASSESSMENT (HPI)
Pt presents with persistent cough and sore throat. Pt was treated with amoxicillin for strep/bronchitis on 10/31. Scratchy throat came back yesterday.

## 2024-11-14 NOTE — ED PROVIDER NOTES
Patient Seen in: [facility] In [place]      History     Chief Complaint   Patient presents with    Sore Throat    Cough     Stated Complaint: sore throat cough and congestion treated on 10/31 for strep and bronchitisn    Subjective:   HPI      Patient presents with sore throat and cough.  The patient states she first was sick on .  She had a sore throat and cough and was seen here.  She was started on amoxicillin for strep which she completed a couple of days ago.  The throat symptoms got better but the cough never went away.  She states that yesterday her throat started to feel little scratchy again and today was more of a slight burning.  She is not having difficulty swallowing.  She is not short of breath.  She denies any fever.  She has slight rib discomfort with coughing only.    Objective:     Past Medical History:    Allergic rhinitis    Hypertension in pregnancy, preeclampsia ([facility])    Morbid obesity with BMI of 40.0-44.9, adult ([facility])    Obesity    Obstructive apnea    DMG SPLIT AHI 47 SaO2  alonso 85% CPAP 6-16 HME    Obstructive sleep apnea    Sleep apnea    Unspecified essential hypertension    Visual impairment              Past Surgical History:   Procedure Laterality Date    Anesth, section             delivery only      Tubal ligation                  Social History     Socioeconomic History    Marital status: Single   Tobacco Use    Smoking status: Former     Current packs/day: 0.00     Types: Cigarettes     Start date: 9/10/2005     Quit date: 10/25/2017     Years since quittin.0     Passive exposure: Never    Smokeless tobacco: Never   Vaping Use    Vaping status: Every Day    Substances: Nicotine, THC    Devices: Disposable   Substance and Sexual Activity    Alcohol use: Yes     Alcohol/week: 3.0 standard drinks of alcohol     Types: 1 Glasses of wine, 2 Shots of liquor per week     Comment: occasionally    Drug use: Yes     Frequency: 1.0  times per week     Types: Cannabis     Comment: daily    Sexual activity: Yes     Partners: Male     Birth control/protection: Tubal Ligation   Other Topics Concern    Caffeine Concern No    Exercise No    Seat Belt No    Special Diet No    Stress Concern No    Weight Concern Yes     Social Drivers of Health      Received from [facility]                  Physical Exam     ED Triage Vitals [11/13/24 1823]   BP (!) 183/99   Pulse 75   Resp 16   Temp 99 °F (37.2 °C)   Temp src Oral   SpO2 100 %   O2 Device None (Room air)       Current Vitals:   Vital Signs  BP: (!) 170/109  Pulse: 74  Resp: 19  Temp: 99 °F (37.2 °C)  Temp src: Oral    Oxygen Therapy  SpO2: 100 %  O2 Device: None (Room air)        Physical Exam  General: Alert and oriented x3 in no acute distress.  HEENT: Normocephalic, atraumatic, pupils equal round and reactive to light, oropharynx clear, uvula midline.  Neck: Supple, no lymphadenopathy.  Cardiovascular: Regular rate and rhythm.  Respiratory: Lungs clear to auscultation.  Extremities: No CCE.  Skin: Warm and dry.    ED Course     Labs Reviewed   CBC WITH DIFFERENTIAL WITH PLATELET - Abnormal; Notable for the following components:       Result Value    HGB 9.8 (*)     HCT 31.5 (*)     MCV 78.6 (*)     MCH 24.4 (*)     All other components within normal limits   MONO QUAL, RFX TO EBV-VCA ON NEG - Normal   RAPID STREP A SCREEN (LC) - Normal    Narrative:     A confirmatory culture is recommended if clinically indicated.   EBV, CHRONIC/ACTIVE INFECTION,IGG/IGM     I personally reviewed the soft tissue neck and chest films and no retropharyngeal abscess or pneumonia noted.     XR SOFT TISSUE NECK (CPT=70360)    Result Date: 11/13/2024  PROCEDURE:  XR SOFT TISSUE NECK (CPT=70360)  COMPARISON:  None.  INDICATIONS:  sore throat cough and congestion treated on 10/31 for strep and bronchitisn  PATIENT STATED HISTORY: (As transcribed by Technologist)  Sore throat from 10/31 to  11/8, then resumed yesterday morning.    FINDINGS:  ADENOIDS:  Normal for age. OTHER:  The epiglottis is within normal limits.  No significant prevertebral soft tissue swelling.            CONCLUSION:  No acute radiographic findings.  If there is continued clinical concern, CT evaluation is recommended.   LOCATION:  [facility]    Dictated by (CST): Javier Smith MD on 11/13/2024 at 8:02 PM     Finalized by (CST): Javier Smith MD on 11/13/2024 at 8:02 PM       XR CHEST PA + LAT CHEST (CPT=71046)    Result Date: 11/13/2024  PROCEDURE:  XR CHEST PA + LAT CHEST (CPT=71046)  INDICATIONS:  sore throat cough and congestion treated on 10/31 for strep and bronchitis  COMPARISON:  [facility], XR, XR CHEST AP PORTABLE  (CPT=71045), 10/31/2024, 10:18 PM.  [facility], XR, XR RIBS WITH CHEST (3 VIEWS), LEFT  (CPT=71101), 7/14/2024, 12:05 PM.  TECHNIQUE:  PA and lateral chest radiographs were obtained.  PATIENT STATED HISTORY: (As transcribed by Technologist)  Slightly productive cough and short of breath for past 2 weeks.    FINDINGS:  LUNGS:  No focal consolidation.  Normal vascularity. CARDIAC:  Normal size cardiac silhouette. MEDIASTINUM:  Normal. PLEURA:  Normal.  No pleural effusions. BONES:  Normal for age.            CONCLUSION:  No acute radiographic findings.   LOCATION:  [facility]   Dictated by ([facility]): Javier Smith MD on 11/13/2024 at 7:19 PM     Finalized by (CST): Javier Smith MD on 11/13/2024 at 7:19 PM       XR CHEST AP PORTABLE  (CPT=71045)    Result Date: 10/31/2024  PROCEDURE:  XR CHEST AP PORTABLE  (CPT=71045)  TECHNIQUE:  AP chest radiograph was obtained.  COMPARISON:  [facility], XR, XR RIBS WITH CHEST (3 VIEWS), LEFT  (CPT=71101), 7/14/2024, 12:05 PM.  INDICATIONS:  chest tightness. sore throat. cough. all starting yesterday  PATIENT STATED HISTORY: (As transcribed by Technologist)  Pt c/o mid chest pain,sore throat and cough that began yesterday.    FINDINGS:  The heart is normal in size.  There is mild  peribronchial cuffing and period hilar streaky opacity can be seen with bronchitis..  There are no pleural effusions.  The bones are unremarkable.            CONCLUSION:  Findings of mild bronchitis   LOCATION:  [facility]      Dictated by (CST): Mart Araiza MD on 10/31/2024 at 10:40 PM     Finalized by (CST): Mart Araiza MD on 10/31/2024 at 10:41 PM        Patient was given 2 puffs of an albuterol inhaler.     MDM      Patient presents with sore throat and cough.  Differential diagnosis includes but is not limited to mono, recurrent strep, retropharyngeal abscess and pneumonia.  The patient overall appears well.  Her strep test has come back negative as well as the mono.  Her CBC shows no leukocytosis but a stable anemia.  Chest x-ray and neck x-rays do not show evidence of abscess or pneumonia.  I think the patient likely has a viral etiology to her symptoms.  She we will continue the albuterol as needed for the cough and I will prescribe prednisone for the sore throat.  She does have an elevated blood pressure which she attributes to some stress that she is going through.  She should follow-up with her primary for blood pressure recheck.  She will return for new or worsening symptoms.        [facility]    Disposition and Plan     Clinical Impression:  1. Acute bronchitis, unspecified organism    2. Sore throat    3. Uncontrolled hypertension         Disposition:  Discharge  11/13/2024  8:14 pm    Follow-up:  Alvina Manley MD  [address]  [facility] [place]  [facility] [address]  [phone]    Call in 1 day(s)            Medications Prescribed:  Current Discharge Medication List        START taking these medications    Details   predniSONE 20 MG Oral Tab Take 2 tablets (40 mg total) by mouth daily for 5 days.  Qty: 10 tablet, Refills: 0                 Supplementary Documentation:

## 2024-12-07 ENCOUNTER — APPOINTMENT (OUTPATIENT)
Dept: CT IMAGING | Age: 39
End: 2024-12-07
Attending: EMERGENCY MEDICINE
Payer: COMMERCIAL

## 2024-12-07 ENCOUNTER — HOSPITAL ENCOUNTER (EMERGENCY)
Age: 39
Discharge: HOME | End: 2024-12-07
Attending: EMERGENCY MEDICINE
Payer: COMMERCIAL

## 2024-12-07 VITALS
RESPIRATION RATE: 16 BRPM | HEIGHT: 64 IN | TEMPERATURE: 98 F | DIASTOLIC BLOOD PRESSURE: 100 MMHG | SYSTOLIC BLOOD PRESSURE: 169 MMHG | WEIGHT: 196 LBS | HEART RATE: 78 BPM | OXYGEN SATURATION: 100 % | BODY MASS INDEX: 33.46 KG/M2

## 2024-12-07 DIAGNOSIS — K59.00 CONSTIPATION, UNSPECIFIED CONSTIPATION TYPE: Primary | ICD-10-CM

## 2024-12-07 LAB
ALBUMIN SERPL-MCNC: 4.1 G/DL (ref 3.2–4.8)
ALBUMIN/GLOB SERPL: 1.5 {RATIO} (ref 1–2)
ALP LIVER SERPL-CCNC: 37 U/L
ALT SERPL-CCNC: 10 U/L
ANION GAP SERPL CALC-SCNC: 3 MMOL/L (ref 0–18)
AST SERPL-CCNC: 13 U/L (ref ?–34)
B-HCG UR QL: NEGATIVE
BASOPHILS # BLD AUTO: 0.04 X10(3) UL (ref 0–0.2)
BASOPHILS NFR BLD AUTO: 0.6 %
BILIRUB SERPL-MCNC: 0.5 MG/DL (ref 0.3–1.2)
BILIRUB UR QL STRIP.AUTO: NEGATIVE
BUN BLD-MCNC: 10 MG/DL (ref 9–23)
CALCIUM BLD-MCNC: 9.1 MG/DL (ref 8.7–10.4)
CHLORIDE SERPL-SCNC: 110 MMOL/L (ref 98–112)
CLARITY UR REFRACT.AUTO: CLEAR
CO2 SERPL-SCNC: 27 MMOL/L (ref 21–32)
COLOR UR AUTO: YELLOW
CREAT BLD-MCNC: 1.08 MG/DL
EGFRCR SERPLBLD CKD-EPI 2021: 68 ML/MIN/1.73M2 (ref 60–?)
EOSINOPHIL # BLD AUTO: 0.19 X10(3) UL (ref 0–0.7)
EOSINOPHIL NFR BLD AUTO: 3.1 %
ERYTHROCYTE [DISTWIDTH] IN BLOOD BY AUTOMATED COUNT: 16.3 %
GLOBULIN PLAS-MCNC: 2.7 G/DL (ref 2–3.5)
GLUCOSE BLD-MCNC: 78 MG/DL (ref 70–99)
GLUCOSE UR STRIP.AUTO-MCNC: NEGATIVE MG/DL
HCT VFR BLD AUTO: 28.8 %
HGB BLD-MCNC: 8.9 G/DL
IMM GRANULOCYTES # BLD AUTO: 0.01 X10(3) UL (ref 0–1)
IMM GRANULOCYTES NFR BLD: 0.2 %
KETONES UR STRIP.AUTO-MCNC: NEGATIVE MG/DL
LEUKOCYTE ESTERASE UR QL STRIP.AUTO: NEGATIVE
LYMPHOCYTES # BLD AUTO: 2.37 X10(3) UL (ref 1–4)
LYMPHOCYTES NFR BLD AUTO: 38.1 %
MCH RBC QN AUTO: 23.2 PG (ref 26–34)
MCHC RBC AUTO-ENTMCNC: 30.9 G/DL (ref 31–37)
MCV RBC AUTO: 75 FL
MONOCYTES # BLD AUTO: 0.6 X10(3) UL (ref 0.1–1)
MONOCYTES NFR BLD AUTO: 9.6 %
NEUTROPHILS # BLD AUTO: 3.01 X10 (3) UL (ref 1.5–7.7)
NEUTROPHILS # BLD AUTO: 3.01 X10(3) UL (ref 1.5–7.7)
NEUTROPHILS NFR BLD AUTO: 48.4 %
NITRITE UR QL STRIP.AUTO: NEGATIVE
OSMOLALITY SERPL CALC.SUM OF ELEC: 288 MOSM/KG (ref 275–295)
PH UR STRIP.AUTO: 8.5 [PH] (ref 5–8)
PLATELET # BLD AUTO: 316 10(3)UL (ref 150–450)
POTASSIUM SERPL-SCNC: 3.9 MMOL/L (ref 3.5–5.1)
PROT SERPL-MCNC: 6.8 G/DL (ref 5.7–8.2)
PROT UR STRIP.AUTO-MCNC: NEGATIVE MG/DL
RBC # BLD AUTO: 3.84 X10(6)UL
RBC UR QL AUTO: NEGATIVE
SODIUM SERPL-SCNC: 140 MMOL/L (ref 136–145)
SP GR UR STRIP.AUTO: 1.02 (ref 1–1.03)
UROBILINOGEN UR STRIP.AUTO-MCNC: 0.2 MG/DL
WBC # BLD AUTO: 6.2 X10(3) UL (ref 4–11)

## 2024-12-07 PROCEDURE — 99284 EMERGENCY DEPT VISIT MOD MDM: CPT

## 2024-12-07 PROCEDURE — 81003 URINALYSIS AUTO W/O SCOPE: CPT | Performed by: EMERGENCY MEDICINE

## 2024-12-07 PROCEDURE — 96374 THER/PROPH/DIAG INJ IV PUSH: CPT

## 2024-12-07 PROCEDURE — 85025 COMPLETE CBC W/AUTO DIFF WBC: CPT | Performed by: EMERGENCY MEDICINE

## 2024-12-07 PROCEDURE — 74177 CT ABD & PELVIS W/CONTRAST: CPT | Performed by: EMERGENCY MEDICINE

## 2024-12-07 PROCEDURE — 80053 COMPREHEN METABOLIC PANEL: CPT | Performed by: EMERGENCY MEDICINE

## 2024-12-07 PROCEDURE — 81025 URINE PREGNANCY TEST: CPT

## 2024-12-07 RX ORDER — DOCUSATE SODIUM 100 MG/1
100 CAPSULE, LIQUID FILLED ORAL 2 TIMES DAILY
Qty: 60 CAPSULE | Refills: 0 | Status: SHIPPED | OUTPATIENT
Start: 2024-12-07 | End: 2025-01-06

## 2024-12-07 RX ORDER — KETOROLAC TROMETHAMINE 15 MG/ML
15 INJECTION, SOLUTION INTRAMUSCULAR; INTRAVENOUS ONCE
Status: COMPLETED | OUTPATIENT
Start: 2024-12-07 | End: 2024-12-07

## 2024-12-07 RX ORDER — POLYETHYLENE GLYCOL 3350 17 G/17G
17 POWDER, FOR SOLUTION ORAL DAILY PRN
Qty: 12 EACH | Refills: 0 | Status: SHIPPED | OUTPATIENT
Start: 2024-12-07 | End: 2025-01-06

## 2024-12-07 NOTE — ED PROVIDER NOTES
Patient Seen in: [facility] In [place]      History     Chief Complaint   Patient presents with    Urinary Symptoms    Abdomen/Flank Pain     Stated Complaint: Urinary symptoms    Subjective:   HPI      39-year-old female presents for evaluation of lower abdominal pain for the past 24 hours with slight dysuria.  No fever or shaking chills.  No nausea or vomiting.  No constipation or diarrhea.  No history of same.    Objective:     Past Medical History:    Allergic rhinitis    Hypertension in pregnancy, preeclampsia ([facility])    Morbid obesity with BMI of 40.0-44.9, adult ([facility])    Obesity    Obstructive apnea    DMG SPLIT AHI 47 SaO2  alonso 85% CPAP 6-16 HME    Obstructive sleep apnea    Sleep apnea    Unspecified essential hypertension    Visual impairment              Past Surgical History:   Procedure Laterality Date    Anesth, section             delivery only      Tubal ligation                  Social History     Socioeconomic History    Marital status: Single   Tobacco Use    Smoking status: Former     Current packs/day: 0.00     Types: Cigarettes     Start date: 9/10/2005     Quit date: 10/25/2017     Years since quittin.1     Passive exposure: Never    Smokeless tobacco: Never   Vaping Use    Vaping status: Every Day    Substances: Nicotine, THC    Devices: Disposable   Substance and Sexual Activity    Alcohol use: Yes     Alcohol/week: 3.0 standard drinks of alcohol     Types: 1 Glasses of wine, 2 Shots of liquor per week     Comment: occasionally    Drug use: Yes     Frequency: 1.0 times per week     Types: Cannabis     Comment: daily    Sexual activity: Yes     Partners: Male     Birth control/protection: Tubal Ligation   Other Topics Concern    Caffeine Concern No    Exercise No    Seat Belt No    Special Diet No    Stress Concern No    Weight Concern Yes     Social Drivers of Health      [facility]                   Physical Exam     ED Triage Vitals [12/07/24 1057]   BP (!) 169/100   Pulse 78   Resp 16   Temp 97.5 °F (36.4 °C)   Temp src Oral   SpO2 100 %   O2 Device None (Room air)       Current Vitals:   Vital Signs  BP: (!) 169/100 (pt stopped BP medication)  Pulse: 78  Resp: 16  Temp: 97.5 °F (36.4 °C)  Temp src: Oral    Oxygen Therapy  SpO2: 100 %  O2 Device: None (Room air)        Physical Exam     General: Alert, oriented, no apparent distress  HEENT:  Pupils equal reactive.  Extraocular motions intact. MMM.  Neck: Supple  Lungs: Clear to auscultation bilaterally.  Heart: Regular rate and rhythm.  Abdomen: Soft, right lower quadrant and suprapubic tenderness to palpation  Skin: No rash.  No edema.  Neurologic: No focal neurologic deficits.  Normal speech pattern  Musculoskeletal: No tenderness or deformity noted.  Full range of motion throughout.      ED Course     Labs Reviewed   URINALYSIS WITH CULTURE REFLEX - Abnormal; Notable for the following components:       Result Value    pH Urine 8.5 (*)     All other components within normal limits   COMP METABOLIC PANEL (14) - Abnormal; Notable for the following components:    Creatinine 1.08 (*)     All other components within normal limits   CBC WITH DIFFERENTIAL WITH PLATELET - Abnormal; Notable for the following components:    HGB 8.9 (*)     HCT 28.8 (*)     MCV 75.0 (*)     MCH 23.2 (*)     MCHC 30.9 (*)     All other components within normal limits   POCT PREGNANCY URINE - Normal                   MDM      Differential diagnosis includes diverticulitis, appendicitis, UTI    UA negative.  Chemistry unremarkable.  CBC no leukocytosis.  Hemoglobin is at baseline    CT ABDOMEN+PELVIS(CONTRAST ONLY)(CPT=74177)    Result Date: 12/7/2024  PROCEDURE:  CT ABDOMEN+PELVIS (CONTRAST ONLY) (CPT=74177)  COMPARISON:  None.  INDICATIONS:  Urinary symptoms  TECHNIQUE:  CT scanning was performed from the dome of the diaphragm to the pubic symphysis with non-ionic intravenous  contrast material. Post contrast coronal MPR imaging was performed.  Dose reduction techniques were used. Dose information is transmitted to the ACR (American College of Radiology) NRDR (National Radiology Data Registry) which includes the Dose Index Registry.  PATIENT STATED HISTORY:(As transcribed by Technologist)    Urinary symptoms   CONTRAST USED:  100cc of Isovue 370  FINDINGS:  LUNG BASE:  Linear atelectasis left lung base. LIVER:  Homogeneous enhancement. BILIARY:  No biliary ductal dilatation. PANCREAS:  Homogeneous enhancement. SPLEEN:  Normal caliber. KIDNEYS:  No hydronephrosis or focal renal mass. ADRENALS:  Normal. AORTA/VASCULAR:  No aneurysm. RETROPERITONEUM:  No enlarged adenopathy. BOWEL/MESENTERY:  Normal caliber appendix.  Large amount of stool in the transverse and descending colon.  No dilated small bowel loops.  ABDOMINAL WALL:  No ventral wall hernia. PELVIC ORGANS:  Bilateral pelvic varices.  Mild wall thickening of the urinary bladder.  This is nonspecific.  This may be seen with cystitis versus incomplete bladder distension.  There is a 2.7 cm cyst arising from the left ovary with trace free fluid in the cul de sac.. BONES:  Mild degenerative changes in the lower lumbar facets.             CONCLUSION:  Normal caliber appendix.  Mild wall thickening of the urinary bladder which may be seen with cystitis versus bladder under distension.  Bilateral pelvic varices.  2.7 cm left ovarian cyst with trace free fluid in the cul de sac.  LOCATION:  [facility]   Dictated by (CST): Eveline Bishop MD on 12/07/2024 at 12:19 PM     Finalized by (CST): Eveline Bishop MD on 12/07/2024 at 12:22 PM        Medications   ketorolac (Toradol) 15 MG/ML injection 15 mg (15 mg Intravenous Given 12/7/24 1119)   iopamidol 76% (ISOVUE-370) injection for power injector (100 mL Intravenous Given 12/7/24 1222)     Recommend treatment for constipation with MiraLAX and Colace.  Also could try magnesium citrate for something  stronger.  Follow-up with PCP to recheck if symptoms persist in about 2 or 3 days    Medical Decision Making      Disposition and Plan     Clinical Impression:  1. Constipation, unspecified constipation type         Disposition:  Discharge  12/7/2024 12:33 pm    Follow-up:  Alvina Manley MD  [address] [place] [address]  [phone]    Call in 2 day(s)  As needed          Medications Prescribed:  Current Discharge Medication List        START taking these medications    Details   polyethylene glycol, PEG 3350, 17 g Oral Powd Pack Take 17 g by mouth daily as needed.  Qty: 12 each, Refills: 0      docusate sodium 100 MG Oral Cap Take 1 capsule (100 mg total) by mouth 2 (two) times daily.  Qty: 60 capsule, Refills: 0                 Supplementary Documentation:

## 2024-12-07 NOTE — ED INITIAL ASSESSMENT (HPI)
Pt to ed with c/o lower abdominal pain and pain to LLQ/pelvic area during end stream and post void.

## 2024-12-13 ENCOUNTER — TELEMEDICINE (OUTPATIENT)
Dept: INTERNAL MEDICINE CLINIC | Facility: CLINIC | Age: 39
End: 2024-12-13
Payer: COMMERCIAL

## 2024-12-13 DIAGNOSIS — I10 BENIGN ESSENTIAL HYPERTENSION: ICD-10-CM

## 2024-12-13 DIAGNOSIS — E66.9 OBESITY (BMI 30-39.9): ICD-10-CM

## 2024-12-13 DIAGNOSIS — F32.A ANXIETY AND DEPRESSION: ICD-10-CM

## 2024-12-13 DIAGNOSIS — G47.33 OSA ON CPAP: ICD-10-CM

## 2024-12-13 DIAGNOSIS — F41.9 ANXIETY AND DEPRESSION: ICD-10-CM

## 2024-12-13 DIAGNOSIS — Z51.81 THERAPEUTIC DRUG MONITORING: Primary | ICD-10-CM

## 2024-12-13 DIAGNOSIS — R06.83 SNORING: ICD-10-CM

## 2024-12-13 PROCEDURE — 99213 OFFICE O/P EST LOW 20 MIN: CPT | Performed by: NURSE PRACTITIONER

## 2024-12-13 NOTE — PATIENT INSTRUCTIONS
Next steps:  1.  Fill your prescribed medication and take as discussed and prescribed: wegovy 2.4mg weekly   2.  Schedule a personal nutrition consultation with one of our registered dieticians     Please try to work on the following dietary changes:    1.  Drink water with meals and throughout the day, cut down on soda and/or juice if consumed. Consider flavored water options like Bubbly, Spindrift, Hint and Ines.  2.  Eat breakfast daily and focus on having protein with each meal, examples include: greek yogurt, cottage cheese, hard boiled egg, whole grain toast with peanut butter.   3.  Reduce refined carbohydrates and sugars which includes items such as sweets, as well as rice, pasta, and bread and make sure to choose whole grain options when having them with just 1 serving per meal about the size of your inner palm.  4.  Consume non starchy veggies daily working towards making them a good 50% of your daily food intake. Add them to lunch and dinner consistently.  5.  Start a daily probiotic: VSL#3 is recommended, (order on line at www.vsl3.com). Take 1 capsule daily with water for 30 days, then reduce to 1 every other day (this will reduce the cost). Capsules can be left out for 2 weeks, but then must be refrigerated.      Please download courtney My Fitness Pal, LoseIt! Or Net Diary to monitor daily dietary intake and you will be able to see if you are eating the right amount of calories or too much or too little which would hinder weight loss. Additionally this will help to see your daily carbohydrate and protein intake. When you set the courtney up choose 1-2 lbs/week as a goal.  Keeping a paper food journal is an option as well to remain accountable for your choices- this is the start to mindful eating! A low calorie diet has been consistently shown to support weight loss.     Continue or start exercising to help establish a routine. If not already exercising begin with 1 day and progress as able with long-term  goal of 30 minutes 5 days a week at a minimum.     Meditation daily can help manage and control stress. Chronic stress can make weight loss difficult.  Exercising is one way to help with stress, but meditation using the CALM Peggy or another comparable alternative can be done in your home or place of work with little time commitment. This Peggy can also help work on behavior change and improve sleep. Check out the segment under Calm Masterclass and listen to The 4 Pillars of Health. A great way to begin learning about the foundation of lifestyle with practical tips to use in your every day.     Check out www.yourweightmatters.org blog for continued daily support and education along this weight loss journey!    Patient Resources:     Personal Training/Fitness Classes/Health Coaching     [facility] @ [email or web address]/healthy-driven/fitness-center Full fitness center with group fitness and personal training. Discount available as client of [facility].  Health Coaching and Personal Training with Rosalee Vivar at our [facility]- individual weekly coaching with option to add personal training and small group fitness classes targeted at weight loss- [phone] and/or email @ [email or web address]  360FIT [place] [email or web address]. Group Fitness [phone] and/or email Kim [email or web address]  [facility] Fitness Centers with multiple locations: AxisMobile (www.FloQast), Eat The CodinGame Fitness (www.RPM Sustainable Technologies.Tricycle), Fit Body Bootcamp (www.DimensionU (formerly Tabula Digita)bodybootAlchemia Oncologyp.Tricycle), WordStream Fitness (www.Nichewith), The Exercise  (www.exercisecoach.Tricycle)     Online Fitness  Fitness  on Utube  Fit in 10 DVD series- www.wpnbs06XXB.com  Sit and Be Fit - Chair exercise series Www.sitandbefit.org  Hip Hop Fit with Chicho Mc at www.hiphopfit.net     Apps for on the Go Fitness  Libby 7 Minute Workout  (orange box with white 7) - free on the go HIIT training peggy  Peloton Peggy @ www.onepeloton.com     Nutrition Trackers and Tools  LoseIT! And My Fitness Pal apps and on line for tracking nutrition  NOOM - virtual health coaching  FitFoundation (healthy meals on the go) in [place] @ www.gltxleodexsya6v.D.A.M. Good Media Limited  Bistro MD @ www.bistromd.com and Hjmfwm69 (keto and low carb plans recommended) @ www.jqclnm41.com, Metabolic Meals @ www.Oculo TherapyMetabolicMeals.com - individual prepared meals to go  Gobble, Blue Apron, Home , Every Plate, Sunbasket- on line meal delivery programs for preparation at home  Meal Village in [place] for homemade meals to go @ www.mealvillage.D.A.M. Good Media Limited  Diet Doctor @ www.dietdoctor.com - low carb swaps  Yummly - meal prep and planning peggy (www.yummly.com)     Stress Management/Behavior/Mindful Eating  CALM meditation peggy (www.calm.com)  Headspace  Am I Hungry? Mindful eating virtual  peggy  Www.yourweightmatters.org - Obesity Action Coalition sponsored Blog posts daily  Motivation peggy (black box with white \")- daily supportive messages sent to your phone     Books/Video Education/Podcasts  Mindless Eating by Fadi Morrison  Why We Get Sick by Denny Mason (a book about insulin resistance)  Atomic Habits by Mir Wheeler (a book about taking small steps to promote greater behavior change)   Can't Hurt Me by Quang Taylor (a book exploring the power of discipline in achieving your goals)  The End of Dieting: How to Live for Life by Dr. Tomas Campbell M.D. or listen to The [place] Podcast Episode 63: Understanding \"Nutritarian\" Eating w/Dr. Tomas Campbell  Your Body in Balance: The New Science of Food, Hormones, and Health by Dr. Filipe Galvan  The Menopause Diet Plan by Aubree Holman and Alexa Chowdhury  The Complete Guide to fasting by Dr. Walker  Sugar, Salt & Fat by Chaparrita Covarrubias, Ph.D, R.D.  Weight Loss Surgery Will Not Treat Food Addiction by Ariella Mendoza Ph.D  The Game Changers- NetIgnitionOne  Documentary on plant based nutrition  Fed Up - documentary about obesity (Free on Utube)  The Truth About Sugar - documentary on sugar (Free on Utube, https://youtu.be/7L6yaqxEA0r)  The Dr. Barger T5 Wellness Plan by Dr. Danish Barger MD  Fitlosophy Fitspiration - journal to better health (found at [employer] in fitness aisle)  What Happened to You?- a look at the impact trauma has on behavior written by Ernesto Valadez and Dr. Pierce Mora  Whole Again by Claude Baldwin - discovering your true self after trauma  John Mckeon talk on The Shared Web, The Call to Courage  Podcasts: The Exam Room by the Physician's Committee, Nutrition Facts by Dr. Berg    We are here to support you with weight loss, but please remember that you still need your primary care provider for your routine health maintenance.

## 2024-12-13 NOTE — PROGRESS NOTES
[facility] WEIGHT MANAGEMENT VIRTUAL ENCOUNTER     Tami Serrano verbally consents to a Virtual/Telephone Check-In service on 12/13/24   Patient understands and accepts financial responsibility for any deductible, co-insurance and/or co-pays associated with this service.    HISTORY OF PRESENT ILLNESS  Chief Complaint   Patient presents with    Other     F/u on weight management      Tami Serrano is a 39 year old female is being evaluated as a video visit using Telemedicine with live, interactive video and audio    Weight gain/loss since LOV based on home monitoring:   Home scale: 193 lbs   Has lost  #18 lbs since LOV 6 months ago     Compliance with medication: wegovy 2.4mg weekly   Tolerating well, helping with decreasing appetite and no side effects     Work schedule changed and didn't have a lot of to workout   Feels like on someday's she might be under eating   Exercise/Activity:  not doing anything routine as far as exercise  Nutrition: eating regular meals, +protein, minimal veggies. not tracking reports  Stress is manageable   Sleep: 6-7 hours/night, waking up feeling rested    Denies chest pain, shortness of breath, dizziness, blurred vision, headache, paresthesia, nausea/vomiting.     Wt Readings from Last 6 Encounters:   12/07/24 196 lb (88.9 kg)   11/13/24 196 lb (88.9 kg)   10/31/24 196 lb (88.9 kg)   10/31/24 198 lb 12.8 oz (90.2 kg)   07/14/24 206 lb (93.4 kg)   06/24/24 211 lb (95.7 kg)          Subjective  REVIEW OF SYSTEMS  GENERAL HEALTH: feels well otherwise, denied any fevers chills or night sweats   RESPIRATORY: denies shortness of breath   CARDIOVASCULAR: denies chest pain  GI: denies abdominal pain  PSYCH: denies any mood changes    Objective  EXAM  Reviewed most recent set of vitals   Physical Exam:  GENERAL: well developed, well nourished, in no apparent distress, speaking in full sentences comfortably   SKIN: warm, pink, dry without rashes to exposed area   EYES:  conjunctiva pink  HEENT: atraumatic, normocephalic  LUNGS: normal work of breathing, non labored  CARDIO: normal work, no exertion  EXTREMITIES: no cyanosis, no clubbing, no edema  NEURO: Oriented times three  PSYCH: pleasant, cooperative, normal mood and affect    Lab Results   Component Value Date    WBC 6.2 12/07/2024    RBC 3.84 12/07/2024    HGB 8.9 (L) 12/07/2024    HCT 28.8 (L) 12/07/2024    MCV 75.0 (L) 12/07/2024    MCH 23.2 (L) 12/07/2024    MCHC 30.9 (L) 12/07/2024    RDW 16.3 12/07/2024    .0 12/07/2024    MPV 9.8 07/03/2012     Lab Results   Component Value Date    GLU 78 12/07/2024    BUN 10 12/07/2024    BUNCREA 12.0 10/11/2021    CREATSERUM 1.08 (H) 12/07/2024    ANIONGAP 3 12/07/2024     11/11/2017    GFRNAA 75 02/18/2022    GFRAA 86 02/18/2022    CA 9.1 12/07/2024    OSMOCALC 288 12/07/2024    ALKPHO 37 12/07/2024    AST 13 12/07/2024    ALT 10 12/07/2024    BILT 0.5 12/07/2024    TP 6.8 12/07/2024    ALB 4.1 12/07/2024    GLOBULIN 2.7 12/07/2024     12/07/2024    K 3.9 12/07/2024     12/07/2024    CO2 27.0 12/07/2024     Lab Results   Component Value Date     03/31/2022    A1C 5.2 03/31/2022     Lab Results   Component Value Date    CHOLEST 163 11/09/2023    TRIG 73 11/09/2023    HDL 45 11/09/2023     (H) 11/09/2023    VLDL 12 11/09/2023    NONHDLC 118 11/09/2023     Lab Results   Component Value Date    TSH 1.730 11/09/2023     Lab Results   Component Value Date    B12 385 03/31/2022     Lab Results   Component Value Date    VITD 21.6 (L) 03/31/2022       Medications Ordered Prior to Encounter[1]    ASSESSMENT  Analyzed weight data:       Diagnoses and all orders for this visit:    Therapeutic drug monitoring    Obesity (BMI 30-39.9)    Benign essential hypertension    Snoring    Anxiety and depression    APRIL on CPAP        PLAN  Continue with medications: wegovy 2.4mg weekly   --advised of side effects and adverse effects of this  medication  Contradictions: has done phentermine in the past, will avoid due to high BPs, saxenda   Reviewed labs  Will continue with high dose vitamin d   Will continue with vitamin b12 OTC  HTN uncontrolled with last office appt visit- but has been stable at other PCPs offices since then, will continue to monitor   Anxiety/depression, stable  APRIL- has orders pended for sleep study  Great work with getting in physical activity!   Advised to monitor blood pressure and pulse at home/ given parameters to review and contact provider.  Nutrition: low carb diet/ recommended to eat breakfast daily/ regular protein intake  Follow up with dietitian and psychologist as recommended.  Discussed the role of sleep and stress in weight management.  Counseled on comprehensive weight loss plan including attention to nutrition, exercise and behavior/stress management for success. See patient instruction below for more details.  Discussed strategies to overcome barriers to successful weight loss and weight maintenance  FITTE: ACSM recommendations (150-300 minutes/ week in active weight loss)       There are no Patient Instructions on file for this visit.    No follow-ups on file.    Patient verbalizes understanding.    Total time spent on chart review, pre-charting, obtaining history, counseling, and educating, reviewing labs was 23 minutes.       Pt understands phone/video evaluation is not a substitute for face to face examination or emergency care. Pt advised to go to the ER or call 911 for worsening symptoms or acute distress.       Please note that the following visit was completed using two-way, real-time interactive audio and/or video communication.  This has been done in good maverick to provide continuity of care in the best interest of the provider-patient relationship, due to the ongoing public health crisis/national emergency and because of restrictions of visitation.  There are limitations of this visit as no physical exam  could be performed.  Every conscious effort was taken to allow for sufficient and adequate time.  This billing was spent on reviewing labs, medications, radiology tests and decision making.  Appropriate medical decision-making and tests are ordered as detailed in the plan of care above.     NOTE TO PATIENT: The 21st Century Cures Act makes clinical notes like these available to patients in the interest of transparency. Clinical notes are medical documents used by physicians and care providers to communicate with each other. These documents include medical language and terminology, abbreviations, and treatment information that may sound technical and at times possibly unfamiliar. In addition, at times, the verbiage may appear blunt or direct. These documents are one tool providers use to communicate relevant information and clinical opinions of the care providers in a way that allows common understanding of the clinical context.     Sally Guerrero, APRN  12/13/2024          [1]   Current Outpatient Medications on File Prior to Visit   Medication Sig Dispense Refill    polyethylene glycol, PEG 3350, 17 g Oral Powd Pack Take 17 g by mouth daily as needed. 12 each 0    docusate sodium 100 MG Oral Cap Take 1 capsule (100 mg total) by mouth 2 (two) times daily. 60 capsule 0    semaglutide-weight management (WEGOVY) 2.4 MG/0.75ML Subcutaneous Solution Auto-injector Inject 0.75 mL (2.4 mg total) into the skin once a week. 3 mL 1     No current facility-administered medications on file prior to visit.

## 2025-01-09 DIAGNOSIS — Z51.81 THERAPEUTIC DRUG MONITORING: ICD-10-CM

## 2025-01-09 DIAGNOSIS — E66.9 OBESITY (BMI 30-39.9): ICD-10-CM

## 2025-01-09 RX ORDER — SEMAGLUTIDE 2.4 MG/.75ML
2.4 INJECTION, SOLUTION SUBCUTANEOUS WEEKLY
Qty: 3 ML | Refills: 1 | Status: SHIPPED | OUTPATIENT
Start: 2025-01-09

## 2025-01-09 NOTE — TELEPHONE ENCOUNTER
Requesting   Requested Prescriptions     Pending Prescriptions Disp Refills    semaglutide-weight management (WEGOVY) 2.4 MG/0.75ML Subcutaneous Solution Auto-injector 3 mL 1     Sig: Inject 0.75 mL (2.4 mg total) into the skin once a week.       LOV: 12/13/2024  RTC: n/a  Filled: 10/110/2024 #3ml with 1 refills    Future Appointments   Date Time Provider Department Center   1/28/2025 11:40 AM Sally Guerrero APRN [facility]

## 2025-01-28 ENCOUNTER — OFFICE VISIT (OUTPATIENT)
Dept: INTERNAL MEDICINE CLINIC | Facility: CLINIC | Age: 40
End: 2025-01-28
Payer: COMMERCIAL

## 2025-01-28 VITALS
BODY MASS INDEX: 34.66 KG/M2 | HEIGHT: 65 IN | WEIGHT: 208 LBS | HEART RATE: 80 BPM | DIASTOLIC BLOOD PRESSURE: 88 MMHG | RESPIRATION RATE: 16 BRPM | SYSTOLIC BLOOD PRESSURE: 130 MMHG

## 2025-01-28 DIAGNOSIS — G47.33 OSA ON CPAP: ICD-10-CM

## 2025-01-28 DIAGNOSIS — I10 BENIGN ESSENTIAL HYPERTENSION: ICD-10-CM

## 2025-01-28 DIAGNOSIS — F41.9 ANXIETY AND DEPRESSION: ICD-10-CM

## 2025-01-28 DIAGNOSIS — R06.83 SNORING: ICD-10-CM

## 2025-01-28 DIAGNOSIS — E66.9 OBESITY (BMI 30-39.9): ICD-10-CM

## 2025-01-28 DIAGNOSIS — F32.A ANXIETY AND DEPRESSION: ICD-10-CM

## 2025-01-28 DIAGNOSIS — Z51.81 THERAPEUTIC DRUG MONITORING: Primary | ICD-10-CM

## 2025-01-28 PROCEDURE — 3075F SYST BP GE 130 - 139MM HG: CPT | Performed by: NURSE PRACTITIONER

## 2025-01-28 PROCEDURE — 3079F DIAST BP 80-89 MM HG: CPT | Performed by: NURSE PRACTITIONER

## 2025-01-28 PROCEDURE — 3008F BODY MASS INDEX DOCD: CPT | Performed by: NURSE PRACTITIONER

## 2025-01-28 PROCEDURE — 99214 OFFICE O/P EST MOD 30 MIN: CPT | Performed by: NURSE PRACTITIONER

## 2025-01-28 RX ORDER — VILAZODONE HYDROCHLORIDE 10 MG/1
10 TABLET ORAL DAILY
COMMUNITY
Start: 2024-11-27 | End: 2025-01-28 | Stop reason: ALTCHOICE

## 2025-01-28 NOTE — PATIENT INSTRUCTIONS
Next steps:  1.  Fill your prescribed medication and take as discussed and prescribed: wegovy 2.4mg weekly   2.  Schedule a personal nutrition consultation with one of our registered dieticians     Please try to work on the following dietary changes:  Daily protein recommendation to start:  grams  Daily carbohydrate: <125g  Daily calories: 1,400-1,500  1.  Drink water with meals and throughout the day, cut down on soda and/or juice if consumed. Consider flavored water options like Bubbly, Spindrift, Hint and Ines.  2.  Eat breakfast daily and focus on having protein with each meal, examples include: greek yogurt, cottage cheese, hard boiled egg, whole grain toast with peanut butter.   3.  Reduce refined carbohydrates and sugars which includes items such as sweets, as well as rice, pasta, and bread and make sure to choose whole grain options when having them with just 1 serving per meal about the size of your inner palm.  4.  Consume non starchy veggies daily working towards making them a good 50% of your daily food intake. Add them to lunch and dinner consistently.  5.  Start a daily probiotic: VSL#3 is recommended, (order on line at www.vsl3.com). Take 1 capsule daily with water for 30 days, then reduce to 1 every other day (this will reduce the cost). Capsules can be left out for 2 weeks, but then must be refrigerated.      Please download courtney My Fitness Pal, LoseIt! Or Net Diary to monitor daily dietary intake and you will be able to see if you are eating the right amount of calories or too much or too little which would hinder weight loss. Additionally this will help to see your daily carbohydrate and protein intake. When you set the courtney up choose 1-2 lbs/week as a goal.  Keeping a paper food journal is an option as well to remain accountable for your choices- this is the start to mindful eating! A low calorie diet has been consistently shown to support weight loss.     Continue or start exercising to  help establish a routine. If not already exercising begin with 1 day and progress as able with long-term goal of 30 minutes 5 days a week at a minimum.     Meditation daily can help manage and control stress. Chronic stress can make weight loss difficult.  Exercising is one way to help with stress, but meditation using the CALM Peggy or another comparable alternative can be done in your home or place of work with little time commitment. This Peggy can also help work on behavior change and improve sleep. Check out the segment under Calm Masterclass and listen to The 4 Pillars of Health. A great way to begin learning about the foundation of lifestyle with practical tips to use in your every day.     Check out www.yourweightmatters.org blog for continued daily support and education along this weight loss journey!    Patient Resources:     Personal Training/Fitness Classes/Health Coaching     [facility] @ [email or web address]-driven/[email or web address]-center Full fitness center with group fitness and personal training. Discount available as client of [facility].  Health Coaching and Personal Training with Rosalee Vivar at our [facility]- individual weekly coaching with option to add personal training and small group fitness classes targeted at weight loss- [phone] and/or email @ [email or web address]  360FIT [place] [email or web address]://www.[email or web address]. Group Fitness [phone] and/or email Kim at [email or web address]  [facility] Fitness Centers with multiple locations: Keep Your Pharmacy Open (www.MOAEC), Eat The Shutter Guardian Fitness (www.GraffitiTech.Health: Elt), Fit Body Bootcamp (www.123ContactFormbodyboTypeformp.Health: Elt), Vivense Home & Living (www.Acision.Health: [place]), The Exercise  (www.exercisecoach.Health: [place])     Online Fitness  Fitness  on Utube  Fit in 10 DVD series- www.jcimp54OPF.com  Sit and Be Fit - Chair exercise series Www.sitandbefit.org  Hip  Hop Fit with Chicho Mc at www.hiphopfit.net     Apps for on the Go Fitness  [place] 7 Minute Workout (orange box with white 7) - free on the go HIIT training peggy  Peloton Peggy @ www.onepeloton.com     Nutrition Trackers and Tools  LoseIT! And My Fitness Pal apps and on line for tracking nutrition  NOOM - virtual health coaching  FitFoundation (healthy meals on the go) in [place] @ www.uzohurbiiylre9m.Thumb Friendly  Bistro MD @ Fish Nature.bistromd.com and Jsflow81 (keto and low carb plans recommended) @ www.huvwol39.com, Metabolic Meals @ www.MyMetabolicMeals.com - individual prepared meals to go  Gobble, Blue Apron, Home , Every Plate, Sunbasket- on line meal delivery programs for preparation at home  Meal Village in [place] for homemade meals to go @ www.mealvillage.Thumb Friendly  Diet Doctor @ www.dietdoctor.Thumb Friendly - low carb swaps  YuChannel Intellect - meal prep and planning peggy (www.yummly.com)     Stress Management/Behavior/Mindful Eating  CALM meditation peggy (www.calm.com)  Headspace  Am I Hungry? Mindful eating virtual  peggy  Www.yourweightmatters.org - Obesity Action Coalition sponsored Blog posts daily  Motivation peggy (black box with white \")- daily supportive messages sent to your phone     Books/Video Education/Podcasts  Mindless Eating by Fadi Morrison  Why We Get Sick by Denny Mason (a book about insulin resistance)  Atomic Habits by Mir Wheeler (a book about taking small steps to promote greater behavior change)   Can't Hurt Me by Quang Taylor (a book exploring the power of discipline in achieving your goals)  The End of Dieting: How to Live for Life by Dr. Tomas Campbell M.D. or listen to The [employer] Podcast Episode 63: Understanding \"Nutritarian\" Eating w/Dr. Tomas Campbell  Your Body in Balance: The New Science of Food, Hormones, and Health by Dr. Filipe Galvan  The Menopause Diet Plan by Aubree Holman and Alexa Chowdhury  The Complete Guide to fasting by Dr. Walker  Sugar, Salt & Fat by Chaparrita Covarrubias, Ph.D, R.D.  Weight  Loss Surgery Will Not Treat Food Addiction by Ariella Mendoza Ph.D  The Game Changers- Taquillaix Documentary on plant based nutrition  Fed Up - documentary about obesity (Free on Utube)  The Truth About Sugar - documentary on sugar (Free on Utube, https://youOneAssist Consumer Solutionsu.be/5H1osdvML0m)  The Dr. Barger T5 Wellness Plan by Dr. Danish Barger MD  Fitlosophy Fitspiration - journal to better health (found at [employer] in fitness aisle)  What Happened to You?- a look at the impact trauma has on behavior written by Ernesto Valadez and Dr. Pierce Mora  Whole Again by Claude Baldwin - discovering your true self after trauma  John Mckeon talk on D[employer], The Call to Courage  Podcasts: The Exam Room by the Physician's Committee, Nutrition Facts by Dr. Berg    We are here to support you with weight loss, but please remember that you still need your primary care provider for your routine health maintenance.

## 2025-01-28 NOTE — PROGRESS NOTES
HISTORY OF PRESENT ILLNESS  Chief Complaint   Patient presents with    Weight Check     Up 15     Tami Serrano is a 39 year old female here for follow up with medical weight loss program for the treatment of overweight, obesity, or morbid obesity.     Up #15 lbs (from 12/2024 via telemedicine)   Compliant on wegovy 2.4mg weekly   Tolerating well, helping with decreasing appetite and no side effects     Has been feeling more hungry   High stress the past month  Admits that she didn't do so good with over the holidays'   Success: I'm making progress  Challenging: trying to development a consistent workout schedule   Exercise/Activity: 3 days per week- video (light weights), cardio 15-20 mins   Nutrition: eating regular meals, +protein, minimal veggies. not tracking reports  Meals out per week on average: 3  Stress is manageable- 9/10   Sleep: 6 hours/night, waking up feeling rested    Denies chest pain, shortness of breath, dizziness, blurred vision, headache, paresthesia, nausea/vomiting.     Breakfast Lunch Dinner Snacks Fluids   Reviewed              Wt Readings from Last 6 Encounters:   01/28/25 208 lb (94.3 kg)   12/07/24 196 lb (88.9 kg)   11/13/24 196 lb (88.9 kg)   10/31/24 196 lb (88.9 kg)   10/31/24 198 lb 12.8 oz (90.2 kg)   07/14/24 206 lb (93.4 kg)          REVIEW OF SYSTEMS  GENERAL: feels well otherwise, denied any fevers chills or night sweats   LUNGS: denies shortness of breath  CARDIOVASCULAR: denies chest pain  GI: denies abdominal pain  MUSCULOSKELETAL: denies back pain, joint pains   PSYCH: denies change in behavior or mood, denies feeling sad or depressed    EXAM  /88   Pulse 80   Resp 16   Ht 5' 5\" (1.651 m)   Wt 208 lb (94.3 kg)   LMP 11/20/2024 (Exact Date)   BMI 34.61 kg/m²       GENERAL: well developed, well nourished, in no apparent distress, A/O x3  SKIN: no rashes, no suspicious lesions  HEENT: atraumatic, normocephalic, OP-clear, PERRLA  NECK: supple, no  adenopathy  LUNGS: CTA in all fields, breathing non labored  CARDIO: RRR without murmur  GI: +BS, NT/ND, no masses or HSM  EXTREMITIES: no cyanosis, no clubbing, no edema    Lab Results   Component Value Date    GLU 78 12/07/2024    BUN 10 12/07/2024    BUNCREA 12.0 10/11/2021    CREATSERUM 1.08 (H) 12/07/2024    ANIONGAP 3 12/07/2024     11/11/2017    GFRNAA 75 02/18/2022    GFRAA 86 02/18/2022    CA 9.1 12/07/2024    OSMOCALC 288 12/07/2024    ALKPHO 37 12/07/2024    AST 13 12/07/2024    ALT 10 12/07/2024    BILT 0.5 12/07/2024    TP 6.8 12/07/2024    ALB 4.1 12/07/2024    GLOBULIN 2.7 12/07/2024     12/07/2024    K 3.9 12/07/2024     12/07/2024    CO2 27.0 12/07/2024     Lab Results   Component Value Date     03/31/2022    A1C 5.2 03/31/2022     Lab Results   Component Value Date    CHOLEST 163 11/09/2023    TRIG 73 11/09/2023    HDL 45 11/09/2023     (H) 11/09/2023    VLDL 12 11/09/2023    NONHDLC 118 11/09/2023     Lab Results   Component Value Date    B12 385 03/31/2022     Lab Results   Component Value Date    VITD 21.6 (L) 03/31/2022       Medications Ordered Prior to Encounter[1]    ASSESSMENT/PLAN    ICD-10-CM    1. Therapeutic drug monitoring  Z51.81       2. Obesity (BMI 30-39.9)  E66.9       3. Benign essential hypertension  I10       4. Snoring  R06.83       5. APRIL on CPAP  G47.33       6. Anxiety and depression  F41.9     F32.A           PLAN   Initial Weight Data and Goal Weight Loss:  Initial consult: #252 lbs on 3/2022  Weight Calculations  Initial Weight: 252 lbs  Initial Weight Date: 03/01/22  Today's Weight: 208 lbs  5% Goal: 12.6  10% Goal: 25.2  Total Weight Loss: 44 lbs  Total weight loss: up #15 lbs total, Net loss 44 lbs  Continue with medications: wegovy 2.4mg weekly (discussed possibly switching over to zepbound)- but is going to check with insurance first  --advised of side effects and adverse effects of this medication  Contradictions: has done  phentermine in the past, will avoid due to high BPs, saxenda   Reviewed labs  Will continue with high dose vitamin d   Will continue with vitamin b12 OTC  HTN  stable, will continue to monitor, managed by PCP   Anxiety/depression, stable  APRIL- has orders pended for sleep study (but doesn't feel like she needs it anymore)   Great work with getting in physical activity!   Wrote out macros and encouraged tracking food   Nutrition: Low carb diet, recommended to eat breakfast daily/ regular protein intake  Follow up with dietitian and psychologist as recommended.  Discussed the role of sleep and stress in weight management.  Counseled on comprehensive weight loss plan including attention to nutrition, exercise and behavior/stress management for success. See patient instruction below for more details.  Discussed strategies to overcome barriers to successful weight loss and weight maintenance  FITTE: ACSM recommendations (150-300 minutes/ week in active weight loss)   Weight Loss Consent to treat reviewed and signed.    Total time spent on chart review, pre-charting, obtaining history, counseling, and educating, reviewing labs was 30 minutes.       NOTE TO PATIENT: The 21st Century Cures Act makes clinical notes like these available to patients in the interest of transparency. Clinical notes are medical documents used by physicians and care providers to communicate with each other. These documents include medical language and terminology, abbreviations, and treatment information that may sound technical and at times possibly unfamiliar. In addition, at times, the verbiage may appear blunt or direct. These documents are one tool providers use to communicate relevant information and clinical opinions of the care providers in a way that allows common understanding of the clinical context.     There are no Patient Instructions on file for this visit.    No follow-ups on file.    Patient verbalizes understanding.    Sally Coppola  GANESH Guerrero             [1]   Current Outpatient Medications on File Prior to Visit   Medication Sig Dispense Refill    semaglutide-weight management (WEGOVY) 2.4 MG/0.75ML Subcutaneous Solution Auto-injector Inject 0.75 mL (2.4 mg total) into the skin once a week. 3 mL 1     No current facility-administered medications on file prior to visit.

## 2025-02-15 ENCOUNTER — PATIENT MESSAGE (OUTPATIENT)
Dept: INTERNAL MEDICINE CLINIC | Facility: CLINIC | Age: 40
End: 2025-02-15

## 2025-02-15 DIAGNOSIS — E66.9 OBESITY (BMI 30-39.9): Primary | ICD-10-CM

## 2025-02-17 RX ORDER — TIRZEPATIDE 7.5 MG/.5ML
7.5 INJECTION, SOLUTION SUBCUTANEOUS WEEKLY
Qty: 2 ML | Refills: 1 | Status: SHIPPED | OUTPATIENT
Start: 2025-02-17 | End: 2025-03-11

## 2025-02-17 NOTE — TELEPHONE ENCOUNTER
[facility]- please advise  -patient on last Wegovy 2.4mg refill; states Zepbound is covered by insurance.  Would you like patient to switch?      LOV 1/28/25  PLAN   Initial Weight Data and Goal Weight Loss:  Initial consult: #252 lbs on 3/2022  Weight Calculations  Initial Weight: 252 lbs  Initial Weight Date: 03/01/22  Today's Weight: 208 lbs  5% Goal: 12.6  10% Goal: 25.2  Total Weight Loss: 44 lbs  Total weight loss: up #15 lbs total, Net loss 44 lbs  Continue with medications: wegovy 2.4mg weekly (discussed possibly switching over to zepbound)- but is going to check with insurance first

## 2025-04-07 NOTE — TELEPHONE ENCOUNTER
Requesting zepbound  LOV: 1/28/25  RTC: 5 months  Last Relevant Labs: na  Filled: 2/17/25 #2 ml with 1 refills  7.5 mg dose    Future Appointments   Date Time Provider Department Center   8/4/2025  1:20 PM Sally Guerrero APRN [facility]     1/28/25 208# now 199.2#

## 2025-05-16 NOTE — TELEPHONE ENCOUNTER
Requesting zepbound increase?  LOV: 1/28/25   RTC: 5 months  Last Relevant Labs: na  Filled: 4/11/25 #2 ml with 1 refills    Future Appointments   Date Time Provider Department Center   5/28/2025  3:30 PM Conner Jarquin MD PF [facility]   8/4/2025  1:20 PM Sally Guerrero APRN [facility]     1/28/25 208#  now 194#

## 2025-05-28 NOTE — PROGRESS NOTES
Patient is here for MD follow up for Anemia. Labs drawn today. Patient reports heavy periods. She is very tired, feels irritable and chewing on a lot of ice.       Education Record    Learner:  Patient    Disease / Diagnosis:  Anemia    Barriers / Limitations:  None   Comments:    Method:  Discussion   Comments:    General Topics:  Plan of care reviewed   Comments:    Outcome:  Shows understanding   Comments:

## 2025-05-30 ENCOUNTER — TELEPHONE (OUTPATIENT)
Facility: LOCATION | Age: 40
End: 2025-05-30

## 2025-06-01 NOTE — PROGRESS NOTES
[facility] Progress Note       Patient Name: Tami Serrano   YOB: 1985  Medical Record Number: [id number]  Attending Physician: Conner Jarquin M.D.      The  Century Cures Act makes medical notes like these available to patients in the interest of transparency. Please be advised this is a medical document. Medical documents are intended to carry relevant information, facts as evident, and the clinical opinion of the practitioner. The medical note is intended as peer to peer communication and may appear blunt or direct. It is written in medical language and may contain abbreviations or verbiage that are unfamiliar.      Date of Visit: 2025       Chief Complaint  Iron deficiency anemia - follow up.    History of Present Illness  Tami Serrano is a 39 year old female with recurrent iron deficiency anemia. Patient last received IV iron dextran therapy in 2024. Patient repeatedly fails to follow up as recommended. She previously was advised to consult with gastroenterology for endoscopy but has not done so. She has declined intervention by gynecology for menorrhagia.     Patient returns now with complaint of fatigue and dyspnea with exertion. Her last set of laboratory studies was in 2024 and showed a microcytic hypochromic anemia with hemoglobin of 8.9 g/dl.     Patient reports heavy menstrual periods. She denies melena or bright red blood per rectum.     Past Medical History (historical data, reviewed by physician)   Pre-eclampsia; obstructive sleep apnea; essential hypertension.    Past Surgical History (historical data, reviewed by physician)   ; tubal ligation.     Family History (historical data, reviewed by physician)   Paternal grandfather, paternal uncle, paternal aunts x 2 with cancer but she does not know the types. While in the room, she called her father who said he also does not know the types of cancer in the family.      Social History (historical data, reviewed by physician)   Previous tobacco use but quit in 2017.      Current Medications   Tirzepatide-Weight Management (ZEPBOUND) 12.5 MG/0.5ML Subcutaneous Solution Auto-injector Inject 12.5 mg into the skin once a week for 4 doses. 2 mL 1     Allergies   Ms. Serrano is allergic to seasonal.     Vital Signs   BP (!) 173/110 (BP Location: Left arm, Patient Position: Sitting, Cuff Size: large)   Pulse 76   Temp 98.1 °F (36.7 °C) (Temporal)   Resp 18   Wt 90.7 kg (200 lb)   LMP 11/20/2024 (Exact Date)   SpO2 100%   BMI 33.28 kg/m²      Physical Examination   Constitutional      Well developed, well nourished. No apparent distress.   Head   Normocephalic and atraumatic.  Eyes   Conjunctiva clear; sclera anicteric.  ENMT                 External nose normal; external ears normal.  Respiratory          Normal effort; no respiratory distress; lungs clear to auscultation bilaterally.  Cardiovascular     Regular rate and rhythm.  Abdomen            Not distended.   Neurologic           Motor and sensory grossly intact.  Psychiatric          Mood and affect appropriate.      Laboratory   Recent Results (from the past week)   IRON AND TIBC [E]    Collection Time: 05/28/25  3:15 PM   Result Value Ref Range    Iron 8 (L) 50 - 170 ug/dL    Transferrin 347 250 - 380 mg/dL    Total Iron Binding Capacity 430 (H) 250 - 425 ug/dL    % Saturation 2 (L) 15 - 50 %   FERRITIN [E]    Collection Time: 05/28/25  3:15 PM   Result Value Ref Range    Ferritin 2 (L) 50 - 306 ng/mL   CBC W/DIFF [E]    Collection Time: 05/28/25  3:15 PM   Result Value Ref Range    WBC 9.1 4.0 - 11.0 x10(3) uL    RBC 4.41 3.80 - 5.30 x10(6)uL    HGB 8.1 (L) 12.0 - 16.0 g/dL    HCT 28.5 (L) 35.0 - 48.0 %    .0 150.0 - 450.0 10(3)uL    MCV 64.6 (L) 80.0 - 100.0 fL    MCH 18.4 (L) 26.0 - 34.0 pg    MCHC 28.4 (L) 31.0 - 37.0 g/dL    RDW 19.1 %    Neutrophil Absolute Prelim 5.34 1.50 - 7.70 x10 (3) uL    Neutrophil  Absolute 5.34 1.50 - 7.70 x10(3) uL    Lymphocyte Absolute 2.97 1.00 - 4.00 x10(3) uL    Monocyte Absolute 0.55 0.10 - 1.00 x10(3) uL    Eosinophil Absolute 0.16 0.00 - 0.70 x10(3) uL    Basophil Absolute 0.06 0.00 - 0.20 x10(3) uL    Immature Granulocyte Absolute 0.02 0.00 - 1.00 x10(3) uL    Neutrophil % 58.7 %    Lymphocyte % 32.6 %    Monocyte % 6.0 %    Eosinophil % 1.8 %    Basophil % 0.7 %    Immature Granulocyte % 0.2 %   PATH COMMENT CBC    Collection Time: 05/28/25  3:15 PM   Result Value Ref Range    Path Comment CBC       Pathologist review of peripheral blood smear for microcytosis.  Microcytic, hypochromic anemia.  Adequate number of neutrophils and platelets with unremarkable morphology.    Reviewed by Adarsh Adams M.D. Pathology 05/29/25 at 5:34 PM       Impression and Plan   1.   Iron deficiency anemia: Laboratory studies show iron deficiency anemia. I recommend therapy with IV iron dextran. Patient is in agreement and will return this week for treatment. I recommend repeat laboratory studies in approximately 6 weeks. Patient will surely still be iron deficient and the remaining deficit will be addressed with further IV iron therapy.           Patient has been non-compliant with recommended follow up and workup. I again recommend that once she is iron replete, she undergo laboratory studies every 3 months so that the rate of iron loss can be documented and effectively addressed with IV iron.           I again recommend that she seek consultation with gastroenterology for endoscopic evaluation. She is provided with the contact information for [facility].           I again recommend that she consult with gynecology and discuss intervention to reduce her menstrual losses.     Planned Follow Up   As above.     Electronically Signed by:     Conner Jarquin M.D.  System Medical Director, Oncology Services  [facility]

## 2025-06-13 NOTE — PROGRESS NOTES
The following individual(s) verbally consented to be recorded using ambient AI listening technology and understand that they can each withdraw their consent to this listening technology at any point by asking the clinician to turn off or pause the recording:    Patient name: Oleksandrmaldonado Maria Antonia Serrano  Additional names:  prudencio

## 2025-06-13 NOTE — PROGRESS NOTES
Pt here for infed infusion . Pt denies any issues or concerns.           Pt tolerated infusion without difficulty or complaint. Reviewed next apt date/time: yes      Education Record  Learner:  Patient  Disease / Diagnosis: iron deficiency   Barriers / Limitations:  None  Method:  Brief focused and Discussion  General Topics:  Plan of care reviewed  Outcome:  Shows understanding     Patient  dc'd home in stable condition.

## 2025-06-21 PROBLEM — F32.81 PMDD (PREMENSTRUAL DYSPHORIC DISORDER): Status: ACTIVE | Noted: 2025-06-21

## 2025-06-21 NOTE — PROGRESS NOTES
CHIEF COMPLAINT:   Chief Complaint   Patient presents with    Menstrual Problem     Ppmd pre-menstrual dysphoric disorder          HPI:     Tmai Serrano is a 39 year old female presents for eval for PMDD.    HPI  History of Present Illness    Period problem: She experiences significant mood changes approximately 10 days before menstruation, describing herself as becoming 'mean like borderline evil'. Her mood stabilizes with the onset of menstruation. She has recently recognized this pattern, partly due to social media. Her LMP began 6/12/2025. She is not on hormonal birth control, she is s/p  tubal ligation.    She has a history of postpartum depression, previously treated with Zoloft, which caused adverse sexual side effects. She also tried Vyvanse but discontinued it after one dose due to feeling 'in a trance'.    She is currently on Zepbound for weight management, having transitioned from Wegovy since March 2023. Her weight has decreased from 274 pounds in 2022 to 194 pounds recently, though she notes bloating associated with her menstrual cycle.              HISTORY:  Past Medical History[1]   Past Surgical History[2]   Family History[3]   Social History: Short Social Hx on File[4]     Medications (Active prior to today's visit):  Current Medications[5]    Allergies:  Allergies[6]    PSFH elements reviewed from today and agreed except as otherwise stated in HPI.  ROS:     Review of Systems      Pertinent positives and negatives noted in the the HPI.    PHYSICAL EXAM:   BP (!) 120/102 (BP Location: Right arm, Patient Position: Sitting, Cuff Size: large)   Pulse 75   Temp 97.9 °F (36.6 °C) (Temporal)   Resp 18   Ht 5' 5\" (1.651 m)   Wt 204 lb 12.8 oz (92.9 kg)   LMP 06/12/2025 (Exact Date)   SpO2 99%   BMI 34.08 kg/m²   Vital signs reviewed.Appears stated age, well groomed.  Physical Exam  Vitals reviewed.   Constitutional:       Appearance: Normal appearance. She is obese.   HENT:      Head:  Normocephalic.   Cardiovascular:      Rate and Rhythm: Normal rate and regular rhythm.      Pulses: Normal pulses.      Heart sounds: Normal heart sounds.   Pulmonary:      Effort: Pulmonary effort is normal.      Breath sounds: Normal breath sounds.   Musculoskeletal:      Right lower leg: No edema.      Left lower leg: No edema.   Skin:     General: Skin is warm and dry.   Neurological:      Mental Status: She is alert and oriented to person, place, and time.   Psychiatric:         Mood and Affect: Mood normal.         Behavior: Behavior normal.         Thought Content: Thought content normal.         Judgment: Judgment normal.          LABS     Office Visit on 05/28/2025   Component Date Value    Iron 05/28/2025 8 (L)     Transferrin 05/28/2025 347     Total Iron Binding [place]* 05/28/2025 430 (H)     % Saturation 05/28/2025 2 (L)     Ferritin 05/28/2025 2 (L)     WBC 05/28/2025 9.1     RBC 05/28/2025 4.41     HGB 05/28/2025 8.1 (L)     HCT 05/28/2025 28.5 (L)     PLT 05/28/2025 396.0     MCV 05/28/2025 64.6 (L)     MCH 05/28/2025 18.4 (L)     MCHC 05/28/2025 28.4 (L)     RDW 05/28/2025 19.1     Neutrophil Absolute Prel* 05/28/2025 5.34     Neutrophil Absolute 05/28/2025 5.34     Lymphocyte Absolute 05/28/2025 2.97     Monocyte Absolute 05/28/2025 0.55     Eosinophil Absolute 05/28/2025 0.16     Basophil Absolute 05/28/2025 0.06     Immature Granulocyte Abs* 05/28/2025 0.02     Neutrophil % 05/28/2025 58.7     Lymphocyte % 05/28/2025 32.6     Monocyte % 05/28/2025 6.0     Eosinophil % 05/28/2025 1.8     Basophil % 05/28/2025 0.7     Immature Granulocyte % 05/28/2025 0.2     Path Comment CBC 05/28/2025 Pathologist review of peripheral blood smear for microcytosis.  Microcytic, hypochromic anemia.  Adequate number of neutrophils and platelets with unremarkable morphology.    Reviewed by Adarsh Adams M.D. Pathology 05/29/25 at 5:34 PM         REVIEWED THIS VISIT  ASSESSMENT/PLAN:   39 year old female  with    Assessment & Plan  Premenstrual Dysphoric Disorder (PMDD)  Severe emotional changes during luteal phase, resolving with menstruation. Previous SSRI adverse effects noted. Intermittent SSRI therapy discussed. Fluoxetine chosen for minimal side effects.  - START Fluoxetine 10 mg daily starting on day 14 of the menstrual cycle and stopping at menstruation onset.  - Monitor for side effects and efficacy over the next three months.  - f-u in 3 months or sooner prn              - FLUoxetine 10 MG Oral Cap; Take 1 tab PO daily starting Day 14 of menstrual cycle. Stop when period starts.  Dispense: 90 capsule; Refill: 0      Meds This Visit:  Requested Prescriptions     Signed Prescriptions Disp Refills    FLUoxetine 10 MG Oral Cap 90 capsule 0     Sig: Take 1 tab PO daily starting Day 14 of menstrual cycle. Stop when period starts.       Health Maintenance:  Health Maintenance   Topic Date Due    COVID-19 Vaccine (1 - 2024-25 season) Never done    Annual Physical  11/09/2024    Tobacco Cessation Counseling  Never done    HTN: BP Follow-Up  07/13/2025    Influenza Vaccine (Season Ended) 10/01/2025    Pap Smear  10/31/2027    DTaP,Tdap,and Td Vaccines (3 - Td or Tdap) 03/27/2028    Annual Depression Screening  Completed    Pneumococcal Vaccine: Birth to 50yrs  Aged Out    Meningococcal B Vaccine  Aged Out         Patient/Caregiver Education: There are no barriers to learning. Medical education done.   Outcome: Patient verbalizes understanding and agrees with plan. Advised to call or RTC if symptoms persist or worsen.    Problem List:   Problem List[7]    Imaging & Referrals:  None     6/21/2025  Alvina Manley MD      Patient understands plan and follow-up.  Return in about 3 months (around 9/13/2025) for annual physical, medication follow-up.              [1]   Past Medical History:   Allergic rhinitis    Hypertension in pregnancy, preeclampsia (HCC)    Morbid obesity with BMI of 40.0-44.9, adult (HCC)     Obesity    Obstructive apnea    DMG SPLIT AHI 47 SaO2  alonso 85% CPAP 6-16 HME    Obstructive sleep apnea    Sleep apnea    Unspecified essential hypertension    Visual impairment   [2]   Past Surgical History:  Procedure Laterality Date    Anesth, section             delivery only      Tubal ligation     [3]   Family History  Problem Relation Age of Onset    Diabetes Father     Hypertension Mother     Cancer Paternal Grandfather         skin   [4]   Social History  Socioeconomic History    Marital status: Single   Tobacco Use    Smoking status: Former     Current packs/day: 0.00     Types: Cigarettes     Start date: 9/10/2005     Quit date: 10/25/2017     Years since quittin.6     Passive exposure: Past    Smokeless tobacco: Never   Vaping Use    Vaping status: Every Day    Substances: Nicotine, THC    Devices: Disposable   Substance and Sexual Activity    Alcohol use: Yes     Alcohol/week: 3.0 standard drinks of alcohol     Types: 1 Glasses of wine, 2 Shots of liquor per week     Comment: occasionally    Drug use: Yes     Frequency: 1.0 times per week     Types: Cannabis     Comment: daily    Sexual activity: Yes     Partners: Male     Birth control/protection: Tubal Ligation   Other Topics Concern    Caffeine Concern No    Exercise No    Seat Belt No    Special Diet No    Stress Concern No    Weight Concern Yes     Social Drivers of Health      Received from [facility]   [5]   Current Outpatient Medications   Medication Sig Dispense Refill    Tirzepatide 12.5 MG/0.5ML Subcutaneous Solution Auto-injector       FLUoxetine 10 MG Oral Cap Take 1 tab PO daily starting Day 14 of menstrual cycle. Stop when period starts. 90 capsule 0   [6]   Allergies  Allergen Reactions    Seasonal OTHER (SEE COMMENTS)   [7]   Patient Active Problem List  Diagnosis    APRIL on CPAP    Benign essential hypertension    Morbid obesity (HCC)    S/P  section    Anxiety and depression     Anaclitic depression    Cyst of ovary    Pre-eclampsia (HCC)    Eczema    Menorrhagia    Iron deficiency anemia secondary to blood loss (chronic)

## 2025-07-10 ENCOUNTER — TELEPHONE (OUTPATIENT)
Age: 40
End: 2025-07-10

## 2025-07-11 ENCOUNTER — APPOINTMENT (OUTPATIENT)
Facility: LOCATION | Age: 40
End: 2025-07-11
Attending: SPECIALIST

## 2025-07-14 ENCOUNTER — APPOINTMENT (OUTPATIENT)
Facility: LOCATION | Age: 40
End: 2025-07-14
Attending: SPECIALIST

## 2025-07-14 RX ORDER — TIRZEPATIDE 10 MG/.5ML
INJECTION, SOLUTION SUBCUTANEOUS
Qty: 2 ML | Refills: 0 | OUTPATIENT
Start: 2025-07-14

## 2025-07-14 NOTE — TELEPHONE ENCOUNTER
Isatu Walker Edw [facility]  Hi, her insurance is rejected, we have left multiple messages and she hasn't called back. I left her another message today (7/11) letting her know were canceling the appts for labs today and infusion Monday (7/14).

## 2025-08-04 ENCOUNTER — APPOINTMENT (OUTPATIENT)
Dept: GENERAL RADIOLOGY | Age: 40
End: 2025-08-04
Attending: PHYSICIAN ASSISTANT

## 2025-08-04 ENCOUNTER — APPOINTMENT (OUTPATIENT)
Dept: GENERAL RADIOLOGY | Age: 40
End: 2025-08-04
Attending: EMERGENCY MEDICINE

## 2025-08-04 ENCOUNTER — HOSPITAL ENCOUNTER (EMERGENCY)
Age: 40
Discharge: HOME | End: 2025-08-04
Attending: EMERGENCY MEDICINE

## 2025-08-04 VITALS
DIASTOLIC BLOOD PRESSURE: 106 MMHG | OXYGEN SATURATION: 100 % | HEIGHT: 65 IN | HEART RATE: 73 BPM | WEIGHT: 210 LBS | SYSTOLIC BLOOD PRESSURE: 160 MMHG | BODY MASS INDEX: 34.99 KG/M2 | RESPIRATION RATE: 18 BRPM | TEMPERATURE: 98 F

## 2025-08-04 DIAGNOSIS — I10 HYPERTENSION, UNSPECIFIED TYPE: ICD-10-CM

## 2025-08-04 DIAGNOSIS — R20.2 PARESTHESIA OF HAND: Primary | ICD-10-CM

## 2025-08-04 LAB
ALBUMIN SERPL-MCNC: 4.3 G/DL (ref 3.2–4.8)
ALBUMIN/GLOB SERPL: 1.5 (ref 1–2)
ALP LIVER SERPL-CCNC: 39 U/L (ref 37–98)
ALT SERPL-CCNC: 11 U/L (ref 10–49)
ANION GAP SERPL CALC-SCNC: 10 MMOL/L (ref 0–18)
AST SERPL-CCNC: 15 U/L (ref ?–34)
ATRIAL RATE: 61 BPM
BASOPHILS # BLD AUTO: 0.07 X10(3) UL (ref 0–0.2)
BASOPHILS NFR BLD AUTO: 0.9 %
BILIRUB SERPL-MCNC: 0.3 MG/DL (ref 0.3–1.2)
BUN BLD-MCNC: 15 MG/DL (ref 9–23)
CALCIUM BLD-MCNC: 9.1 MG/DL (ref 8.7–10.6)
CHLORIDE SERPL-SCNC: 106 MMOL/L (ref 98–112)
CO2 SERPL-SCNC: 24 MMOL/L (ref 21–32)
CREAT BLD-MCNC: 1.04 MG/DL (ref 0.55–1.02)
EGFRCR SERPLBLD CKD-EPI 2021: 71 ML/MIN/1.73M2 (ref 60–?)
EOSINOPHIL # BLD AUTO: 0.23 X10(3) UL (ref 0–0.7)
EOSINOPHIL NFR BLD AUTO: 2.8 %
GLOBULIN PLAS-MCNC: 2.8 G/DL (ref 2–3.5)
GLUCOSE BLD-MCNC: 86 MG/DL (ref 70–99)
HCT VFR BLD AUTO: 36.7 % (ref 35–48)
HGB BLD-MCNC: 11.3 G/DL (ref 12–16)
IMM GRANULOCYTES # BLD AUTO: 0.02 X10(3) UL (ref 0–1)
IMM GRANULOCYTES NFR BLD: 0.2 %
LYMPHOCYTES # BLD AUTO: 2.13 X10(3) UL (ref 1–4)
LYMPHOCYTES NFR BLD AUTO: 26.1 %
MCH RBC QN AUTO: 25.3 PG (ref 26–34)
MCHC RBC AUTO-ENTMCNC: 30.8 G/DL (ref 31–37)
MCV RBC AUTO: 82.3 FL (ref 80–100)
MONOCYTES # BLD AUTO: 0.72 X10(3) UL (ref 0.1–1)
MONOCYTES NFR BLD AUTO: 8.8 %
NEUTROPHILS # BLD AUTO: 4.99 X10 (3) UL (ref 1.5–7.7)
NEUTROPHILS # BLD AUTO: 4.99 X10(3) UL (ref 1.5–7.7)
NEUTROPHILS NFR BLD AUTO: 61.2 %
OSMOLALITY SERPL CALC.SUM OF ELEC: 290 MOSM/KG (ref 275–295)
P AXIS: -7 DEGREES
P-R INTERVAL: 166 MS
PLATELET # BLD AUTO: 259 10(3)UL (ref 150–450)
PLATELET MORPHOLOGY: NORMAL
POTASSIUM SERPL-SCNC: 4 MMOL/L (ref 3.5–5.1)
PROT SERPL-MCNC: 7.1 G/DL (ref 5.7–8.2)
Q-T INTERVAL: 408 MS
QRS DURATION: 90 MS
QTC CALCULATION (BEZET): 410 MS
R AXIS: 6 DEGREES
RBC # BLD AUTO: 4.46 X10(6)UL (ref 3.8–5.3)
SODIUM SERPL-SCNC: 140 MMOL/L (ref 136–145)
T AXIS: 14 DEGREES
TROPONIN I SERPL HS-MCNC: 3 NG/L (ref ?–34)
VENTRICULAR RATE: 61 BPM
WBC # BLD AUTO: 8.2 X10(3) UL (ref 4–11)

## 2025-08-04 PROCEDURE — 84484 ASSAY OF TROPONIN QUANT: CPT | Performed by: EMERGENCY MEDICINE

## 2025-08-04 PROCEDURE — 85025 COMPLETE CBC W/AUTO DIFF WBC: CPT | Performed by: EMERGENCY MEDICINE

## 2025-08-04 PROCEDURE — 72050 X-RAY EXAM NECK SPINE 4/5VWS: CPT | Performed by: PHYSICIAN ASSISTANT

## 2025-08-04 PROCEDURE — 93005 ELECTROCARDIOGRAM TRACING: CPT

## 2025-08-04 PROCEDURE — 96374 THER/PROPH/DIAG INJ IV PUSH: CPT

## 2025-08-04 PROCEDURE — 93010 ELECTROCARDIOGRAM REPORT: CPT

## 2025-08-04 PROCEDURE — 71046 X-RAY EXAM CHEST 2 VIEWS: CPT | Performed by: EMERGENCY MEDICINE

## 2025-08-04 PROCEDURE — 99285 EMERGENCY DEPT VISIT HI MDM: CPT

## 2025-08-04 PROCEDURE — 80053 COMPREHEN METABOLIC PANEL: CPT | Performed by: EMERGENCY MEDICINE

## 2025-08-04 PROCEDURE — 99284 EMERGENCY DEPT VISIT MOD MDM: CPT

## 2025-08-04 RX ORDER — AMLODIPINE BESYLATE 5 MG/1
5 TABLET ORAL ONCE
Status: COMPLETED | OUTPATIENT
Start: 2025-08-04 | End: 2025-08-04

## 2025-08-04 RX ORDER — LABETALOL HYDROCHLORIDE 5 MG/ML
20 INJECTION, SOLUTION INTRAVENOUS ONCE
Status: COMPLETED | OUTPATIENT
Start: 2025-08-04 | End: 2025-08-04

## 2025-08-04 RX ORDER — PREDNISONE 20 MG/1
40 TABLET ORAL DAILY
Qty: 8 TABLET | Refills: 0 | Status: SHIPPED | OUTPATIENT
Start: 2025-08-04 | End: 2025-08-08

## 2025-08-04 RX ORDER — AMLODIPINE BESYLATE 5 MG/1
5 TABLET ORAL DAILY
Qty: 30 TABLET | Refills: 0 | Status: SHIPPED | OUTPATIENT
Start: 2025-08-04 | End: 2025-09-03

## (undated) DIAGNOSIS — E66.01 CLASS 3 SEVERE OBESITY DUE TO EXCESS CALORIES WITHOUT SERIOUS COMORBIDITY WITH BODY MASS INDEX (BMI) OF 50.0 TO 59.9 IN ADULT (HCC): ICD-10-CM

## (undated) DIAGNOSIS — I10 BENIGN ESSENTIAL HYPERTENSION: ICD-10-CM

## (undated) DIAGNOSIS — D50.0 IRON DEFICIENCY ANEMIA DUE TO CHRONIC BLOOD LOSS: Primary | ICD-10-CM

## (undated) NOTE — LETTER
09/02/21        [address] [place] [address] [id number]      Dear Leo Maradiaga,    [address] [facility] records indicate that you have outstanding lab work and or testing that was ordered for you and has not yet been completed.       CBC W Differenti

## (undated) NOTE — LETTER
01/10/20        [address] [place] [phone]      Dear [facility] records indicate that you have outstanding lab work and or testing that was ordered for you and has not yet been completed:  Orders Placed Th

## (undated) NOTE — LETTER
Date & Time: 10/31/2024, 11:45 PM  Patient: Tami Serrano  Encounter Provider(s):    Judith Greene DO       To Whom It May Concern:    Tami Serrano was seen and treated in our department on 10/31/2024. She should not return to work until 11/2/24 .    If you have any questions or concerns, please do not hesitate to call.        _____________________________  Physician/APC Signature

## (undated) NOTE — LETTER
Veronica 3, 2017    Patient: Génesis Shannon   Date of Visit: 6/3/2017       To Whom It May Concern:    Ashley Singh was seen and treated in our emergency department on 6/3/2017.   She may return to work on 6/5/2017    If you have any questions or conc

## (undated) NOTE — LETTER
02/06/20        Guille Gaxiola  [address] [place] [phone]      Dear Andrew Conley records indicate that you have outstanding lab work and or testing that was ordered for you and has not yet been completed:  Orders Placed Th

## (undated) NOTE — ED AVS SNAPSHOT
THE [facility] Emergency Department in [address] [facility]    Phone:  [phone]    Fax:  [phone]           Sherry Smith   MRN: [id number]    Department:  THE [facility] Emergency Department in [place]   Date of Visi IF THERE IS ANY CHANGE OR WORSENING OF YOUR CONDITION, CALL YOUR PRIMARY CARE PHYSICIAN AT ONCE OR RETURN IMMEDIATELY TO THE EMERGENCY DEPARTMENT.     If you have been prescribed any medication(s), please fill your prescription right away and begin taking t

## (undated) NOTE — ED AVS SNAPSHOT
[facility] in [address] [facility]    Phone:  [phone]    Fax:  [phone]           Jeni Prater   MRN: [id number]    Department:  [facility] in [place]   Date of Visi nuestro adminstrador de lynsey joseph [phone]    Expect to receive an electronic request (by e-mail or text) to complete a self-assessment the day after your visit. You may also receive a call from our patient liason soon after your visit.  Also, some p [place] [id number]  ([address]) [address] [facility] [phone] [address] ([address] [place] [address]) [phone] [address] [place] [address] [address] ( [employer] questions? Call [phone] for help. [employer] is NOT to be used for urgent needs. For medical emergencies, dial 911.

## (undated) NOTE — LETTER
Date & Time: 7/3/2021, 5:11 AM  Patient: Hilda Burks  Encounter Provider(s):    Kassandra Estes MD       To Whom It May Concern:    Dean Glynn was seen and treated in our department on 7/3/2021.  She should not return to work until [employer]

## (undated) NOTE — ED AVS SNAPSHOT
Violette Huizarr   MRN: [id number]    Department:  THE [facility] Emergency Department in [place]   Date of Visit:  10/15/2018           Disclosure     Insurance plans vary and the physician(s) referred by the ER may not be covered by your plan.  Please tell this physician (or your personal doctor if your instructions are to return to your personal doctor) about any new or lasting problems. The primary care or specialist physician will see patients referred from the [facility] Emergency Department.  Rosy Billings

## (undated) NOTE — ED AVS SNAPSHOT
Inocente Arango   MRN: [id number]    Department:  [facility] Emergency Department in [place]   Date of Visit:  11/11/2017           Disclosure     Insurance plans vary and the physician(s) referred by the ER may not be covered by your plan.  Please con If you have been prescribed any medication(s), please fill your prescription right away and begin taking the medication(s) as directed    If the emergency physician has read X-rays, these will be re-interpreted by a radiologist.  If there is a significant

## (undated) NOTE — Clinical Note
Rashaun Peck I saw Ms. Douglas Canales in the office today her BP was elevated--> repeat /120, hasn't taken BP medication in the past 2 days. I encouraged her to take medication and if still high to seek urgent care/ER. I also put in order to get sleep study (which I think will help with BP). Sincerely, GANESH Jain APRN, [facility] Obesity Medicine [address] [facility] Weight Management  [facility] 178, [address] [facility], [facility], [address]   [phone] [facility]. [facility]

## (undated) NOTE — ED AVS SNAPSHOT
THE [facility] Emergency Department in [address] [facility]    Phone:  [phone]    Fax:  [phone]           Kimberly Samuel   MRN: [id number]    Department:  THE [facility] Emergency Department in [place]   Date of Visi IF THERE IS ANY CHANGE OR WORSENING OF YOUR CONDITION, CALL YOUR PRIMARY CARE PHYSICIAN AT ONCE OR RETURN IMMEDIATELY TO THE EMERGENCY DEPARTMENT.     If you have been prescribed any medication(s), please fill your prescription right away and begin taking t

## (undated) NOTE — ED AVS SNAPSHOT
THE [facility] Emergency Department in [address] [facility]    Phone:  [phone]    Fax:  [phone]           Kay Daniel   MRN: [id number]    Department:  THE [facility] Emergency Department in [place]   Date of Visi [address] [employer] [place] [phone]  Pediatric [phone] Emergency Department   [phone]       To Check ER Wait Times:  TEXT 'ERwait' to 93526      Click [email or web address]      Or call [phone]    If you have any will be contacted. Please make sure we have your correct phone number before you leave. After you leave, you should follow the attached instructions. I have read and understand the instructions given to me by my caregivers.         [facility] You can access your MyChart to more actively manage your health care and view more details from this visit by going to [email or web address].   If you've recently had a stay at the Hospital you can access your discharge instructions in [address] by roberto carlos

## (undated) NOTE — ED AVS SNAPSHOT
Charo Leonardo   MRN: [id number]    Department:  THE [facility] Emergency Department in [place]   Date of Visit:  11/29/2019           Disclosure     Insurance plans vary and the physician(s) referred by the ER may not be covered by your plan.  Please tell this physician (or your personal doctor if your instructions are to return to your personal doctor) about any new or lasting problems. The primary care or specialist physician will see patients referred from the [facility] Emergency Department.  Faizan Ken

## (undated) NOTE — ED AVS SNAPSHOT
THE [facility] Emergency Department in [address] [facility]    Phone:  [phone]    Fax:  [phone]           Tawana Ugarte   MRN: [id number]    Department:  THE [facility] Emergency Department in [place]   Date of Visi IF THERE IS ANY CHANGE OR WORSENING OF YOUR CONDITION, CALL YOUR PRIMARY CARE PHYSICIAN AT ONCE OR RETURN IMMEDIATELY TO THE EMERGENCY DEPARTMENT.     If you have been prescribed any medication(s), please fill your prescription right away and begin taking t

## (undated) NOTE — LETTER
April 24, 2017    Patient: Otto Mooney   Date of Visit: 4/24/2017       To Whom It May Concern:    Jane Elliott was seen and treated in our emergency department on 4/24/2017. She should not return to work until 4/26/17.     If you have any ques

## (undated) NOTE — Clinical Note
Dr. Kohli, I saw Ms. Serrano in the office today, her repeat BP was 162/100, she admitted that she hasn't been taking blood pressure medication regularly.. she is going to start and watch BPs at home (FYI) Sincerely, GANESH Dumont APRN, [facility] Obesity Medicine Specialist  [facility] Weight Management  [address] [place] [address], [address], [place] [address]  [phone] [email or web address]

## (undated) NOTE — LETTER
12/10/20        Maggi Granger  [address] [place] [address] [address]      Dear [facility],    [address] [facility] records indicate that you have outstanding lab work and or testing that was ordered for you and has not yet been completed:      CBC W Dif

## (undated) NOTE — LETTER
19      To Whom It May Concern:        Lanie Lr ( 09/10/1985) is currently under my medical care and was seen in my office today.  She is being treated for a medical condition with a new medication that is causing her to have a side effect t

## (undated) NOTE — Clinical Note
Rashaun Jimenez,  I saw Ms. Norma Nguyễn in the office today, repeat BP was still elevated 158/110- admits that she didn't take her BP medication this morning again. I discussed to her about the importance of taking this medication everyday! Just MELL  Sincerely, GANESH Vsos, GANESH, [facility] Obesity Medicine [address] [facility] Weight Management  [facility] 178, 45 [facility], [place], [address]   [phone] [email or web address]

## (undated) NOTE — LETTER
January 24, 2017    Patient: Mansoor Gotti   Date of Visit: 1/23/2017       To Whom It May Concern:    Lencho Michel was seen and treated in our emergency department on 1/23/2017.  She may return to work 1/25/2017    If you have any questions or c

## (undated) NOTE — ED AVS SNAPSHOT
Reed Atkinson   MRN: [id number]    Department:  [facility] Emergency Department in [place]   Date of Visit:  10/7/2017           Disclosure     Insurance plans vary and the physician(s) referred by the ER may not be covered by your plan.  Please cont If you have been prescribed any medication(s), please fill your prescription right away and begin taking the medication(s) as directed    If the emergency physician has read X-rays, these will be re-interpreted by a radiologist.  If there is a significant

## (undated) NOTE — ED AVS SNAPSHOT
[facility] in [address] [facility]    Phone:  [phone]    Fax:  [phone]           Kimberly Samuel   MRN: [id number]    Department:  [facility] Emergency Department in [place]   Date of Visi nuestro adminstrador de lynsey joseph [phone]    Expect to receive an electronic request (by e-mail or text) to complete a self-assessment the day after your visit. You may also receive a call from our patient liason soon after your visit.  Also, some p [facility] ([address]) [address]  ([address]) [address] [facility]. ([address]) [address] [facility] [address] (8 PROCEDURE:  XR WRIST COMPLETE (MIN 3 VIEWS), LEFT (CPT=73110)     TECHNIQUE:  Three views were obtained. COMPARISON:  None.      INDICATIONS:  LEFT WRIST PAIN POP AFTER PICKING UP LAUNDRY BASKET     PATIENT STATED HISTORY: (As transcribed by [employer]

## (undated) NOTE — ED AVS SNAPSHOT
[facility] Emergency Department    [place]  [address] [place] [address]    Phone:  [phone]    Fax:  [phone]           Reed Seoosevelt   MRN: [id number]    Department:  [facility] Emergency Department   Date of Visit:  1/ IF THERE IS ANY CHANGE OR WORSENING OF YOUR CONDITION, CALL YOUR PRIMARY CARE PHYSICIAN AT ONCE OR RETURN IMMEDIATELY TO THE EMERGENCY DEPARTMENT.     If you have been prescribed any medication(s), please fill your prescription right away and begin taking t

## (undated) NOTE — ED AVS SNAPSHOT
Inocente Arango   MRN: [id number]    [facility] in [place]   Date of Visit:  9/12/2017           Disclosure     Insurance plans vary and the physician(s) referred by the ER may not be covered by your plan.  Please cont If you have been prescribed any medication(s), please fill your prescription right away and begin taking the medication(s) as directed    If the emergency physician has read X-rays, these will be re-interpreted by a radiologist.  If there is a significant

## (undated) NOTE — ED AVS SNAPSHOT
[facility] Emergency Department    [place]  [address] [place] [address]    Phone:  [phone]    Fax:  [phone]           Tammy Goldberg   MRN: [id number]    Department:  [facility] Emergency Department   Date of Visit:  1/ side effects. Medication List      START taking these medications     ondansetron 4 MG Tbdp   Quantity:  10 tablet   Commonly known as:  ZOFRAN-ODT   Take 1 tablet (4 mg total) by mouth every 4 (four) hours as needed for Nausea.             Whe to a primary care or a specialist physician for a follow-up visit, please tell this physician (or your personal doctor if your instructions are to return to your personal doctor) about any new or lasting problems.  The primary care or specialist physician w We are concerned for your overall well being:    - If you are a smoker or have smoked in the last 12 months, we encourage you to explore options for quitting.     - If you have concerns related to behavioral health issues or thoughts of harming yourself,

## (undated) NOTE — ED AVS SNAPSHOT
Sangeetha Downing   MRN: [id number]    Department:  [address] Emergency Department in [place]   Date of Visit:  7/1/2019           Disclosure     Insurance plans vary and the physician(s) referred by the ER may not be covered by your plan.  Please co tell this physician (or your personal doctor if your instructions are to return to your personal doctor) about any new or lasting problems. The primary care or specialist physician will see patients referred from the [facility] Emergency Department.  Bryce Fernandez

## (undated) NOTE — ED AVS SNAPSHOT
[facility] Department in [address] [facility]    Phone:  [phone]    Fax:  [phone]           Génesis Shannon   MRN: [id number]    Department:  THE [facility] Emergency Department in [place]   Date of Visi IF THERE IS ANY CHANGE OR WORSENING OF YOUR CONDITION, CALL YOUR PRIMARY CARE PHYSICIAN AT ONCE OR RETURN IMMEDIATELY TO THE EMERGENCY DEPARTMENT.     If you have been prescribed any medication(s), please fill your prescription right away and begin taking t

## (undated) NOTE — LETTER
January 5, 2017    Patient: Kay Sanchez   Date of Visit: 1/5/2017       To Whom It May Concern:    Melissa Ramires was seen and treated in our emergency department on 1/5/2017. She should not return to work until [facility] January 9, 2017.     If you

## (undated) NOTE — LETTER
August 4, 2017    Patient: Mort Foot   Date of Visit: 8/4/2017       To Whom It May Concern:    Maria Guadalupe Hernandez was seen and treated in our emergency department on 8/4/2017. She may return to work on 8/7/17 without restrictions.     If you have

## (undated) NOTE — ED AVS SNAPSHOT
Madie Hall   MRN: [id number]    Department:  THE [facility] Emergency Department in [place]   Date of Visit:  12/15/2018           Disclosure     Insurance plans vary and the physician(s) referred by the ER may not be covered by your plan.  Please tell this physician (or your personal doctor if your instructions are to return to your personal doctor) about any new or lasting problems. The primary care or specialist physician will see patients referred from the [facility] Emergency Department.  Shelton Lombard

## (undated) NOTE — LETTER
10/23/19        Priti Rodriguez  [address] [place] [phone]      Dear Rosa Young records indicate that you have outstanding lab work and or testing that was ordered for you and has not yet been completed:  Orders Placed Th

## (undated) NOTE — ED AVS SNAPSHOT
Sherry Smith   MRN: [id number]    Department:  [facility] Emergency Department in [place]   Date of Visit:  10/25/2017           Disclosure     Insurance plans vary and the physician(s) referred by the ER may not be covered by your plan.  Please con If you have been prescribed any medication(s), please fill your prescription right away and begin taking the medication(s) as directed    If the emergency physician has read X-rays, these will be re-interpreted by a radiologist.  If there is a significant